# Patient Record
Sex: FEMALE | Race: WHITE | Employment: OTHER | URBAN - METROPOLITAN AREA
[De-identification: names, ages, dates, MRNs, and addresses within clinical notes are randomized per-mention and may not be internally consistent; named-entity substitution may affect disease eponyms.]

---

## 2017-01-09 ENCOUNTER — HOSPITAL ENCOUNTER (OUTPATIENT)
Dept: MAMMOGRAPHY | Age: 72
Discharge: HOME | End: 2017-01-09
Attending: OBSTETRICS & GYNECOLOGY
Payer: MEDICARE

## 2017-01-09 DIAGNOSIS — M94.9 DISORDER OF BONE AND CARTILAGE, UNSPECIFIED: ICD-10-CM

## 2017-01-09 DIAGNOSIS — M89.9 DISORDER OF BONE AND CARTILAGE, UNSPECIFIED: ICD-10-CM

## 2017-01-09 PROCEDURE — 77080 DXA BONE DENSITY AXIAL: CPT

## 2017-01-24 PROBLEM — M81.0 OSTEOPOROSIS: Status: ACTIVE | Noted: 2017-01-24

## 2017-10-04 ENCOUNTER — HOSPITAL ENCOUNTER (OUTPATIENT)
Dept: LAB | Age: 72
Discharge: HOME | End: 2017-10-04

## 2017-10-04 PROCEDURE — 88305 TISSUE EXAM BY PATHOLOGIST: CPT | Performed by: INTERNAL MEDICINE

## 2017-10-04 PROCEDURE — 88312 SPECIAL STAINS GROUP 1: CPT | Performed by: INTERNAL MEDICINE

## 2018-02-06 ENCOUNTER — HOSPITAL ENCOUNTER (OUTPATIENT)
Dept: GENERAL RADIOLOGY | Age: 73
Discharge: HOME | End: 2018-02-06
Attending: FAMILY MEDICINE
Payer: MEDICARE

## 2018-02-06 DIAGNOSIS — S69.92XA INJURY OF LEFT HAND, INITIAL ENCOUNTER: ICD-10-CM

## 2018-02-06 PROCEDURE — 73130 X-RAY EXAM OF HAND: CPT

## 2018-03-12 ENCOUNTER — HOSPITAL ENCOUNTER (OUTPATIENT)
Dept: MAMMOGRAPHY | Age: 73
Discharge: HOME | End: 2018-03-12
Attending: OBSTETRICS & GYNECOLOGY
Payer: MEDICARE

## 2018-03-12 DIAGNOSIS — Z12.39 SCREENING FOR BREAST CANCER: ICD-10-CM

## 2018-03-12 PROCEDURE — 77063 BREAST TOMOSYNTHESIS BI: CPT

## 2018-03-22 PROBLEM — R92.2 DENSE BREAST TISSUE ON MAMMOGRAM: Status: ACTIVE | Noted: 2018-03-22

## 2018-05-24 PROBLEM — R41.3 MEMORY DIFFICULTIES: Status: ACTIVE | Noted: 2018-05-24

## 2018-06-11 ENCOUNTER — HOSPITAL ENCOUNTER (OUTPATIENT)
Dept: MRI IMAGING | Age: 73
Discharge: HOME | End: 2018-06-11
Attending: PSYCHIATRY & NEUROLOGY
Payer: MEDICARE

## 2018-06-11 DIAGNOSIS — R41.3 MEMORY DIFFICULTIES: ICD-10-CM

## 2018-06-11 PROCEDURE — 70551 MRI BRAIN STEM W/O DYE: CPT

## 2018-06-11 NOTE — PROGRESS NOTES
Gopi Mendez,     I have reviewed your MRI brain report and pictures, there were a few tiny spots, nonspecific and can be seen in normal aging, migraine, headache injury, high blood pressure, etc. No stroke, no tumor.      Dr Alicja Greenwood

## 2018-06-15 ENCOUNTER — APPOINTMENT (RX ONLY)
Dept: URBAN - METROPOLITAN AREA CLINIC 349 | Facility: CLINIC | Age: 73
Setting detail: DERMATOLOGY
End: 2018-06-15

## 2018-06-15 DIAGNOSIS — L82.0 INFLAMED SEBORRHEIC KERATOSIS: ICD-10-CM

## 2018-06-15 DIAGNOSIS — I78.8 OTHER DISEASES OF CAPILLARIES: ICD-10-CM

## 2018-06-15 DIAGNOSIS — L91.8 OTHER HYPERTROPHIC DISORDERS OF THE SKIN: ICD-10-CM

## 2018-06-15 DIAGNOSIS — L85.3 XEROSIS CUTIS: ICD-10-CM

## 2018-06-15 PROBLEM — J45.909 UNSPECIFIED ASTHMA, UNCOMPLICATED: Status: ACTIVE | Noted: 2018-06-15

## 2018-06-15 PROBLEM — E03.9 HYPOTHYROIDISM, UNSPECIFIED: Status: ACTIVE | Noted: 2018-06-15

## 2018-06-15 PROCEDURE — 99202 OFFICE O/P NEW SF 15 MIN: CPT | Mod: 25

## 2018-06-15 PROCEDURE — 17110 DESTRUCTION B9 LES UP TO 14: CPT

## 2018-06-15 PROCEDURE — ? COUNSELING

## 2018-06-15 PROCEDURE — ? SKIN TAG REMOVAL

## 2018-06-15 PROCEDURE — ? BENIGN DESTRUCTION

## 2018-06-15 PROCEDURE — ? RECOMMENDATIONS

## 2018-06-15 PROCEDURE — 11200 RMVL SKIN TAGS UP TO&INC 15: CPT | Mod: 59

## 2018-06-15 ASSESSMENT — LOCATION DETAILED DESCRIPTION DERM
LOCATION DETAILED: LEFT VENTRAL PROXIMAL FOREARM
LOCATION DETAILED: RIGHT INFERIOR LATERAL NECK
LOCATION DETAILED: RIGHT SUPERIOR MEDIAL UPPER BACK
LOCATION DETAILED: SUBXIPHOID
LOCATION DETAILED: LEFT ANTERIOR PROXIMAL THIGH
LOCATION DETAILED: LEFT MEDIAL MALAR CHEEK
LOCATION DETAILED: LEFT SUPERIOR LATERAL NECK
LOCATION DETAILED: RIGHT ANTERIOR DISTAL THIGH
LOCATION DETAILED: RIGHT SUPERIOR LATERAL NECK
LOCATION DETAILED: LEFT INFERIOR UPPER BACK
LOCATION DETAILED: RIGHT VENTRAL PROXIMAL FOREARM
LOCATION DETAILED: LEFT INFERIOR ANTERIOR NECK
LOCATION DETAILED: LEFT SUPERIOR ANTERIOR NECK
LOCATION DETAILED: LEFT SUPERIOR LATERAL MIDBACK
LOCATION DETAILED: LEFT INFERIOR LATERAL NECK
LOCATION DETAILED: LEFT RIB CAGE

## 2018-06-15 ASSESSMENT — LOCATION SIMPLE DESCRIPTION DERM
LOCATION SIMPLE: LEFT THIGH
LOCATION SIMPLE: RIGHT ANTERIOR NECK
LOCATION SIMPLE: LEFT UPPER BACK
LOCATION SIMPLE: LEFT ANTERIOR NECK
LOCATION SIMPLE: ABDOMEN
LOCATION SIMPLE: RIGHT FOREARM
LOCATION SIMPLE: LEFT FOREARM
LOCATION SIMPLE: RIGHT THIGH
LOCATION SIMPLE: LEFT CHEEK
LOCATION SIMPLE: LEFT LOWER BACK
LOCATION SIMPLE: RIGHT UPPER BACK

## 2018-06-15 ASSESSMENT — LOCATION ZONE DERM
LOCATION ZONE: FACE
LOCATION ZONE: ARM
LOCATION ZONE: TRUNK
LOCATION ZONE: LEG
LOCATION ZONE: NECK

## 2018-06-15 NOTE — PROCEDURE: SKIN TAG REMOVAL
Medical Necessity Information: It is in your best interest to select a reason for this procedure from the list below. All of these items fulfill various CMS LCD requirements except the new and changing color options.
Medical Necessity Clause: This procedure was medically necessary because the lesions that were treated were:
Include Z78.9 (Other Specified Conditions Influencing Health Status) As An Associated Diagnosis?: No
Anesthesia Volume In Cc: 0
Consent: Written consent obtained and the risks of skin tag removal was reviewed with the patient including but not limited to bleeding, pigmentary change, infection, pain, and remote possibility of scarring.
Detail Level: Detailed
Add Associated Diagnoses If Applicable When Selecting Medical Necessity: Yes

## 2018-06-15 NOTE — PROCEDURE: BENIGN DESTRUCTION
Medical Necessity Information: It is in your best interest to select a reason for this procedure from the list below. All of these items fulfill various CMS LCD requirements except the new and changing color options.
Add 52 Modifier (Optional): no
Treatment Number (Will Not Render If 0): 0
Medical Necessity Clause: This procedure was medically necessary because the lesions that were treated were:
Consent: The patient's consent was obtained including but not limited to risks of crusting, scabbing, blistering, scarring, darker or lighter pigmentary change, recurrence, incomplete removal and infection.
Detail Level: Detailed
Post-Care Instructions: I reviewed with the patient in detail post-care instructions. Patient is to wear sunprotection, and avoid picking at any of the treated lesions. Pt may apply Vaseline to crusted or scabbing areas.

## 2018-06-15 NOTE — PROCEDURE: RECOMMENDATIONS
Detail Level: Zone
Recommendations (Free Text): Dove for sensitive skin bar soap \\nCerave cream apply twice daily for dryness

## 2018-07-02 ENCOUNTER — APPOINTMENT (RX ONLY)
Dept: URBAN - METROPOLITAN AREA CLINIC 349 | Facility: CLINIC | Age: 73
Setting detail: DERMATOLOGY
End: 2018-07-02

## 2018-07-02 DIAGNOSIS — L85.3 XEROSIS CUTIS: ICD-10-CM

## 2018-07-02 DIAGNOSIS — L82.0 INFLAMED SEBORRHEIC KERATOSIS: ICD-10-CM

## 2018-07-02 DIAGNOSIS — D18.0 HEMANGIOMA: ICD-10-CM

## 2018-07-02 DIAGNOSIS — Z71.89 OTHER SPECIFIED COUNSELING: ICD-10-CM

## 2018-07-02 DIAGNOSIS — L82.1 OTHER SEBORRHEIC KERATOSIS: ICD-10-CM

## 2018-07-02 PROBLEM — D18.01 HEMANGIOMA OF SKIN AND SUBCUTANEOUS TISSUE: Status: ACTIVE | Noted: 2018-07-02

## 2018-07-02 PROCEDURE — ? COUNSELING

## 2018-07-02 PROCEDURE — 17110 DESTRUCTION B9 LES UP TO 14: CPT

## 2018-07-02 PROCEDURE — ? TREATMENT REGIMEN

## 2018-07-02 PROCEDURE — ? LIQUID NITROGEN

## 2018-07-02 PROCEDURE — 99213 OFFICE O/P EST LOW 20 MIN: CPT | Mod: 25

## 2018-07-02 ASSESSMENT — LOCATION SIMPLE DESCRIPTION DERM
LOCATION SIMPLE: LEFT UPPER BACK
LOCATION SIMPLE: LEFT PRETIBIAL REGION
LOCATION SIMPLE: RIGHT PRETIBIAL REGION
LOCATION SIMPLE: LEFT LOWER BACK
LOCATION SIMPLE: LEFT FOREARM
LOCATION SIMPLE: ABDOMEN
LOCATION SIMPLE: LEFT POSTERIOR THIGH
LOCATION SIMPLE: RIGHT UPPER BACK
LOCATION SIMPLE: RIGHT FOREARM

## 2018-07-02 ASSESSMENT — LOCATION DETAILED DESCRIPTION DERM
LOCATION DETAILED: LEFT DISTAL POSTERIOR THIGH
LOCATION DETAILED: LEFT PROXIMAL PRETIBIAL REGION
LOCATION DETAILED: LEFT LATERAL ABDOMEN
LOCATION DETAILED: LEFT PROXIMAL DORSAL FOREARM
LOCATION DETAILED: RIGHT MID-UPPER BACK
LOCATION DETAILED: RIGHT PROXIMAL PRETIBIAL REGION
LOCATION DETAILED: LEFT SUPERIOR LATERAL LOWER BACK
LOCATION DETAILED: EPIGASTRIC SKIN
LOCATION DETAILED: LEFT RIB CAGE
LOCATION DETAILED: LEFT MEDIAL UPPER BACK
LOCATION DETAILED: RIGHT PROXIMAL DORSAL FOREARM

## 2018-07-02 ASSESSMENT — LOCATION ZONE DERM
LOCATION ZONE: ARM
LOCATION ZONE: TRUNK
LOCATION ZONE: LEG

## 2018-07-02 NOTE — PROCEDURE: TREATMENT REGIMEN
Detail Level: Zone
Samples Given: Aveeno skin relief moisturizers, cerave skin relief moisturizers with coupons
Initiate Treatment: Advised Aveeno moisturizers to use several times daily

## 2018-07-02 NOTE — PROCEDURE: LIQUID NITROGEN
Medical Necessity Information: It is in your best interest to select a reason for this procedure from the list below. All of these items fulfill various CMS LCD requirements except the new and changing color options.
Post-Care Instructions: I reviewed with the patient in detail post-care instructions. Patient is to wear sunprotection, and avoid picking at any of the treated lesions. Pt may apply Vaseline to crusted or scabbing areas.
Medical Necessity Clause: This procedure was medically necessary because the lesions that were treated were:
Render Post-Care Instructions In Note?: no
Consent: The patient's consent was obtained including but not limited to risks of crusting, scabbing, blistering, scarring, darker or lighter pigmentary change, recurrence, incomplete removal and infection.
Detail Level: Detailed

## 2018-09-21 ENCOUNTER — HOSPITAL ENCOUNTER (OUTPATIENT)
Dept: ULTRASOUND IMAGING | Age: 73
Discharge: HOME | End: 2018-09-21
Payer: MEDICARE

## 2018-09-21 DIAGNOSIS — R53.83 FATIGUE, UNSPECIFIED TYPE: ICD-10-CM

## 2018-09-21 DIAGNOSIS — E04.2 MULTIPLE THYROID NODULES: ICD-10-CM

## 2018-09-21 PROCEDURE — 76536 US EXAM OF HEAD AND NECK: CPT

## 2018-10-16 ENCOUNTER — HOSPITAL ENCOUNTER (OUTPATIENT)
Dept: GENERAL RADIOLOGY | Age: 73
Discharge: HOME | End: 2018-10-16
Attending: FAMILY MEDICINE
Payer: MEDICARE

## 2018-10-16 DIAGNOSIS — R05.9 COUGH: ICD-10-CM

## 2018-10-16 PROCEDURE — 71046 X-RAY EXAM CHEST 2 VIEWS: CPT

## 2019-03-06 ENCOUNTER — HOSPITAL ENCOUNTER (OUTPATIENT)
Dept: PHYSICAL THERAPY | Age: 74
Discharge: HOME | End: 2019-03-06
Attending: FAMILY MEDICINE
Payer: MEDICARE

## 2019-03-06 DIAGNOSIS — R42 DIZZINESS: ICD-10-CM

## 2019-03-06 PROCEDURE — 97112 NEUROMUSCULAR REEDUCATION: CPT

## 2019-03-06 PROCEDURE — 97162 PT EVAL MOD COMPLEX 30 MIN: CPT

## 2019-03-06 NOTE — THERAPY EVALUATION
Shasha Slider  : 1945  [place]  Secondary: J[facility] at [place] [id number] [facility], [address] [place] [address],[address], [facility] [address]  [phone]   [phone]           OUTPATIENT PHYSICAL THERAPY:Initial Assessment 3/6/2019   ICD-10: Treatment Diagnosis: Other abnormalities of gait and mobility R26.89; Dizziness and giddiness R42  Precautions/Allergies:   Codeine; Morphine; Amoxicillin-pot clavulanate; Ciprofloxacin; Demerol [meperidine]; Gatifloxacin; Gluten protein; Sulfamethoxazole-trimethoprim; Telithromycin; Bactrim [sulfamethoprim ds]; Cephalosporins; Gluten; Omnicef [cefdinir]; Phenergan [promethazine]; and Synthroid [levothyroxine]   MD Orders: eval and treat MEDICAL/REFERRING DIAGNOSIS:  Dizziness [R42]   DATE OF ONSET: 7 weeks ago. REFERRING PHYSICIAN: Soniya Sanderson MD  RETURN PHYSICIAN APPOINTMENT:      INITIAL ASSESSMENT:  Ms. Khadijah Mario presents with dizziness and imbalance, improving over the last several weeks. Patient demonstrates imbalance with testing and reports that she is not functionally back to her normal daily activities at this point. Patient would benefit from PT to address these problems to improve patient's independence and safety with mobility and daily activities. Thank you. PROBLEM LIST (Impacting functional limitations):  1. Decreased Transfer Abilities  2. Decreased Balance  3. Decreased Activity Tolerance  4. Decreased [place] with Home Exercise Program INTERVENTIONS PLANNED: (Treatment may consist of any combination of the following)  1. Balance Exercise  2. Home Exercise Program (HEP)  3. Neuromuscular Re-education/Strengthening  4. Therapeutic Activites  5. Therapeutic Exercise/Strengthening   6. Vestibular and habituation training   TREATMENT PLAN:  Effective Dates: 3/6/2019 TO 2019 (90 days).   Frequency/Duration: 1-2 times a week for 60- 90 Day(s)  GOALS: (Goals have been discussed and agreed upon with patient.)  Short-Term Functional Goals: Time Frame: 2-4 weeks  1. Patient will demonstrate independence and compliance with home exercise program to improve balance and dizziness for daily activities. 2. Patient will increase her score on the dynamic gait index to greater than or equal to 19/24 indicating improved balance and safety for community ambulation. 3.   Discharge Goals: Time Frame: 8-12 weeks  1. Patient will report decreased dizziness to less than or equal to 1/10 with daily activities to improve safety and quality of life. 2. Patient will increase her score on the dynamic gait index to greater than or equal to 23/24 indicating improved balance and safety for community ambulation. 3. Patient will increase her score on the Alejandra Balance Scale to greater than or equal to 52/56 indicating improved safety and decreased fall risk for daily activities. 4. Patient will ambulate with least assistive device over level and unlevel surfaces without evidence of imbalance to improve safety for daily activities. 5. Patient will demonstrate improved score on the Dizziness Handicap Inventory to less than or equal to 15 indicating decreased perceived dizziness and imbalance with daily activities. Rehabilitation Potential For Stated Goals: Good  Regarding Britt Silvano Wahl's therapy, I certify that the treatment plan above will be carried out by a therapist or under their direction. Thank you for this referral,  Asha Armas PT     Referring Physician Signature: Royer Thomson MD              Date                    The information in this section was collected on 3/6/19 (except where otherwise noted). HISTORY:   History of Present Injury/Illness (Reason for Referral):  7 weeks ago, went to hospital with vertigo. Checked heart, okay but very dehydrated. Just finished Home health PT for 5 weeks for dizziness and balance. Moving around stirs up dizziness.  Being still is fine. No falls. Ears feel plugged but no recent change in hearing. No changes in vision. No tinnitus. Tend to drift to the R when walking or moving too quick. Gets swimmy headed if move head too quick but goes away. Sleeping well. Not moving at pace like usual. Usually watches two grandchildren, driving, cleaning house. Wants to get back to that. Past Medical History/Comorbidities:   Ms. 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 Ms. Anabel Blackwood  has a past surgical history that includes hx cholecystectomy (1977); hx orthopaedic (Right); pr lap,tubal cautery; hx open cholecystectomy (1977); hx tubal ligation (Bilateral, 1981); hx gyn (2004); hx cataract removal (Bilateral, 2014); hx tonsillectomy (1950); hx dilation and curettage (1994; 2003); hx wisdom teeth extraction (1964); hx open reduction internal fixation (Right, 1995); hx cataract removal (06/30/2014); and hx colonoscopy (2014). Social History/Living Environment:     lives with daughter, son in law and 2 small children; retired teacher  Prior Level of Function/Work/Activity:  Independent, driving, taking care of grandchildren  Dominant Side:         RIGHT  Previous Treatment Approaches:          HHPT  Personal Factors:          Sex:  female        Age:  76 y.o.      Ambulatory/Rehab Services H2 Model Falls Risk Assessment    Risk Factors:       (1)  Dizziness/Vertigo Ability to Rise from Chair:       (0)  Ability to rise in a single movement    Falls Prevention Plan:       No modifications necessary   Total: (5 or greater = High Risk): 1    ©2010 [facility]. All Rights Reserved. [facility] States Patent #[id number]. Federal Law prohibits the replication, distribution or use without written permission from [facility]     Current Medications:       Current Outpatient Medications:     levothyroxine (SYNTHROID) 88 mcg tablet, Take 1 Tab by mouth Daily (before breakfast). , Disp: 1 Tab, Rfl: 0    BABY ASPIRIN PO, Take  by mouth daily. , Disp: , Rfl:     predniSONE (STERAPRED DS) 10 mg dose pack, TAD over 6 days, Disp: 1 Package, Rfl: 0    atorvastatin (LIPITOR) 10 mg tablet, TAKE 1 TABLET BY MOUTH DAILY, Disp: 90 Tab, Rfl: 0    nitroglycerin (NITROSTAT) 0.4 mg SL tablet, PLACE 1 TABLET UNDER THE TONGUE EVERY FIVE MINUTES AS NEEDED FOR CHEST PAIN, Disp: , Rfl:     TURMERIC ROOT EXTRACT PO, Take  by mouth., Disp: , Rfl:     cholecalciferol (VITAMIN D3) 1,000 unit cap, Take  by mouth daily. , Disp: , Rfl:     ketotifen (ZADITOR) 0.025 % (0.035 %) ophthalmic solution, Administer 1 Drop to both eyes two (2) times a day., Disp: , Rfl:     fluticasone-vilanterol (BREO ELLIPTA) 100-25 mcg/dose inhaler, Take 1 Puff by inhalation daily. , Disp: , Rfl:     levalbuterol (XOPENEX) 1.25 mg/0.5 mL nebu, 1.25 mg., Disp: , Rfl:     lisinopril (PRINIVIL, ZESTRIL) 20 mg tablet, Take 1 Tab by mouth two (2) times a day. Indications: hypertension (Patient taking differently: Take 20 mg by mouth daily. Indications: hypertension), Disp: 180 Tab, Rfl: 3    famotidine (PEPCID) 20 mg tablet, Take  by mouth two (2) times a day. 20 mg qam, 10 mg qpm. Per GI, Disp: , Rfl: 1    dicyclomine (BENTYL) 10 mg capsule, Take 1 Cap by mouth three (3) times daily as needed. Per GI, Disp: , Rfl: 5    B INFANTIS/B ANI/B IVONNE/B BIFID (PROBIOTIC 4X PO), Take  by mouth daily. , Disp: , Rfl:     Cetirizine (ZYRTEC) 10 mg cap, Take  by mouth., Disp: , Rfl:    azelastine (ASTELIN) 137 mcg nasal spray, 1 spray two (2) times a day. Use in each nostril as directed, Disp: , Rfl:     montelukast (SINGULAIR) 10 mg tablet, Take 10 mg by mouth daily. , Disp: , Rfl:     albuterol (PROAIR HFA) 90 mcg/actuation inhaler, Take  by inhalation. , Disp: , Rfl:    Date Last Reviewed:  3/6/19   Number of Personal Factors/Comorbidities that affect the Plan of Care: 1-2: MODERATE COMPLEXITY   EXAMINATION:   Observation/Orthostatic Postural Assessment:          Mild forward head posture  Strength:          4+/5 B LE  Functional Mobility:         Gait/Ambulation:  Patient ambulates with no AD with a normal gait pattern but demonstrates path deviations, particularly to the R. Transfers:  Independent, no UE assist for sit to stand        Bed Mobility:  independent        Stairs:  With rail  Sensation:         intact  Postural Control & Balance:  · Alejandra Balance Scale:  47/56.   (A score less than 45/56 indicates high risk of falls)     · Dynamic Gait Index:  17/24. (A score less than or equal to19/24 is abnormal and predictive of falls)     · The [employer] Test: NT      Oculomotor Exam:  · Eye Range of Motion:  full range of motion  · Cervical Range of Motion:   diminished range of motion  · Spontaneous nystagmus:  NO  · Gaze holding nystagmus:  NO  · Skew Deviation:  none  · Smooth Pursuit:      [x]Smooth Eye Movements    []Delayed Eye Movements     []Within Normal Limits     [x]Other(comment): no dizziness  · Voluntary Saccades:      [x]Smooth Eye Movements    []Delayed Eye Movements     []Within Normal Limits     [x]Other(comment): no dizziness  · Optokinetic cloth: NT  Vestibular Ocular Reflex Testing:  · Dynamic Visual Acuity Test:  NT  · Head Thrust Test: Negative to the right. Negative to the left. · VOR Cancellation: WNL  Position Tests:  · [place] testing: N/A       Body Structures Involved:  1. Nerves  2. Eyes and Ears  3.  Muscles Body Functions Affected:  1. Sensory/Pain  2. Neuromusculoskeletal  3. Movement Related Activities and Participation Affected:  1. General Tasks and Demands  2. Mobility  3. Domestic Life  4. Community, Social and [place]   Number of elements (examined above) that affect the Plan of Care: 3: MODERATE COMPLEXITY   CLINICAL PRESENTATION:   Presentation: Evolving clinical presentation with changing clinical characteristics: MODERATE COMPLEXITY   CLINICAL DECISION MAKING:   Outcome Measure: Tool Used: Alejandra Balance Scale  Score:  Initial: 47/56 Most Recent: X/56 (Date: -- )   Interpretation of Score: Each section is scored on a 0-4 scale, 0 representing the patients inability to perform the task and 4 representing independence. The scores of each section are added together for a total score of 56. The higher the patients score, the more independent the patient is. Any score below 45 indicates increased risk for falls. Tool Used: Dynamic Gait Index  Score:  Initial: 17/24 Most Recent: X/24 (Date: -- )   Interpretation of Score: Each section is scored on a 0-3 scale, 0 representing the patients inability to perform the task and 3 representing independence. The scores of each section are added together for a total score of 24. Any score below 19 indicates increased risk for falls. Tool Used: Dizziness Handicap Index  Score:  Initial: 46  Most Recent: X (Date: -- )   Interpretation of Score: To each item, the following scores may be assigned: No = 0, Sometimes = 2, Yes = 4. Scores greater than 10 points should be referred to balance specialists for further evaluation. Medical Necessity:   · Patient is expected to demonstrate progress in strength, balance and functional technique to improve safety during daily activities. Reason for Services/Other Comments:  · Patient continues to demonstrate capacity to improve balance, dizziness which will increase independence and increase safety.    Use of outcome tool(s) and clinical judgement create a POC that gives a: Questionable prediction of patient's progress: MODERATE COMPLEXITY            TREATMENT:   (In addition to Assessment/Re-Assessment sessions the following treatments were rendered)  Pre-treatment Symptoms/Complaints:  Dizziness with movement. Pain: Initial:   Pain Intensity 1: 0 0/10 pain;   3/10 dizziness today Post Session:  3/10 dizziness     NEUROMUSCULAR RE-EDUCATION: (20 minutes):  Exercise/activities per grid below to improve balance, kinesthetic sense, posture and proprioception. Required minimal verbal cues to promote static and dynamic balance in standing. Date:  3/6/19 Date:   Date:     Activity/Exercise Parameters Parameters Parameters   Walking with head turns 4 laps in hallway     Walking with head up/down 4 laps in hallway     marching 4 laps in hallway     sidestepping 4 laps in hallway     Standing 360 degree turns 1 rep x 3 each direction, more dizzy to R     Standing x1 viewing 30 sec x 2 each direction               [facility]  Treatment/Session Assessment:    · Response to Treatment:  Patient tolerated session well. Patient was given HEP to start working at home. Patient should progress well with balance and vestibular training. .  · Compliance with Program/Exercises: Will assess as treatment progresses. · Recommendations/Intent for next treatment session: \"Next visit will focus on advancements to more challenging activities\".   Total Treatment Duration:  PT Patient Time In/Time Out  Time In: 0930  Time Out: [address], PT    Future Appointments   Date Time Provider Enzo Pulido   3/8/2019  8:45 AM Allison Fountain, PT [facility]   3/13/2019  9:45 AM Allisonjoaquin Fountain PT [facility]   3/15/2019  8:45 AM Allison Sides, PT [facility]   3/18/2019 11:15 AM Tomas Arthur MD [facility]   3/19/2019  9:00 AM [facility]   3/21/2019  9:45 AM Allison Fountain, PT [facility]   3/26/2019  9:45 AM Dylan Staton, PT [facility]   3/28/2019  9:45 AM Dylan Staton, PT [facility]   4/2/2019 11:15 AM Yakelin Lilibeth Eller MD [facility]   4/20/2019  9:00 AM [facility] ROOM 3 [facility]   8/8/2019  1:00 PM Brian Waters MD [facility]

## 2019-03-13 ENCOUNTER — HOSPITAL ENCOUNTER (OUTPATIENT)
Dept: PHYSICAL THERAPY | Age: 74
Discharge: HOME | End: 2019-03-13
Attending: FAMILY MEDICINE
Payer: MEDICARE

## 2019-03-13 PROCEDURE — 97112 NEUROMUSCULAR REEDUCATION: CPT

## 2019-03-13 NOTE — PROGRESS NOTES
Nishant Gómez  : 1945  [place]  Secondary: Jt Huerta 75 at [facility], [address] [address],[address], [address].  [phone]   [phone]           OUTPATIENT PHYSICAL THERAPY:Daily Note 3/13/2019   ICD-10: Treatment Diagnosis: Other abnormalities of gait and mobility R26.89; Dizziness and giddiness R42  Precautions/Allergies:   Codeine; Morphine; Amoxicillin-pot clavulanate; Ciprofloxacin; Demerol [meperidine]; Gatifloxacin; Gluten protein; Sulfamethoxazole-trimethoprim; Telithromycin; Bactrim [sulfamethoprim ds]; Cephalosporins; Gluten; Omnicef [cefdinir]; Phenergan [promethazine]; and Synthroid [levothyroxine]   MD Orders: eval and treat MEDICAL/REFERRING DIAGNOSIS:  Dizziness [R42]   DATE OF ONSET: 7 weeks ago. REFERRING PHYSICIAN: Desiree Cage MD  RETURN PHYSICIAN APPOINTMENT:      INITIAL ASSESSMENT:  Ms. Cheryl Gonzalez presents with dizziness and imbalance, improving over the last several weeks. Patient demonstrates imbalance with testing and reports that she is not functionally back to her normal daily activities at this point. Patient would benefit from PT to address these problems to improve patient's independence and safety with mobility and daily activities. Thank you. PROBLEM LIST (Impacting functional limitations):  1. Decreased Transfer Abilities  2. Decreased Balance  3. Decreased Activity Tolerance  4. Decreased Liberty with Home Exercise Program INTERVENTIONS PLANNED: (Treatment may consist of any combination of the following)  1. Balance Exercise  2. Home Exercise Program (HEP)  3. Neuromuscular Re-education/Strengthening  4. Therapeutic Activites  5. Therapeutic Exercise/Strengthening   6. Vestibular and habituation training   TREATMENT PLAN:  Effective Dates: 3/6/2019 TO 2019 (90 days).   Frequency/Duration: 1-2 times a week for 60- 90 Day(s)  GOALS: (Goals have been discussed and agreed upon with patient.)  Short-Term Functional Goals: Time Frame: 2-4 weeks  1. Patient will demonstrate independence and compliance with home exercise program to improve balance and dizziness for daily activities. 2. Patient will increase her score on the dynamic gait index to greater than or equal to 19/24 indicating improved balance and safety for community ambulation. 3.   Discharge Goals: Time Frame: 8-12 weeks  1. Patient will report decreased dizziness to less than or equal to 1/10 with daily activities to improve safety and quality of life. 2. Patient will increase her score on the dynamic gait index to greater than or equal to 23/24 indicating improved balance and safety for community ambulation. 3. Patient will increase her score on the Alejandra Balance Scale to greater than or equal to 52/56 indicating improved safety and decreased fall risk for daily activities. 4. Patient will ambulate with least assistive device over level and unlevel surfaces without evidence of imbalance to improve safety for daily activities. 5. Patient will demonstrate improved score on the Dizziness Handicap Inventory to less than or equal to 15 indicating decreased perceived dizziness and imbalance with daily activities. Rehabilitation Potential For Stated Goals: Good              The information in this section was collected on 3/6/19 (except where otherwise noted). HISTORY:   History of Present Injury/Illness (Reason for Referral):  7 weeks ago, went to hospital with vertigo. Checked heart, okay but very dehydrated. Just finished Home health PT for 5 weeks for dizziness and balance. Moving around stirs up dizziness. Being still is fine. No falls. Ears feel plugged but no recent change in hearing. No changes in vision. No tinnitus. Tend to drift to the R when walking or moving too quick. Gets swimmy headed if move head too quick but goes away. Sleeping well.  Not moving at pace like usual. Usually watches two grandchildren, driving, cleaning house. Wants to get back to that. Past Medical History/Comorbidities:   Ms. 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 Ms. Anabel Blackwood  has a past surgical history that includes hx cholecystectomy (1977); hx orthopaedic (Right); pr lap,tubal cautery; hx open cholecystectomy (1977); hx tubal ligation (Bilateral, 1981); hx gyn (2004); hx cataract removal (Bilateral, 2014); hx tonsillectomy (1950); hx dilation and curettage (1994; 2003); hx wisdom teeth extraction (1964); hx open reduction internal fixation (Right, 1995); hx cataract removal (06/30/2014); and hx colonoscopy (2014). Social History/Living Environment:     lives with daughter, son in law and 2 small children; retired teacher  Prior Level of Function/Work/Activity:  Independent, driving, taking care of grandchildren  Dominant Side:         RIGHT  Previous Treatment Approaches:          HHPT  Personal Factors:          Sex:  female        Age:  76 y.o. Ambulatory/Rehab Services H2 Model Falls Risk Assessment    Risk Factors:       (1)  Dizziness/Vertigo Ability to Rise from Chair:       (0)  Ability to rise in a single movement    Falls Prevention Plan:       No modifications necessary   Total: (5 or greater = High Risk): 1    ©2010 [facility]. All Rights Reserved. [facility] Patent #[id number].  Federal Law prohibits the replication, distribution or use without written permission from [facility]     Current Medications:       Current Outpatient Medications:     levothyroxine (SYNTHROID) 88 mcg tablet, Take 1 Tab by mouth Daily (before breakfast). , Disp: 1 Tab, Rfl: 0    BABY ASPIRIN PO, Take  by mouth daily. , Disp: , Rfl:     predniSONE (STERAPRED DS) 10 mg dose pack, TAD over 6 days, Disp: 1 Package, Rfl: 0    atorvastatin (LIPITOR) 10 mg tablet, TAKE 1 TABLET BY MOUTH DAILY, Disp: 90 Tab, Rfl: 0    nitroglycerin (NITROSTAT) 0.4 mg SL tablet, PLACE 1 TABLET UNDER THE TONGUE EVERY FIVE MINUTES AS NEEDED FOR CHEST PAIN, Disp: , Rfl:     TURMERIC ROOT EXTRACT PO, Take  by mouth., Disp: , Rfl:     cholecalciferol (VITAMIN D3) 1,000 unit cap, Take  by mouth daily. , Disp: , Rfl:     ketotifen (ZADITOR) 0.025 % (0.035 %) ophthalmic solution, Administer 1 Drop to both eyes two (2) times a day., Disp: , Rfl:     fluticasone-vilanterol (BREO ELLIPTA) 100-25 mcg/dose inhaler, Take 1 Puff by inhalation daily. , Disp: , Rfl:     levalbuterol (XOPENEX) 1.25 mg/0.5 mL nebu, 1.25 mg., Disp: , Rfl:     lisinopril (PRINIVIL, ZESTRIL) 20 mg tablet, Take 1 Tab by mouth two (2) times a day. Indications: hypertension (Patient taking differently: Take 20 mg by mouth daily. Indications: hypertension), Disp: 180 Tab, Rfl: 3    famotidine (PEPCID) 20 mg tablet, Take  by mouth two (2) times a day. 20 mg qam, 10 mg qpm. Per GI, Disp: , Rfl: 1    dicyclomine (BENTYL) 10 mg capsule, Take 1 Cap by mouth three (3) times daily as needed. Per GI, Disp: , Rfl: 5    B INFANTIS/B ANI/B IVONNE/B BIFID (PROBIOTIC 4X PO), Take  by mouth daily. , Disp: , Rfl:     Cetirizine (ZYRTEC) 10 mg cap, Take  by mouth., Disp: , Rfl:     azelastine (ASTELIN) 137 mcg nasal spray, 1 spray two (2) times a day. Use in each nostril as directed, Disp: , Rfl:     montelukast (SINGULAIR) 10 mg tablet, Take 10 mg by mouth daily. , Disp: , Rfl:     albuterol (PROAIR HFA) 90 mcg/actuation inhaler, Take  by inhalation. , Disp: , Rfl:    Date Last Reviewed:  3/13/19   Number of Personal Factors/Comorbidities that affect the Plan of Care: 1-2: MODERATE COMPLEXITY   EXAMINATION:   Observation/Orthostatic Postural Assessment:          Mild forward head posture  Strength:          4+/5 B LE  Functional Mobility:         Gait/Ambulation:  Patient ambulates with no AD with a normal gait pattern but demonstrates path deviations, particularly to the R. Transfers:  Independent, no UE assist for sit to stand        Bed Mobility:  independent        Stairs:  With rail  Sensation:         intact  Postural Control & Balance:  · Alejandra Balance Scale:  47/56.   (A score less than 45/56 indicates high risk of falls)     · Dynamic Gait Index:  17/24. (A score less than or equal to19/24 is abnormal and predictive of falls)     · [employer] Sensory Organization Test: NT      Oculomotor Exam:  · Eye Range of Motion:  full range of motion  · Cervical Range of Motion:   diminished range of motion  · Spontaneous nystagmus:  NO  · Gaze holding nystagmus:  NO  · Skew Deviation:  none  · Smooth Pursuit:      [x]Smooth Eye Movements    []Delayed Eye Movements     []Within Normal Limits     [x]Other(comment): no dizziness  · Voluntary Saccades:      [x]Smooth Eye Movements    []Delayed Eye Movements     []Within Normal Limits     [x]Other(comment): no dizziness  · Optokinetic cloth: NT  Vestibular Ocular Reflex Testing:  · Dynamic Visual Acuity Test:  NT  · Head Thrust Test: Negative to the right. Negative to the left. · VOR Cancellation: WNL  Position Tests:  · Hallpike-Sonia testing: N/A       Body Structures Involved:  1. Nerves  2. Eyes and Ears  3. Muscles Body Functions Affected:  1. Sensory/Pain  2. Neuromusculoskeletal  3. Movement Related Activities and Participation Affected:  1. General Tasks and Demands  2. Mobility  3. Domestic Life  4.  Community, Social and [place]   Number of elements (examined above) that affect the Plan of Care: 3: MODERATE COMPLEXITY   CLINICAL PRESENTATION:   Presentation: Evolving clinical presentation with changing clinical characteristics: MODERATE COMPLEXITY   CLINICAL DECISION MAKING:   Outcome Measure: Tool Used: Alejandra Balance Scale  Score:  Initial: 47/56 Most Recent: X/56 (Date: -- )   Interpretation of Score: Each section is scored on a 0-4 scale, 0 representing the patients inability to perform the task and 4 representing independence. The scores of each section are added together for a total score of 56. The higher the patients score, the more independent the patient is. Any score below 45 indicates increased risk for falls. Tool Used: Dynamic Gait Index  Score:  Initial: 17/24 Most Recent: X/24 (Date: -- )   Interpretation of Score: Each section is scored on a 0-3 scale, 0 representing the patients inability to perform the task and 3 representing independence. The scores of each section are added together for a total score of 24. Any score below 19 indicates increased risk for falls. Tool Used: Dizziness Handicap Index  Score:  Initial: 46  Most Recent: X (Date: -- )   Interpretation of Score: To each item, the following scores may be assigned: No = 0, Sometimes = 2, Yes = 4. Scores greater than 10 points should be referred to balance specialists for further evaluation. Medical Necessity:   · Patient is expected to demonstrate progress in strength, balance and functional technique to improve safety during daily activities. Reason for Services/Other Comments:  · Patient continues to demonstrate capacity to improve balance, dizziness which will increase independence and increase safety.    Use of outcome tool(s) and clinical judgement create a POC that gives a: Questionable prediction of patient's progress: MODERATE COMPLEXITY            TREATMENT:   (In addition to Assessment/Re-Assessment sessions the following treatments were rendered)  Pre-treatment Symptoms/Complaints:  Dizziness with movement. Exercises stir up the dizziness. Getting over an intestinal bug.   Pain: Initial:   Pain Intensity 1: 0 0/10 pain;   3/10 dizziness today Post Session:  1-2/10 dizziness     NEUROMUSCULAR RE-EDUCATION: (45 minutes):  Exercise/activities per grid below to improve balance, kinesthetic sense, posture and proprioception. Required minimal verbal cues to promote static and dynamic balance in standing. Balance/Vestibular Treatment:   Activity   Date  3/13/19 Date Date Date Date Date   Activity/Exercise   Sets/reps/equipment Sets/reps/  equipment Sets/reps/  equipment Sets/reps/  equipment Sets/reps/  equipment Sets/reps/  equipment   Walking with head turns     4 laps in hallway        Walking with head up & down     4 laps in hallway        Step overs             Step taps     6 inch step x 30 reps fwd and cross        Marching   4 laps in hallway        Sidestepping   4 laps in hallway        Crossovers           Earle           Walking  backwards             Tandem walking           Weaving in/out of cones             Picking up cones             Standing 360 degree turns           [employer] ball           Figure 8s    2 x 3 reps        Circles right/left   2 x 3 reps each direction; more dizziness to R, but decreased with repetition        Walking with 360 degree turns           Spirals           Weight shifting:    Left & Right             Weight shifting:   Forward & Backward              Static Standing Balance             Standing with feet apart     Blue foams eyes open with head turns and up/down and eyes  closed        Standing with feet together             Standing with feet semitandem   Blue foams eyes open with head turns and up/down and eyes  Closed in each position        Standing with feet tandem           Single leg stance           X1/X2 Viewing exercises     standing x1 viewing 30 sec x 2 each directionwith checkerboard        Hallpike-[place] testing for BPPV (Benign Paroxysmal Positional Vertigo)             Rainey-Pasquale exercises           Canalith Repositioning treatment/Epley Maneuver  for BPPV (Benign Paroxysmal Positional Vertigo)           [employer] Training: See scanned report. [employer] Portal  Treatment/Session Assessment:    · Response to Treatment:  Patient tolerated session well. She reported less dizziness after exercises and demonstrated good balance. Patient tends to have more dizziness with turns to the R versus L. Continue to progress as tolerated. · Compliance with Program/Exercises: Compliant most of the time. · Recommendations/Intent for next treatment session: \"Next visit will focus on advancements to more challenging activities\".   Total Treatment Duration:  PT Patient Time In/Time Out  Time In: 9484  Time Out: [address] [facility], PT    Future Appointments   Date Time Provider Enzo Pulido   3/14/2019  9:45 AM Zeinab Singletary PT [facility]   3/18/2019 11:15 AM MD GINA Landon [facility]   3/19/2019 11:15 AM Zeinab Singletary PT [facility]   3/21/2019  9:45 AM Zeinab Singletary PT [facility]   3/26/2019  9:45 AM Zeinab Singletary PT [facility]   3/28/2019  9:45 AM Zeinab Singletary, PT [facility]   4/2/2019 11:15 AM Ivanna Gibson MD [facility]   4/20/2019  9:00 AM SFE [facility] ROOM 3 [facility]   8/8/2019  1:00 PM Jyacob Enamorado MD [facility]

## 2019-03-14 ENCOUNTER — HOSPITAL ENCOUNTER (OUTPATIENT)
Dept: PHYSICAL THERAPY | Age: 74
Discharge: HOME | End: 2019-03-14
Attending: FAMILY MEDICINE
Payer: MEDICARE

## 2019-03-14 PROCEDURE — 97112 NEUROMUSCULAR REEDUCATION: CPT

## 2019-03-14 NOTE — PROGRESS NOTES
Celestino Baumgarten  : 1945  [place]  Secondary: Jt Huerta 75 at [address] [facility]  [address] [facility], [address] [place] [address],[address], [address].  [phone]   [phone]           OUTPATIENT PHYSICAL THERAPY:Daily Note 3/14/2019   ICD-10: Treatment Diagnosis: Other abnormalities of gait and mobility R26.89; Dizziness and giddiness R42  Precautions/Allergies:   Codeine; Morphine; Amoxicillin-pot clavulanate; Ciprofloxacin; Demerol [meperidine]; Gatifloxacin; Gluten protein; Sulfamethoxazole-trimethoprim; Telithromycin; Bactrim [sulfamethoprim ds]; Cephalosporins; Gluten; Omnicef [cefdinir]; Phenergan [promethazine]; and Synthroid [levothyroxine]   MD Orders: eval and treat MEDICAL/REFERRING DIAGNOSIS:  Dizziness [R42]   DATE OF ONSET: 7 weeks ago. REFERRING PHYSICIAN: Laurence Cummings MD  RETURN PHYSICIAN APPOINTMENT:      INITIAL ASSESSMENT:  Ms. Wesly Watts presents with dizziness and imbalance, improving over the last several weeks. Patient demonstrates imbalance with testing and reports that she is not functionally back to her normal daily activities at this point. Patient would benefit from PT to address these problems to improve patient's independence and safety with mobility and daily activities. Thank you. PROBLEM LIST (Impacting functional limitations):  1. Decreased Transfer Abilities  2. Decreased Balance  3. Decreased Activity Tolerance  4. Decreased [place] with Home Exercise Program INTERVENTIONS PLANNED: (Treatment may consist of any combination of the following)  1. Balance Exercise  2. Home Exercise Program (HEP)  3. Neuromuscular Re-education/Strengthening  4. Therapeutic Activites  5. Therapeutic Exercise/Strengthening   6. Vestibular and habituation training   TREATMENT PLAN:  Effective Dates: 3/6/2019 TO 2019 (90 days).   Frequency/Duration: 1-2 times a week for 60- 90 Day(s)  GOALS: (Goals have been discussed and agreed upon with patient.)  Short-Term Functional Goals: Time Frame: 2-4 weeks  1. Patient will demonstrate independence and compliance with home exercise program to improve balance and dizziness for daily activities. MET  2. Patient will increase her score on the dynamic gait index to greater than or equal to 19/24 indicating improved balance and safety for community ambulation. 3.   Discharge Goals: Time Frame: 8-12 weeks  1. Patient will report decreased dizziness to less than or equal to 1/10 with daily activities to improve safety and quality of life. 2. Patient will increase her score on the dynamic gait index to greater than or equal to 23/24 indicating improved balance and safety for community ambulation. 3. Patient will increase her score on the Alejandra Balance Scale to greater than or equal to 52/56 indicating improved safety and decreased fall risk for daily activities. 4. Patient will ambulate with least assistive device over level and unlevel surfaces without evidence of imbalance to improve safety for daily activities. 5. Patient will demonstrate improved score on the Dizziness Handicap Inventory to less than or equal to 15 indicating decreased perceived dizziness and imbalance with daily activities. Rehabilitation Potential For Stated Goals: Good              The information in this section was collected on 3/6/19 (except where otherwise noted). HISTORY:   History of Present Injury/Illness (Reason for Referral):  7 weeks ago, went to hospital with vertigo. Checked heart, okay but very dehydrated. Just finished Home health PT for 5 weeks for dizziness and balance. Moving around stirs up dizziness. Being still is fine. No falls. Ears feel plugged but no recent change in hearing. No changes in vision. No tinnitus. Tend to drift to the R when walking or moving too quick. Gets swimmy headed if move head too quick but goes away. Sleeping well.  Not moving at pace like usual. Usually watches two grandchildren, driving, cleaning house. Wants to get back to that. Past Medical History/Comorbidities:   Ms. Jen Trevino  has a past medical history of Acid reflux, Arrhythmia (2013), Arthritis, Asthma, Bronchitis, CAD (coronary artery disease), Essential hypertension (6/13/2016), GERD (gastroesophageal reflux disease), Gluten intolerance, H/O echocardiogram (2013), H/O exercise stress test (2013), Headache, Hearing loss, Hiatal hernia, Hypercholesterolemia, Hypothyroidism, IBS (irritable bowel syndrome), Memory difficulties (5/24/2018), Menopause, Osteopenia (10/2/2015), Pap smear for cervical cancer screening (2012), Plantar fasciitis, and Supraventricular premature beats (6/23/2015). Ms. Jen Trevino  has a past surgical history that includes hx cholecystectomy (1977); hx orthopaedic (Right); pr lap,tubal cautery; hx open cholecystectomy (1977); hx tubal ligation (Bilateral, 1981); hx gyn (2004); hx cataract removal (Bilateral, 2014); hx tonsillectomy (1950); hx dilation and curettage (1994; 2003); hx wisdom teeth extraction (1964); hx open reduction internal fixation (Right, 1995); hx cataract removal (06/30/2014); and hx colonoscopy (2014). Social History/Living Environment:     lives with daughter, son in law and 2 small children; retired teacher  Prior Level of Function/Work/Activity:  Independent, driving, taking care of grandchildren  Dominant Side:         RIGHT  Previous Treatment Approaches:          HHPT  Personal Factors:          Sex:  female        Age:  76 y.o. Ambulatory/Rehab Services H2 Model Falls Risk Assessment    Risk Factors:       (1)  Dizziness/Vertigo Ability to Rise from Chair:       (0)  Ability to rise in a single movement    Falls Prevention Plan:       No modifications necessary   Total: (5 or greater = High Risk): 1    ©2010 [facility] [employer]. All Rights Reserved. [facility] Patent #[id number].  Federal Law prohibits the replication, distribution or use without written permission from [facility]     Current Medications:       Current Outpatient Medications:     levothyroxine (SYNTHROID) 88 mcg tablet, Take 1 Tab by mouth Daily (before breakfast). , Disp: 1 Tab, Rfl: 0    BABY ASPIRIN PO, Take  by mouth daily. , Disp: , Rfl:     predniSONE (STERAPRED DS) 10 mg dose pack, TAD over 6 days, Disp: 1 Package, Rfl: 0    atorvastatin (LIPITOR) 10 mg tablet, TAKE 1 TABLET BY MOUTH DAILY, Disp: 90 Tab, Rfl: 0    nitroglycerin (NITROSTAT) 0.4 mg SL tablet, PLACE 1 TABLET UNDER THE TONGUE EVERY FIVE MINUTES AS NEEDED FOR CHEST PAIN, Disp: , Rfl:     TURMERIC ROOT EXTRACT PO, Take  by mouth., Disp: , Rfl:     cholecalciferol (VITAMIN D3) 1,000 unit cap, Take  by mouth daily. , Disp: , Rfl:     ketotifen (ZADITOR) 0.025 % (0.035 %) ophthalmic solution, Administer 1 Drop to both eyes two (2) times a day., Disp: , Rfl:     fluticasone-vilanterol (BREO ELLIPTA) 100-25 mcg/dose inhaler, Take 1 Puff by inhalation daily. , Disp: , Rfl:     levalbuterol (XOPENEX) 1.25 mg/0.5 mL nebu, 1.25 mg., Disp: , Rfl:     lisinopril (PRINIVIL, ZESTRIL) 20 mg tablet, Take 1 Tab by mouth two (2) times a day. Indications: hypertension (Patient taking differently: Take 20 mg by mouth daily. Indications: hypertension), Disp: 180 Tab, Rfl: 3    famotidine (PEPCID) 20 mg tablet, Take  by mouth two (2) times a day. 20 mg qam, 10 mg qpm. Per GI, Disp: , Rfl: 1    dicyclomine (BENTYL) 10 mg capsule, Take 1 Cap by mouth three (3) times daily as needed. Per GI, Disp: , Rfl: 5    B INFANTIS/B ANI/B IVONNE/B BIFID (PROBIOTIC 4X PO), Take  by mouth daily. , Disp: , Rfl:     Cetirizine (ZYRTEC) 10 mg cap, Take  by mouth., Disp: , Rfl:     azelastine (ASTELIN) 137 mcg nasal spray, 1 spray two (2) times a day. Use in each nostril as directed, Disp: , Rfl:     montelukast (SINGULAIR) 10 mg tablet, Take 10 mg by mouth daily. , Disp: , Rfl:     albuterol (PROAIR HFA) 90 mcg/actuation inhaler, Take  by inhalation. , Disp: , Rfl:    Date Last Reviewed:  3/14/19   Number of Personal Factors/Comorbidities that affect the Plan of Care: 1-2: MODERATE COMPLEXITY   EXAMINATION:   Observation/Orthostatic Postural Assessment:          Mild forward head posture  Strength:          4+/5 B LE  Functional Mobility:         Gait/Ambulation:  Patient ambulates with no AD with a normal gait pattern but demonstrates path deviations, particularly to the R. Transfers:  Independent, no UE assist for sit to stand        Bed Mobility:  independent        Stairs:  With rail  Sensation:         intact  Postural Control & Balance:  · Alejandra Balance Scale:  47/56.   (A score less than 45/56 indicates high risk of falls)     · Dynamic Gait Index:  17/24. (A score less than or equal to19/24 is abnormal and predictive of falls)     · Pulsityt Sensory Organization Test: NT      Oculomotor Exam:  · Eye Range of Motion:  full range of motion  · Cervical Range of Motion:   diminished range of motion  · Spontaneous nystagmus:  NO  · Gaze holding nystagmus:  NO  · Skew Deviation:  none  · Smooth Pursuit:      [x]Smooth Eye Movements    []Delayed Eye Movements     []Within Normal Limits     [x]Other(comment): no dizziness  · Voluntary Saccades:      [x]Smooth Eye Movements    []Delayed Eye Movements     []Within Normal Limits     [x]Other(comment): no dizziness  · Optokinetic cloth: NT  Vestibular Ocular Reflex Testing:  · Dynamic Visual Acuity Test:  NT  · Head Thrust Test: Negative to the right. Negative to the left. · VOR Cancellation: WNL  Position Tests:  · Hallpike-Alston testing: N/A       Body Structures Involved:  1. Nerves  2. Eyes and Ears  3. Muscles Body Functions Affected:  1. Sensory/Pain  2. Neuromusculoskeletal  3. Movement Related Activities and Participation Affected:  1. General Tasks and Demands  2. Mobility  3. Domestic Life  4.  Community, Social and [place]   Number of elements (examined above) that affect the Plan of Care: 3: MODERATE COMPLEXITY   CLINICAL PRESENTATION:   Presentation: Evolving clinical presentation with changing clinical characteristics: MODERATE COMPLEXITY   CLINICAL DECISION MAKING:   Outcome Measure: Tool Used: Alejandra Balance Scale  Score:  Initial: 47/56 Most Recent: X/56 (Date: -- )   Interpretation of Score: Each section is scored on a 0-4 scale, 0 representing the patients inability to perform the task and 4 representing independence. The scores of each section are added together for a total score of 56. The higher the patients score, the more independent the patient is. Any score below 45 indicates increased risk for falls. Tool Used: Dynamic Gait Index  Score:  Initial: 17/24 Most Recent: X/24 (Date: -- )   Interpretation of Score: Each section is scored on a 0-3 scale, 0 representing the patients inability to perform the task and 3 representing independence. The scores of each section are added together for a total score of 24. Any score below 19 indicates increased risk for falls. Tool Used: Dizziness Handicap Index  Score:  Initial: 46  Most Recent: X (Date: -- )   Interpretation of Score: To each item, the following scores may be assigned: No = 0, Sometimes = 2, Yes = 4. Scores greater than 10 points should be referred to balance specialists for further evaluation. Medical Necessity:   · Patient is expected to demonstrate progress in strength, balance and functional technique to improve safety during daily activities. Reason for Services/Other Comments:  · Patient continues to demonstrate capacity to improve balance, dizziness which will increase independence and increase safety. Use of outcome tool(s) and clinical judgement create a POC that gives a: Questionable prediction of patient's progress: MODERATE COMPLEXITY            TREATMENT:   (In addition to Assessment/Re-Assessment sessions the following treatments were rendered)  Pre-treatment Symptoms/Complaints:  Doing okay. Still dizzy. Just tired after last session. Pain: Initial:   Pain Intensity 1: 0 0/10 pain;   3/10 dizziness today when I move, lenora when I move too fast. Post Session:  1-2/10 dizziness     NEUROMUSCULAR RE-EDUCATION: (40 minutes):  Exercise/activities per grid below to improve balance, kinesthetic sense, posture and proprioception. Required minimal verbal cues to promote static and dynamic balance in standing. Balance/Vestibular Treatment:   Activity   Date  3/13/19 Date  3/14/19 Date Date Date Date   Activity/Exercise   Sets/reps/equipment Sets/reps/  equipment Sets/reps/  equipment Sets/reps/  equipment Sets/reps/  equipment Sets/reps/  equipment   Walking with head turns     4 laps in hallway 6 laps in hallway       Walking with head up & down     4 laps in hallway 6 laps in hallway       Step overs             Step taps     6 inch step x 30 reps fwd and cross 6 inch step x 30 reps fwd and cross       Marching   4 laps in hallway 4 laps in hallway       Sidestepping   4 laps in hallway 4 laps in hallway       Crossovers           [place]           Walking  backwards             Tandem walking           Weaving in/out of cones             Picking up cones             Standing 360 degree turns           Ball toss           Standing ball circles    2 x 10 reps each direction       Figure 8s    2 x 3 reps 3 x 3 reps       Circles right/left   2 x 3 reps each direction; more dizziness to R, but decreased with repetition 3 x 3 reps each direction; more dizziness to R, but decreased with repetition       Walking with 360 degree turns           Spirals           Weight shifting:    Left & Right             Weight shifting:   Forward & Backward              Static Standing Balance             Standing with feet apart     Blue foams eyes open with head turns and up/down and eyes  closed Blue foams eyes open with head turns and up/down and eyes  closed       Standing with feet together             Standing with feet semitandem   Blue foams eyes open with head turns and up/down and eyes  Closed in each position Blue foams eyes open with head turns and up/down and eyes  Closed in each position       Standing with feet tandem           Single leg stance           X1/X2 Viewing exercises     standing x1 viewing 30 sec x 2 each directionwith checkerboard standing x1 viewing 30 sec x 2 each directionwith checkerboard       Hallpike-Lolita testing for BPPV (Benign Paroxysmal Positional Vertigo)             Rainey-Daroff exercises           Canalith Repositioning treatment/Epley Maneuver  for BPPV (Benign Paroxysmal Positional Vertigo)           Smart Equitest Training: See scanned report. [employer] Portal  Treatment/Session Assessment:    · Response to Treatment:  Patient tolerated session well. She reported a little dizziness with exercises, but it seemed to resolve more quickly today. Continue to progress as tolerated. · Compliance with Program/Exercises: Compliant most of the time. · Recommendations/Intent for next treatment session: \"Next visit will focus on advancements to more challenging activities\".   Total Treatment Duration:  PT Patient Time In/Time Out  Time In: 0945  Time Out: [place] [address], PT    Future Appointments   Date Time Provider Enzo Pulido   3/18/2019 11:15 AM Laurence Cummings MD [facility]   3/19/2019 11:15 AM Phoebe Montgomery, PT [facility]   3/21/2019  9:45 AM Phoebe Montgomery, PT [facility]   3/26/2019  9:45 AM Phoebe Montgomery, PT [facility]   3/28/2019  9:45 AM Phoebe Montgomery, PT [facility]   4/2/2019 11:15 AM Mariah Bustillo MD [facility]   4/20/2019  9:00 AM S[facility] ROOM 3 [facility] E   8/8/2019  1:00 PM Laurence Cummings MD [facility]

## 2019-03-19 ENCOUNTER — HOSPITAL ENCOUNTER (OUTPATIENT)
Dept: PHYSICAL THERAPY | Age: 74
Discharge: HOME | End: 2019-03-19
Attending: FAMILY MEDICINE
Payer: MEDICARE

## 2019-03-19 PROCEDURE — 97112 NEUROMUSCULAR REEDUCATION: CPT

## 2019-03-19 NOTE — PROGRESS NOTES
Kelle Claros  : 1945  [place]  Secondary: Jt Epperson at [place] [address] [facility], [address], [place] [address].  [phone]   [phone]         OUTPATIENT PHYSICAL THERAPY: Daily Treatment Note 3/19/2019  Pre-treatment Symptoms/Complaints:  Doing okay. A little dizzy. MD increased my thyroid dosage, it's been a day. Pain: Initial: Pain Intensity 1: 0 0, dizziness 2/10 Post Session:  0/10   Medications Last Reviewed:  3/19/19  Updated Objective Findings:  None Today   TREATMENT:       NEUROMUSCULAR RE-EDUCATION: (45 minutes):  Exercise/activities per grid below to improve balance, coordination, kinesthetic sense, posture and proprioception. Required minimal verbal cues to promote static and dynamic balance in standing.   Balance/Vestibular Treatment:   Activity    Date  3/13/19 Date  3/14/19 Date  3/19/19 Date Date Date   Activity/Exercise    Sets/reps/equipment Sets/reps/  equipment Sets/reps/  equipment Sets/reps/  equipment Sets/reps/  equipment Sets/reps/  equipment   Walking with head turns      4 laps in hallway 6 laps in hallway  6 laps in hallway         Walking with head up & down      4 laps in hallway 6 laps in hallway  6 laps in hallway         Walking with head diagonals             4 laps in hallway each direction         Step taps       6 inch step x 30 reps fwd and cross 6 inch step x 30 reps fwd and cross          Marching    4 laps in hallway 4 laps in hallway  4 laps in hallway         Sidestepping    4 laps in hallway 4 laps in hallway  4 laps in hallway         Crossovers                  [place]                  Walking  backwards                    Tandem walking                  Weaving in/out of cones                    Picking up cones                   Standing 360 degree turns                  Ball toss                  Standing ball circles      2 x 10 reps each direction  2 x 10 reps each direction         Figure 8s     2 x 3 reps 3 x 3 reps  3 x 3 reps         Circles right/left    2 x 3 reps each direction; more dizziness to R, but decreased with repetition 3 x 3 reps each direction; more dizziness to R, but decreased with repetition  3 x 3 reps each direction; more dizziness to R, but decreased with repetition         Walking with 360 degree turns                  Spirals                  Weight shifting:    Left & Right                     Weight shifting: Forward & Backward                      Static Standing Balance                     Standing with feet apart       Blue foams eyes open with head turns and up/down and eyes  closed Blue foams eyes open with head turns and up/down and eyes  closed  Blue foams and sanddune eyes open with head turns and up/down and eyes  closed         Standing with feet together                     Standing with feet semitandem    Blue foams eyes open with head turns and up/down and eyes  Closed in each position Blue foams eyes open with head turns and up/down and eyes  Closed in each position  Blue foams eyes open with head turns and up/down and eyes  Closed in each position         Standing with feet tandem                  Single leg stance                  X1/X2 Viewing exercises       standing x1 viewing 30 sec x 2 each directionwith checkerboard standing x1 viewing 30 sec x 2 each directionwith checkerboard  standing x1 viewing 30 sec x 2 each direction with checkerboard         Hallpike-Armour testing for BPPV (Benign Paroxysmal Positional Vertigo)                     Rainey-Daroff exercises                  Canalith Repositioning treatment/Epley Maneuver  for BPPV (Benign Paroxysmal Positional Vertigo)                  Smart Equitest Training: See scanned report.                             [employer] Portal  Treatment/Session Summary:    · Response to Treatment:  Patient tolerated session well.  She maintained balance well with smaill increases in dizziness which resolved with short rest breaks. .  · Communication/Consultation:  None today  · Equipment provided today:  None today  · Recommendations/Intent for next treatment session: Next visit will focus on continuing balance and habituation training. .  Treatment Plan of Care Effective Dates: 3/6/2019 TO 6/4/2019   Total Treatment Billable Duration:  45 minutes  PT Patient Time In/Time Out  Time In: 1162  Time Out: [place] 3, PT    Future Appointments   Date Time Provider Enzo Pulido   3/21/2019  9:45 AM Sanford Bridges, PT [facility]   3/26/2019  9:45 AM Sanford Bridges, PT [facility]   3/28/2019  9:45 AM Sanford Bridges, PT [facility]   4/2/2019 11:15 AM Fina Michaels MD [facility]   4/20/2019  9:00 AM [facility]   6/18/2019 10:00 AM Shekhar Marques MD [facility]   8/8/2019  1:00 PM Shekhar Marques MD [facility]

## 2019-03-21 ENCOUNTER — HOSPITAL ENCOUNTER (OUTPATIENT)
Dept: PHYSICAL THERAPY | Age: 74
Discharge: HOME | End: 2019-03-21
Attending: FAMILY MEDICINE
Payer: MEDICARE

## 2019-03-21 PROCEDURE — 97112 NEUROMUSCULAR REEDUCATION: CPT

## 2019-03-21 NOTE — PROGRESS NOTES
Claudetta Gubler  : 1945  [place]  Secondary: Jt Epperson at [place] [address], [address], [address] [facility]  [phone]   [phone]         OUTPATIENT PHYSICAL THERAPY: Daily Treatment Note 3/21/2019  Pre-treatment Symptoms/Complaints:  Doing okay. A little dizzy when first get up but goes away quickly. Getting better. Pain: Initial: Pain Intensity 1: 0 0, dizziness 1/10 Post Session:  0/10  Dizziness 1/10   Medications Last Reviewed:  3/21/19  Updated Objective Findings:  None Today   TREATMENT:       NEUROMUSCULAR RE-EDUCATION: (41minutes):  Exercise/activities per grid below to improve balance, coordination, kinesthetic sense, posture and proprioception. Required minimal verbal cues to promote static and dynamic balance in standing.   Balance/Vestibular Treatment:   Activity    Date  3/13/19 Date  3/14/19 Date  3/19/19 Date  3/21/19 Date Date   Activity/Exercise    Sets/reps/equipment Sets/reps/  equipment Sets/reps/  equipment Sets/reps/  equipment Sets/reps/  equipment Sets/reps/  equipment   Walking with head turns      4 laps in hallway 6 laps in hallway  6 laps in hallway   6 laps in hallway       Walking with head up & down      4 laps in hallway 6 laps in hallway  6 laps in hallway   6 laps in hallway       Walking with head diagonals             4 laps in hallway each direction  4 laps in hallway each direction       Step taps       6 inch step x 30 reps fwd and cross 6 inch step x 30 reps fwd and cross          Marching    4 laps in hallway 4 laps in hallway  4 laps in hallway   4 laps in hallway       Sidestepping    4 laps in hallway 4 laps in hallway  4 laps in hallway   4 laps in hallway       Crossovers                  [place]                  Walking  backwards                    Tandem walking                  Weaving in/out of cones                    Picking up cones                 Standing 360 degree turns                  Ball toss                  Standing ball circles      2 x 10 reps each direction  2 x 10 reps each direction  2 x 10 reps each direction       Figure 8s     2 x 3 reps 3 x 3 reps  3 x 3 reps   3 x 3 reps       Circles right/left    2 x 3 reps each direction; more dizziness to R, but decreased with repetition 3 x 3 reps each direction; more dizziness to R, but decreased with repetition  3 x 3 reps each direction; more dizziness to R, but decreased with repetition  3 x 3 reps each direction; more dizziness to R, but decreased with repetition       Walking with 360 degree turns                  Spirals                  Weight shifting:    Left & Right                     Weight shifting:   Forward & Backward                      Static Standing Balance                     Standing with feet apart       Blue foams eyes open with head turns and up/down and eyes  closed Blue foams eyes open with head turns and up/down and eyes  closed  Blue foams and sanddune eyes open with head turns and up/down and eyes  closed  Blue foams and rockerboard eyes open with head turns and up/down and eyes  closed       Standing with feet together                     Standing with feet semitandem    Blue foams eyes open with head turns and up/down and eyes  Closed in each position Blue foams eyes open with head turns and up/down and eyes  Closed in each position  Blue foams eyes open with head turns and up/down and eyes  Closed in each position  Blue foams eyes open with head turns and up/down and eyes  Closed in each position       Standing with feet tandem                  Single leg stance                  X1/X2 Viewing exercises       standing x1 viewing 30 sec x 2 each directionwith checkerboard standing x1 viewing 30 sec x 2 each directionwith checkerboard  standing x1 viewing 30 sec x 2 each direction with checkerboard  standing x1 viewing 30 sec x 4 each direction with checkerboard on blue foams       Hallpike-[place] testing for BPPV (Benign Paroxysmal Positional Vertigo)                     Rainey-Daroff exercises                  Canalith Repositioning treatment/Epley Maneuver  for BPPV (Benign Paroxysmal Positional Vertigo)                  Smart Equit[employer] Training: See scanned report.                             [employer]  Treatment/Session Summary:    · Response to Treatment:  Patient did well with exercises. She reports small increases in dizziness with movement and exercises but resolves quickly. Need to work on balance on [address] .  · Communication/Consultation:  None today  · Equipment provided today:  None today  · Recommendations/Intent for next treatment session: Next visit will focus on continuing balance and habituation training. Rockerboard balance exercises.    Treatment Plan of Care Effective Dates: 3/6/2019 TO 6/4/2019   Total Treatment Billable Duration:  41 minutes  PT Patient Time In/Time Out  Time In: 0945  Time Out: IN[facility] Berg    Future Appointments   Date Time Provider Enzo Pulido   3/26/2019  9:45 AM [facility]   3/28/2019  9:45 AM [facility]   4/2/2019 11:15 AM Serenity Santiago MD [facility]   4/20/2019  9:00 AM [facility] ROOM 3 [facility]   6/18/2019 10:00 AM Tawanna Spurling, MD [facility]   8/8/2019  1:00 PM Tawanna Spurling, MD [facility]

## 2019-03-26 ENCOUNTER — HOSPITAL ENCOUNTER (OUTPATIENT)
Dept: PHYSICAL THERAPY | Age: 74
Discharge: HOME | End: 2019-03-26
Attending: FAMILY MEDICINE
Payer: MEDICARE

## 2019-03-26 PROCEDURE — 97112 NEUROMUSCULAR REEDUCATION: CPT

## 2019-03-26 NOTE — PROGRESS NOTES
Escobar Ashanti  : 1945  [place]  Secondary: Jt Epperson at [address], [address], [place] [address].  [phone]   [phone]         OUTPATIENT PHYSICAL THERAPY: Daily Treatment Note 3/26/2019  Pre-treatment Symptoms/Complaints:  Less dizziness today than I have in a week or so. No falls. Pain: Initial: Pain Intensity 1: 0 0, dizziness 1/10 Post Session:  0/10  Dizziness 1/10   Medications Last Reviewed:  3/26/19  Updated Objective Findings:  None Today   TREATMENT:       NEUROMUSCULAR RE-EDUCATION: (40 minutes):  Exercise/activities per grid below to improve balance, coordination, kinesthetic sense, posture and proprioception. Required minimal verbal cues to promote static and dynamic balance in standing. Balance/Vestibular Treatment:   Activity    Date  3/26/19 Date Date   Activity/Exercise    Sets/reps/  equipment Sets/reps/  equipment Sets/reps/  equipment   Walking with head turns        6 laps in hallway       Walking with head up & down        6 laps in hallway       Walking with head diagonals        4 laps in hallway each direction       Step taps               Marching      4 laps in hallway       Sidestepping      4 laps in hallway       Crossovers            [place]            Walking  backwards              Tandem walking            Weaving in/out of cones              Picking up cones             Standing 360 degree turns            Ball toss            Standing ball circles     2 x 10 reps each direction       Figure 8s       3 x 3 reps       Circles right/left     3 x 3 reps each direction       Walking with 360 degree turns            Spirals            Weight shifting:    Left & Right               Weight shifting:   Forward & Backward                Static Standing Balance               Standing with feet apart        Blue foams and rockerboard eyes open with head turns and up/down and eyes  closed       Standing with feet together               Standing with feet semitandem     Blue foams eyes open with head turns and up/down and eyes  Closed in each position       Standing with feet tandem            Single leg stance            X1/X2 Viewing exercises        standing x1 viewing 30 sec x 4 each direction with checkerboard on blue foams       Hallpike-[place] testing for BPPV (Benign Paroxysmal Positional Vertigo)               Rainey-Daroff exercises            Canalith Repositioning treatment/Epley Maneuver  for BPPV (Benign Paroxysmal Positional Vertigo)            Smart Equ[employer] Training: See scanned report.                       [employer]  Treatment/Session Summary:    · Response to Treatment:  Patient tolerated session well. She reported little to no dizziness with exercises today. Will continue for about 2 more weeks, as long as nothing gets worse. . Reassess this week! · Communication/Consultation:  None today  · Equipment provided today:  None today  · Recommendations/Intent for next treatment session: Next visit will focus on continuing balance and habituation training. Rockerboard balance exercises.    Treatment Plan of Care Effective Dates: 3/6/2019 TO 6/4/2019   Total Treatment Billable Duration:  40 minutes  PT Patient Time In/Time Out  Time In: 7642  Time Out: Jerry [id number], PT    Future Appointments   Date Time Provider Enzo Pulido   3/28/2019  9:45 AM Aster Edmondson, [facility]   4/2/2019 11:15 AM Rolf Rudd MD [facility]   4/20/2019  9:00 AM [facility] ROOM 3 [facility]   6/18/2019 10:00 AM Soniya Sanderson MD [facility]   8/8/2019  1:00 PM Snoiya Sanderson MD [facility]

## 2019-03-28 ENCOUNTER — HOSPITAL ENCOUNTER (OUTPATIENT)
Dept: PHYSICAL THERAPY | Age: 74
Discharge: HOME | End: 2019-03-28
Attending: FAMILY MEDICINE
Payer: MEDICARE

## 2019-03-28 PROCEDURE — 97112 NEUROMUSCULAR REEDUCATION: CPT

## 2019-03-28 NOTE — PROGRESS NOTES
Flaca Deb  : 1945  [place]  Secondary: Jt Epperson at [address] [place] [address], [address], [place] [address].  [phone]   [phone]         OUTPATIENT PHYSICAL THERAPY: Daily Treatment Note 3/28/2019  Pre-treatment Symptoms/Complaints:  DOING FINE BUT DIZZY THIS MORNING. Pain: Initial: Pain Intensity 1: 0 0, dizziness 2/10 Post Session:  0/10  Dizziness 1/10   Medications Last Reviewed:  3/28/19  Updated Objective Findings:  None Today   TREATMENT:       NEUROMUSCULAR RE-EDUCATION: (40 minutes):  Exercise/activities per grid below to improve balance, coordination, kinesthetic sense, posture and proprioception. Required minimal verbal cues to promote static and dynamic balance in standing. Balance/Vestibular Treatment:   Activity    Date  3/28/19 Date Date   Activity/Exercise    Sets/reps/  equipment Sets/reps/  equipment Sets/reps/  equipment   Walking with head turns        6 laps in hallway       Walking with head up & down        6 laps in hallway       Walking with head diagonals        4 laps in hallway each direction       Step taps               Marching      4 laps in hallway       Sidestepping      4 laps in hallway       Crossovers            [place]            Walking  backwards              Tandem walking            Weaving in/out of cones              Picking up cones             Standing 360 degree turns            Ball toss            Standing ball circles     2 x 10 reps each direction       Figure 8s       3 x 3 reps       Circles right/left     3 x 3 reps each direction       Walking with 360 degree turns            Spirals            Weight shifting:    Left & Right               Weight shifting:   Forward & Backward                Static Standing Balance               Standing with feet apart        rockerboard and Blue foams and rockerboard eyes open with head turns and up/down and eyes  closed       Standing with feet together               Standing with feet semitandem     Blue foams eyes open with head turns and up/down and eyes  Closed in each position       Standing with feet tandem            Single leg stance            X1/X2 Viewing exercises        standing x1 viewing 30 sec x 4 each direction with checkerboard on blue foams       Hallpike-[place] testing for BPPV (Benign Paroxysmal Positional Vertigo)               Rainey-Daroff exercises            Canalith Repositioning treatment/Epley Maneuver  for BPPV (Benign Paroxysmal Positional Vertigo)            Smart Equ[employer] Training: See scanned report.                       [employer]  Treatment/Session Summary:    · Response to Treatment:  Patient tolerated session well with no increase in dizziness. continue to progress as tolerated. . Reassess next week! · Communication/Consultation:  None today  · Equipment provided today:  None today  · Recommendations/Intent for next treatment session: Next visit will focus on continuing balance and habituation training. Rockerboard balance exercises.    Treatment Plan of Care Effective Dates: 3/6/2019 TO 6/4/2019   Total Treatment Billable Duration:  40 minutes  PT Patient Time In/Time Out  Time In: 9663  Time Out: Jerry [id number], PT    Future Appointments   Date Time Provider Enzo Pulido   4/2/2019 11:15 AM Efrain Nj MD [facility]   4/3/2019 11:15 AM Alan Back, PT [facility]   4/4/2019  9:45 AM Laan Back, PT [facility]   4/9/2019  9:00 AM Alan Back, PT [facility]   4/11/2019  9:45 AM Alan Back, PT [facility]   4/20/2019  9:00 AM [facility] ROOM 3 [facility]   6/18/2019 10:00 AM MD GINA Weir [facility]   8/8/2019  1:00 PM [facility]

## 2019-04-03 ENCOUNTER — HOSPITAL ENCOUNTER (OUTPATIENT)
Dept: PHYSICAL THERAPY | Age: 74
Discharge: HOME | End: 2019-04-03
Attending: FAMILY MEDICINE
Payer: MEDICARE

## 2019-04-03 PROCEDURE — 97112 NEUROMUSCULAR REEDUCATION: CPT

## 2019-04-03 NOTE — PROGRESS NOTES
Rafael Franklin  : 1945  [place]  Secondary: Jt Huerta 75 at [place] [id number] [facility], [address] [place] [address],[address], [facility] [facility].  [phone]   [phone]           OUTPATIENT PHYSICAL THERAPY:Progress Report 4/3/2019   ICD-10: Treatment Diagnosis: Other abnormalities of gait and mobility R26.89; Dizziness and giddiness R42  Precautions/Allergies:   Codeine; Morphine; Amoxicillin-pot clavulanate; Ciprofloxacin; Demerol [meperidine]; Gatifloxacin; Gluten protein; Sulfamethoxazole-trimethoprim; Telithromycin; Bactrim [sulfamethoprim ds]; Cephalosporins; Gluten; Omnicef [cefdinir]; Phenergan [promethazine]; and Synthroid [levothyroxine]   MD Orders: eval and treat MEDICAL/REFERRING DIAGNOSIS:  Dizziness [R42]   DATE OF ONSET: 7 weeks ago. REFERRING PHYSICIAN: Neli Enriquez MD  RETURN PHYSICIAN APPOINTMENT:      INITIAL ASSESSMENT:  Ms. Cristina Menjivar presents with dizziness and imbalance, improving over the last several weeks. Patient demonstrates imbalance with testing and reports that she is not functionally back to her normal daily activities at this point. Patient would benefit from PT to address these problems to improve patient's independence and safety with mobility and daily activities. Thank you. PROGRESS NOTE 4/3/19: Ms. Cristina Menjivar has attended 8 PT visits from 3/6/19 to 4/3/19 for dizziness and imbalance, improving over the last several weeks. Patient is demonstrating improving balance and reduced dizziness with exercises. Patient would benefit from continuing  PT to address these problems to improve patient's independence and safety with mobility and daily activities. Thank you      PROBLEM LIST (Impacting functional limitations):  1. Decreased Transfer Abilities  2. Decreased Balance  3. Decreased Activity Tolerance  4.  Decreased [place] with Home Exercise Program INTERVENTIONS PLANNED: (Treatment may consist of any combination of the following)  1. Balance Exercise  2. Home Exercise Program (HEP)  3. Neuromuscular Re-education/Strengthening  4. Therapeutic Activites  5. Therapeutic Exercise/Strengthening   6. Vestibular and habituation training   TREATMENT PLAN:  Effective Dates: 3/6/2019 TO 6/4/2019 (90 days). Frequency/Duration: 1-2 times a week for 60- 90 Day(s)  GOALS: (Goals have been discussed and agreed upon with patient.)  Short-Term Functional Goals: Time Frame: 2-4 weeks  1. Patient will demonstrate independence and compliance with home exercise program to improve balance and dizziness for daily activities. MET  2. Patient will increase her score on the dynamic gait index to greater than or equal to 19/24 indicating improved balance and safety for community ambulation. MET  3. Discharge Goals: Time Frame: 8-12 weeks  1. Patient will report decreased dizziness to less than or equal to 1/10 with daily activities to improve safety and quality of life. Progressing and ongoing  2. Patient will increase her score on the dynamic gait index to greater than or equal to 23/24 indicating improved balance and safety for community ambulation. Progressing and ongoing  3. Patient will increase her score on the Alejandra Balance Scale to greater than or equal to 52/56 indicating improved safety and decreased fall risk for daily activities. MET  4. Patient will ambulate with least assistive device over level and unlevel surfaces without evidence of imbalance to improve safety for daily activities. Progressing and ongoing  5. Patient will demonstrate improved score on the Dizziness Handicap Inventory to less than or equal to 15 indicating decreased perceived dizziness and imbalance with daily activities. Progressing and ongoing  Rehabilitation Potential For Stated Goals: Good              The information in this section was collected on 3/6/19 (except where otherwise noted).   HISTORY:   History of Present Injury/Illness (Reason for Referral):  7 weeks ago, went to hospital with vertigo. Checked heart, okay but very dehydrated. Just finished Home health PT for 5 weeks for dizziness and balance. Moving around stirs up dizziness. Being still is fine. No falls. Ears feel plugged but no recent change in hearing. No changes in vision. No tinnitus. Tend to drift to the R when walking or moving too quick. Gets swimmy headed if move head too quick but goes away. Sleeping well. Not moving at pace like usual. Usually watches two grandchildren, driving, cleaning house. Wants to get back to that. Past Medical History/Comorbidities:   Ms. Benny Michelle  has a past medical history of Acid reflux, Arrhythmia (2013), Arthritis, Asthma, Bronchitis, CAD (coronary artery disease), Essential hypertension (6/13/2016), GERD (gastroesophageal reflux disease), Gluten intolerance, H/O echocardiogram (2013), H/O exercise stress test (2013), Headache, Hearing loss, Hiatal hernia, Hypercholesterolemia, Hypothyroidism, IBS (irritable bowel syndrome), Memory difficulties (5/24/2018), Menopause, Osteopenia (10/2/2015), Pap smear for cervical cancer screening (2012), Plantar fasciitis, and Supraventricular premature beats (6/23/2015). Ms. Benny Michelle  has a past surgical history that includes hx cholecystectomy (1977); hx orthopaedic (Right); pr lap,tubal cautery; hx open cholecystectomy (1977); hx tubal ligation (Bilateral, 1981); hx gyn (2004); hx cataract removal (Bilateral, 2014); hx tonsillectomy (1950); hx dilation and curettage (1994; 2003); hx wisdom teeth extraction (1964); hx open reduction internal fixation (Right, 1995); hx cataract removal (06/30/2014); and hx colonoscopy (2014).   Social History/Living Environment:     lives with daughter, son in law and 2 small children; retired teacher  Prior Level of Function/Work/Activity:  Independent, driving, taking care of grandchildren  Dominant Side:         RIGHT  Previous Treatment Approaches:          HHPT  Personal Factors: Sex:  female        Age:  76 y.o. Ambulatory/Rehab Services H2 Model Falls Risk Assessment    Risk Factors:       (1)  Dizziness/Vertigo Ability to Rise from Chair:       (0)  Ability to rise in a single movement    Falls Prevention Plan:       No modifications necessary   Total: (5 or greater = High Risk): 1    ©2010 [facility]. All Rights Reserved. [facility] [place] Patent #[id number]. Federal Law prohibits the replication, distribution or use without written permission from [facility]     Current Medications:       Current Outpatient Medications:     alendronate (FOSAMAX) 70 mg tablet, Take 1 Tab by mouth every seven (7) days. , Disp: 4 Tab, Rfl: 12    levothyroxine (SYNTHROID) 100 mcg tablet, Take 1 Tab by mouth Daily (before breakfast). , Disp: 90 Tab, Rfl: 3    atorvastatin (LIPITOR) 10 mg tablet, TAKE 1 TABLET BY MOUTH DAILY, Disp: 90 Tab, Rfl: 0    nitroglycerin (NITROSTAT) 0.4 mg SL tablet, PLACE 1 TABLET UNDER THE TONGUE EVERY FIVE MINUTES AS NEEDED FOR CHEST PAIN, Disp: , Rfl:     TURMERIC ROOT EXTRACT PO, Take  by mouth., Disp: , Rfl:     cholecalciferol (VITAMIN D3) 1,000 unit cap, Take  by mouth daily. , Disp: , Rfl:     ketotifen (ZADITOR) 0.025 % (0.035 %) ophthalmic solution, Administer 1 Drop to both eyes two (2) times a day., Disp: , Rfl:     fluticasone-vilanterol (BREO ELLIPTA) 100-25 mcg/dose inhaler, Take 1 Puff by inhalation daily. , Disp: , Rfl:     dicyclomine (BENTYL) 10 mg capsule, Take 1 Cap by mouth three (3) times daily as needed. Per GI, Disp: , Rfl: 5    B INFANTIS/B ANI/B IVONNE/B BIFID (PROBIOTIC 4X PO), Take  by mouth daily. , Disp: , Rfl:     Cetirizine (ZYRTEC) 10 mg cap, Take  by mouth., Disp: , Rfl:     montelukast (SINGULAIR) 10 mg tablet, Take 10 mg by mouth daily. , Disp: , Rfl:    Date Last Reviewed:  4/3/19   Number of Personal Factors/Comorbidities that affect the Plan of Care: 1-2: MODERATE COMPLEXITY   EXAMINATION: Observation/Orthostatic Postural Assessment:          Mild forward head posture  Strength:          4+/5 B LE  Functional Mobility:         Gait/Ambulation:  Patient ambulates with no AD with a normal gait pattern but demonstrates path deviations, particularly to the R. Transfers:  Independent, no UE assist for sit to stand        Bed Mobility:  independent        Stairs:  With rail  Sensation:         intact  Postural Control & Balance:  · Alejandra Balance Scale:  47/56.   (A score less than 45/56 indicates high risk of falls)     · Dynamic Gait Index:  17/24. (A score less than or equal to19/24 is abnormal and predictive of falls)     · IntraOp Medicalt Sensory Organization Test: NT      Oculomotor Exam:  · Eye Range of Motion:  full range of motion  · Cervical Range of Motion:   diminished range of motion  · Spontaneous nystagmus:  NO  · Gaze holding nystagmus:  NO  · Skew Deviation:  none  · Smooth Pursuit:      [x]Smooth Eye Movements    []Delayed Eye Movements     []Within Normal Limits     [x]Other(comment): no dizziness  · Voluntary Saccades:      [x]Smooth Eye Movements    []Delayed Eye Movements     []Within Normal Limits     [x]Other(comment): no dizziness  · Optokinetic cloth: NT  Vestibular Ocular Reflex Testing:  · Dynamic Visual Acuity Test:  NT  · Head Thrust Test: Negative to the right. Negative to the left. · VOR Cancellation: WNL  Position Tests:  · Hallpike-[place] testing: N/A       Body Structures Involved:  1. Nerves  2. Eyes and Ears  3. Muscles Body Functions Affected:  1. Sensory/Pain  2. Neuromusculoskeletal  3. Movement Related Activities and Participation Affected:  1. General Tasks and Demands  2. Mobility  3. Domestic Life  4.  Community, Social and [place]   Number of elements (examined above) that affect the Plan of Care: 3: MODERATE COMPLEXITY   CLINICAL PRESENTATION:   Presentation: Evolving clinical presentation with changing clinical characteristics: MODERATE COMPLEXITY CLINICAL DECISION MAKING:   Outcome Measure: Tool Used: Alejandra Balance Scale  Score:  Initial: 47/56 Most Recent:54/56 (Date: 4/3/19 )   Interpretation of Score: Each section is scored on a 0-4 scale, 0 representing the patients inability to perform the task and 4 representing independence. The scores of each section are added together for a total score of 56. The higher the patients score, the more independent the patient is. Any score below 45 indicates increased risk for falls. Tool Used: Dynamic Gait Index  Score:  Initial: 17/24 Most Recent: 20/24 (Date:4/3/19)   Interpretation of Score: Each section is scored on a 0-3 scale, 0 representing the patients inability to perform the task and 3 representing independence. The scores of each section are added together for a total score of 24. Any score below 19 indicates increased risk for falls. Tool Used: Dizziness Handicap Index  Score:  Initial: 46  Most Recent:14 (Date: 4/3/19)   Interpretation of Score: To each item, the following scores may be assigned: No = 0, Sometimes = 2, Yes = 4. Scores greater than 10 points should be referred to balance specialists for further evaluation. Medical Necessity:   · Patient is expected to demonstrate progress in strength, balance and functional technique to improve safety during daily activities. Reason for Services/Other Comments:  · Patient continues to demonstrate capacity to improve balance, dizziness which will increase independence and increase safety.    Use of outcome tool(s) and clinical judgement create a POC that gives a: Questionable prediction of patient's progress: MODERATE COMPLEXITY

## 2019-04-03 NOTE — PROGRESS NOTES
Adelina Moraes  : 1945  [place]  Secondary: Jt Epperson at [facility], [address] [place] [address],[address], [address] [facility]  [phone]   [phone]         OUTPATIENT PHYSICAL THERAPY: Daily Treatment Note 4/3/2019  Pre-treatment Symptoms/Complaints:  Dizziness seems to get worse with barometric pressure changes. Pain: Initial: Pain Intensity 1: 0 0, dizziness 2/10 Post Session:  0/10  Dizziness 1/10   Medications Last Reviewed: 4/3/19  Updated Objective Findings:  See progress note from today   TREATMENT:       NEUROMUSCULAR RE-EDUCATION: (40 minutes):  Exercise/activities per grid below to improve balance, coordination, kinesthetic sense, posture and proprioception. Required minimal verbal cues to promote static and dynamic balance in standing.   Balance/Vestibular Treatment:   Activity    Date  3/28/19 Date  4/3/19 Date   Activity/Exercise    Sets/reps/  equipment Sets/reps/  equipment Sets/reps/  equipment   Walking with head turns        6 laps in hallway   6 laps in hallway     Walking with head up & down        6 laps in hallway   6 laps in hallway     Walking with head diagonals        4 laps in hallway each direction  4 laps in hallway each direction     Step taps               Marching      4 laps in hallway   4 laps in hallway     Sidestepping      4 laps in hallway   4 laps in hallway     Crossovers            [place]            Walking  backwards              Tandem walking            Weaving in/out of cones              Picking up cones             Standing 360 degree turns            Ball toss            Standing ball circles     2 x 10 reps each direction  2 x 10 reps each direction     Figure 8s       3 x 3 reps   3 x 3 reps     Circles right/left     3 x 3 reps each direction  3 x 3 reps each direction     Walking with 360 degree turns            Spirals            Weight shifting:    Left & Right               Weight shifting: Forward & Backward                Static Standing Balance               Standing with feet apart        rockerboard and Blue foams and rockerboard eyes open with head turns and up/down and eyes  closed   rockerboard and Blue foams, and sanddune and rockerboard eyes open with head turns and up/down and eyes  closed     Standing with feet together               Standing with feet semitandem     Blue foams eyes open with head turns and up/down and eyes  Closed in each position  Blue foams eyes open and closed with head turns and up/down     Standing with feet tandem            Single leg stance            X1/X2 Viewing exercises        standing x1 viewing 30 sec x 4 each direction with checkerboard on blue foams       Hallpike-Mc[place] testing for BPPV (Benign Paroxysmal Positional Vertigo)               Rainey-Daroff exercises            Canalith Repositioning treatment/Epley Maneuver  for BPPV (Benign Paroxysmal Positional Vertigo)            Smart Equitest Training: See scanned report.                       [employer] Portal  Treatment/Session Summary:    · Response to Treatment:  Patient tolerated session well with no increase in dizziness. Dizziness from morement reduces quickly when stopping. Overall, patient is demonstrating improving balance and dizziness with daily activities. May reduce to 1x/week in the near future. .   · Communication/Consultation:  None today  · Equipment provided today:  None today  · Recommendations/Intent for next treatment session: Next visit will focus on continuing balance and habituation training. Rockerboard balance exercises.    Treatment Plan of Care Effective Dates: 3/6/2019 TO 6/4/2019   Total Treatment Billable Duration:  40 minutes  PT Patient Time In/Time Out  Time In: 1115  Time Out: [place] 3, PT    Future Appointments   Date Time Provider Enzo Pulido   4/4/2019  9:45 AM Maggie Rocha PT [facility] ARIELLE   4/9/2019 9:00 AM Winferd Burn, PT [facility] SFE   4/11/2019  9:45 AM Winferd Burn, PT [facility] SFE   4/20/2019  9:00 AM SFE JUANA BI ROOM 3 SFERMAM SFE   5/2/2019 10:50 AM SFE DEXA BI GE LUNAR DEXA SFERMAM SFE   6/18/2019 10:00 AM Natalie Emmanuel MD [facility] HTF   8/8/2019  1:00 PM Natalie Emmanuel MD [facility] HTF   4/6/2020 10:15 AM Alonso Epstein MD [place] [facility] [address]

## 2019-04-04 ENCOUNTER — HOSPITAL ENCOUNTER (OUTPATIENT)
Dept: PHYSICAL THERAPY | Age: 74
Discharge: HOME | End: 2019-04-04
Attending: FAMILY MEDICINE
Payer: MEDICARE

## 2019-04-04 PROCEDURE — 97112 NEUROMUSCULAR REEDUCATION: CPT

## 2019-04-04 NOTE — PROGRESS NOTES
Jourdan Reyes  : 1945  [place]  Secondary: Jt Epperson at [place] [id number] [facility], [address], [place] [address].  [phone]   [phone]         OUTPATIENT PHYSICAL THERAPY: Daily Treatment Note 2019  Pre-treatment Symptoms/Complaints:  Doing well. .   Pain: Initial: Pain Intensity 1: 0 0, dizziness 1/10 Post Session:  0/10  Dizziness 1/10   Medications Last Reviewed: 19  Updated Objective Findings:  None Today   TREATMENT:       NEUROMUSCULAR RE-EDUCATION: (40 minutes):  Exercise/activities per grid below to improve balance, coordination, kinesthetic sense, posture and proprioception. Required minimal verbal cues to promote static and dynamic balance in standing.   Balance/Vestibular Treatment:   Activity    Date  3/28/19 Date  4/3/19 Date  19   Activity/Exercise    Sets/reps/  equipment Sets/reps/  equipment Sets/reps/  equipment   Walking with head turns        6 laps in hallway   6 laps in hallway   6 laps in hallway   Walking with head up & down        6 laps in hallway   6 laps in hallway   6 laps in hallway   Walking with head diagonals        4 laps in hallway each direction  4 laps in hallway each direction   6 laps in hallway each direction   Step taps               Marching      4 laps in hallway   4 laps in hallway  4 laps in hallway   Sidestepping      4 laps in hallway   4 laps in hallway  4 laps in hallway   Crossovers            [place]            Walking  backwards              Tandem walking            Weaving in/out of cones              Picking up cones             Standing 360 degree turns            Ball toss            Standing ball circles     2 x 10 reps each direction  2 x 10 reps each direction  2 x 10 reps each direction   Figure 8s       3 x 3 reps   3 x 3 reps   3 x 3 reps   Circles right/left     3 x 3 reps each direction  3 x 3 reps each direction   3 x 3 reps each direction   Walking with 360 degree turns            Spirals         2 times each direction. Weight shifting:    Left & Right               Weight shifting: Forward & Backward                Static Standing Balance               Standing with feet apart        rockerboard and Blue foams and rockerboard eyes open with head turns and up/down and eyes  closed   rockerboard and Blue foams, and sanddune and rockerboard eyes open with head turns and up/down and eyes  closed   rockerboard and Blue foams, and sanddune and rockerboard eyes open with head turns and up/down and eyes  closed   Standing with feet together               Standing with feet semitandem     Blue foams eyes open with head turns and up/down and eyes  Closed in each position  Blue foams eyes open and closed with head turns and up/down  Blue foams eyes open and closed with head turns and up/down   Standing with feet tandem            Single leg stance            X1/X2 Viewing exercises        standing x1 viewing 30 sec x 4 each direction with checkerboard on blue foams       Hallpike-[place] testing for BPPV (Benign Paroxysmal Positional Vertigo)               Rainey-Daroff exercises            Canalith Repositioning treatment/Epley Maneuver  for BPPV (Benign Paroxysmal Positional Vertigo)            Smart Equitest Training: See scanned report.                       [employer] Portal  Treatment/Session Summary:    · Response to Treatment:  Patient did well with exercises. She reported little dizziness with movements, and dizziness resolved quickly. Balance on unstable surfaces is improving. .   · Communication/Consultation:  None today  · Equipment provided today:  None today  · Recommendations/Intent for next treatment session: Next visit will focus on continuing balance and habituation training. Rockerboard balance exercises.    Treatment Plan of Care Effective Dates: 3/6/2019 TO 6/4/2019   Total Treatment Billable Duration:  40 minutes  PT Patient Time In/Time Out  Time In: 0945  Time Out: INGRIS Berg    Future Appointments   Date Time Provider Enzo Pulido   4/9/2019  9:00 AM Mc Eid, PT [facility] SFE   4/11/2019  9:45 AM Mc Eid, PT [facility]PT SFE   4/20/2019  9:00 AM SFE JUANA BI ROOM 3 [facility]ERMAM SFE   5/2/2019 10:50 AM [facility]   6/18/2019 10:00 AM Shayna Woodard MD [facility] HTF HTF   8/8/2019  1:00 PM Shayna Woodard MD [facility] HTF   4/6/2020 10:15 AM Fernanda Gonsalez MD [place] [facility] [address] [place] [address]

## 2019-04-09 ENCOUNTER — HOSPITAL ENCOUNTER (OUTPATIENT)
Dept: PHYSICAL THERAPY | Age: 74
Discharge: HOME | End: 2019-04-09
Attending: FAMILY MEDICINE
Payer: MEDICARE

## 2019-04-09 PROCEDURE — 97112 NEUROMUSCULAR REEDUCATION: CPT

## 2019-04-09 NOTE — PROGRESS NOTES
Enzo Diaz  : 1945  [place]  Secondary: Jt Epperson at [place] [id number] [facility], [address], [place] [address].  [phone]   [phone]         OUTPATIENT PHYSICAL THERAPY: Daily Treatment Note 2019  Pre-treatment Symptoms/Complaints:  Doing well. .Dizziness better today than yesterday. Pain: Initial: Pain Intensity 1: 0 0, dizziness 1/10 Post Session:  0/10  Dizziness 1/10   Medications Last Reviewed: 19  Updated Objective Findings:  None Today   TREATMENT:       NEUROMUSCULAR RE-EDUCATION: (40 minutes):  Exercise/activities per grid below to improve balance, coordination, kinesthetic sense, posture and proprioception. Required minimal verbal cues to promote static and dynamic balance in standing. Balance/Vestibular Treatment:   Activity    Date  19   Activity/Exercise    Sets/reps/  equipment   Walking with head turns        6 laps in hallway   Walking with head up & down        6 laps in hallway   Walking with head diagonals         6 laps in hallway each direction   Step taps           Marching     4 laps in hallway   Sidestepping     4 laps in hallway   Crossovers        [place]        Walking  backwards          Tandem walking        Weaving in/out of cones          Picking up cones         Standing 360 degree turns        Ball toss        Standing ball circles     2 x 10 reps each direction   Figure 8s       3 x 3 reps   Circles right/left      3 x 3 reps each direction   Walking with 360 degree turns        Spirals     2 times each direction. Weight shifting:    Left & Right           Weight shifting:   Forward & Backward            Static Standing Balance           Standing with feet apart         rockerboard and Blue foams, and sanddune and rockerboard eyes open with head turns and up/down and eyes  closed   Standing with feet together           Standing with feet semitandem     Blue foams eyes open and closed with head turns and up/down   Standing with feet tandem        Single leg stance        X1/X2 Viewing exercises           Hallpike-Sonia testing for BPPV (Benign Paroxysmal Positional Vertigo)           Rainey-Daroff exercises        Canalith Repositioning treatment/Epley Maneuver  for BPPV (Benign Paroxysmal Positional Vertigo)        Smart [employer] Training: See scanned report.                   [place]  Treatment/Session Summary:    · Response to Treatment:  Patient tolerated session well. She experiences small increases in dizziness, particularly with head turns, but resolves quickly. Patient's balance with eyes closed on unstable surfaces is improving. .   · Communication/Consultation:  None today  · Equipment provided today:  None today  · Recommendations/Intent for next treatment session: Next visit will focus on continuing balance and habituation training. Rockerboard balance exercises.    Treatment Plan of Care Effective Dates: 3/6/2019 TO 6/4/2019   Total Treatment Billable Duration:  40 minutes  PT Patient Time In/Time Out  Time In: 0900  Time Out: 82 Beti Faustin, [facility]    Future Appointments   Date Time Provider Enzo Pulido   4/9/2019 10:30 AM Marquez Rodriguez NP [facility] HTF HTF   4/11/2019  9:45 AM Winferd Burn, PT SFEORPT SFE   4/16/2019  9:00 AM Winferd Burn, PT SFEORPT SFE   4/20/2019  9:00 AM SFE JUANA BI ROOM 3 SFERMAM SFE   4/23/2019  9:00 AM Winferd Burn, PT SFEORPT SFE   4/30/2019  9:00 AM Winferd Burn, PT SFEORPT SFE   5/2/2019 10:50 AM SFE DEXA BI GE LUNAR DEXA SFERMAM SFE   6/18/2019 10:00 AM Natalie Emmanuel MD [facility] HTF HTF   8/8/2019  1:00 PM Natalie Emmanuel MD [facility] HTF   4/6/2020 10:15 AM Alonso Epstein MD [place] [facility] [address]

## 2019-04-10 ENCOUNTER — HOSPITAL ENCOUNTER (OUTPATIENT)
Dept: GENERAL RADIOLOGY | Age: 74
Discharge: HOME | End: 2019-04-10
Attending: FAMILY MEDICINE
Payer: MEDICARE

## 2019-04-10 DIAGNOSIS — G89.29 CHRONIC LEFT SHOULDER PAIN: ICD-10-CM

## 2019-04-10 DIAGNOSIS — M25.512 CHRONIC LEFT SHOULDER PAIN: ICD-10-CM

## 2019-04-10 PROCEDURE — 73030 X-RAY EXAM OF SHOULDER: CPT

## 2019-04-10 PROCEDURE — 72040 X-RAY EXAM NECK SPINE 2-3 VW: CPT

## 2019-04-11 ENCOUNTER — HOSPITAL ENCOUNTER (OUTPATIENT)
Dept: PHYSICAL THERAPY | Age: 74
Discharge: HOME | End: 2019-04-11
Attending: FAMILY MEDICINE
Payer: MEDICARE

## 2019-04-11 PROCEDURE — 97112 NEUROMUSCULAR REEDUCATION: CPT

## 2019-04-11 NOTE — PROGRESS NOTES
Rafael Franklin  : 1945  [place]  Secondary: Jt Epperson at [address], [address], [place] [address].  [phone]   [phone]         OUTPATIENT PHYSICAL THERAPY: Daily Treatment Note 2019  Pre-treatment Symptoms/Complaints:  A little dizzy  Pain: Initial: Pain Intensity 1: 0 0, dizziness 1/10 Post Session:  0/10  Dizziness 1/10   Medications Last Reviewed: 19  Updated Objective Findings:  None Today   TREATMENT:       NEUROMUSCULAR RE-EDUCATION: (40 minutes):  Exercise/activities per grid below to improve balance, coordination, kinesthetic sense, posture and proprioception. Required minimal verbal cues to promote static and dynamic balance in standing. Balance/Vestibular Treatment:   Activity    Date  19   Activity/Exercise    Sets/reps/  equipment   Walking with head turns        6 laps in hallway   Walking with head up & down        6 laps in hallway   Walking with head diagonals         6 laps in hallway each direction   Step taps           Marching     4 laps in hallway   Sidestepping     4 laps in hallway   Crossovers        [place]        Walking  backwards          Tandem walking        Weaving in/out of cones          Picking up cones         Standing 360 degree turns     3 reps x 2 in each direction   Ball toss        Standing ball circles     2 x 10 reps each direction   Figure 8s       3 x 3 reps   Circles right/left      3 x 3 reps each direction   Walking with 360 degree turns        Spirals     2 times each direction. Weight shifting:    Left & Right           Weight shifting:   Forward & Backward            Static Standing Balance           Standing with feet apart         rockerboard and Blue foams, and sanddune and rockerboard eyes open with head turns and up/down and eyes  closed   Standing with feet together           Standing with feet semitandem     Blue foams eyes open and closed with head turns and up/down   Standing with feet tandem        Single leg stance        X1/X2 Viewing exercises           Hallpike-Sonia testing for BPPV (Benign Paroxysmal Positional Vertigo)           Rainey-Daroff exercises        Canalith Repositioning treatment/Epley Maneuver  for BPPV (Benign Paroxysmal Positional Vertigo)        Smart [employer] Training: See scanned report.                   [employer]  Treatment/Session Summary:    · Response to Treatment:  Patient tolerated exercises well. Dizziness resolves quickly when stopping movement. Balance on unstable surfaces is improving. .   · Communication/Consultation:  None today  · Equipment provided today:  None today  · Recommendations/Intent for next treatment session: Next visit will focus on continuing balance and habituation training. Rockerboard balance exercises.    Treatment Plan of Care Effective Dates: 3/6/2019 TO 6/4/2019   Total Treatment Billable Duration:  40 minutes  PT Patient Time In/Time Out  Time In: 0945  Time Out: Jerry [id number], PT    Future Appointments   Date Time Provider Enzo Pulido   4/16/2019  9:00 AM Rambo Navarrete PT SFEORPT SFE   4/20/2019  9:00 AM SFE JUANA BI ROOM 3 SFERMAM SFE   4/23/2019  9:00 AM Rambo Navarrete PT SFEORPT SFE   4/30/2019  9:00 AM Rambo Navarrete PT SFEORPT SFE   5/2/2019 10:50 AM SFE DEXA BI GE LUNAR DEXA SFERMAM SFE   6/18/2019 10:00 AM Lucille Dimas MD [facility] HTF   8/8/2019  1:00 PM [facility]   4/6/2020 10:15 AM Aditya Beatty MD [facility] [address] [place] [address]

## 2019-04-16 ENCOUNTER — HOSPITAL ENCOUNTER (OUTPATIENT)
Dept: PHYSICAL THERAPY | Age: 74
Discharge: HOME | End: 2019-04-16
Attending: FAMILY MEDICINE
Payer: MEDICARE

## 2019-04-16 PROCEDURE — 97112 NEUROMUSCULAR REEDUCATION: CPT

## 2019-04-16 NOTE — PROGRESS NOTES
Mckenzie Suarez  : 1945  [place]  Secondary: Jt Epperson at [place] [id number] [facility], [address], [address]  [phone]   [phone]         OUTPATIENT PHYSICAL THERAPY: Daily Treatment Note 2019  Pre-treatment Symptoms/Complaints: Doing well. Rain made me dizzy. Pain: Initial: Pain Intensity 1: 0 0, dizziness 1/10 Post Session:  0/10  Dizziness 1/10   Medications Last Reviewed: 19  Updated Objective Findings:  None Today   TREATMENT:       NEUROMUSCULAR RE-EDUCATION: (40 minutes):  Exercise/activities per grid below to improve balance, coordination, kinesthetic sense, posture and proprioception. Required minimal verbal cues to promote static and dynamic balance in standing. Balance/Vestibular Treatment:   Activity    Date  19   Activity/Exercise    Sets/reps/  equipment   Walking with head turns        6 laps in hallway   Walking with head up & down        6 laps in hallway   Walking with head diagonals         6 laps in hallway each direction   Step taps           Marching     4 laps in hallway   Sidestepping     4 laps in hallway   Crossovers        [place]        Walking  backwards          Tandem walking        Weaving in/out of cones          Picking up cones         Standing 360 degree turns     3 reps x 2 in each direction   Ball toss        Standing ball circles     2 x 10 reps each direction   Figure 8s       3 x 3 reps   Circles right/left      3 x 3 reps each direction   Walking with 360 degree turns     4 laps of 3 circles in each direction (to L was less dizzy)   Spirals     2 times each direction. Weight shifting:    Left & Right           Weight shifting:   Forward & Backward            Static Standing Balance           Standing with feet apart         rockerboard and Blue foams, and sanddune and rockerboard eyes open with head turns and up/down and eyes  closed   Standing with feet together           Standing with feet semitandem     Blue foams eyes open and closed with head turns and up/down   Standing with feet tandem        Single leg stance        X1/X2 Viewing exercises           Hallpike-Sonia testing for BPPV (Benign Paroxysmal Positional Vertigo)           Rainey-Daroff exercises        Canalith Repositioning treatment/Epley Maneuver  for BPPV (Benign Paroxysmal Positional Vertigo)        Smart [employer] Training: See scanned report.                   [employer] [facility]  Treatment/Session Summary:    · Response to Treatment:  Patient tolerated exercises well. she was able to add walking with 360 degree turns with mild dizziness and she may do them at home. 2 more appointments then may take a break with nargis working on HEP. Tara Solorzano · Communication/Consultation:  None today  · Equipment provided today:  None today  · Recommendations/Intent for next treatment session: Next visit will focus on continuing balance and habituation training. Rockerboard balance exercises.    Treatment Plan of Care Effective Dates: 3/6/2019 TO 6/4/2019   Total Treatment Billable Duration:  40  minutes  PT Patient Time In/Time Out  Time In: 8608  Time Out: 0945  Oliverio Burns PT    Future Appointments   Date Time Provider Enzo Pulido   4/20/2019  9:00 AM SFE JUANA BI ROOM 3 SFERMAM SFE   4/23/2019  9:00 AM Yohana Shape, PT SFEORPT SFE   4/30/2019  9:00 AM Yohana Shape, PT SFEORPT SFE   5/2/2019 10:50 AM SFE DEXA BI GE LUNAR DEXA SFERMAM SFE   6/18/2019 10:00 AM Dale Mackenzie MD [facility] HTF   8/8/2019  1:00 PM Dale Mackenzie MD [facility] HTF   4/6/2020 10:15 AM Vincenzo Willis MD [facility] [address] [place] [address]

## 2019-04-20 ENCOUNTER — HOSPITAL ENCOUNTER (OUTPATIENT)
Dept: MAMMOGRAPHY | Age: 74
Discharge: HOME | End: 2019-04-20
Attending: OBSTETRICS & GYNECOLOGY
Payer: MEDICARE

## 2019-04-20 DIAGNOSIS — Z12.39 SCREENING BREAST EXAMINATION: ICD-10-CM

## 2019-04-20 PROCEDURE — 77063 BREAST TOMOSYNTHESIS BI: CPT

## 2019-04-23 ENCOUNTER — HOSPITAL ENCOUNTER (OUTPATIENT)
Dept: PHYSICAL THERAPY | Age: 74
Discharge: HOME | End: 2019-04-23
Attending: FAMILY MEDICINE
Payer: MEDICARE

## 2019-04-23 PROCEDURE — 97112 NEUROMUSCULAR REEDUCATION: CPT

## 2019-04-23 NOTE — PROGRESS NOTES
Raul Sky  : 1945  [place]  Secondary: Jt Epperson at [place] [address] [facility], [address], [address] [facility]  [phone]   [phone]         OUTPATIENT PHYSICAL THERAPY: Daily Treatment Note 2019  Pre-treatment Symptoms/Complaints: Doing fine. No complaints. Pain: Initial: Pain Intensity 1: 0 0, dizziness 0/10 Post Session:  0/10  Dizziness 0/10   Medications Last Reviewed: 19  Updated Objective Findings:  None Today   TREATMENT:       NEUROMUSCULAR RE-EDUCATION: (45 minutes):  Exercise/activities per grid below to improve balance, coordination, kinesthetic sense, posture and proprioception. Required minimal verbal cues to promote static and dynamic balance in standing. Balance/Vestibular Treatment:   Activity    Date  19   Activity/Exercise    Sets/reps/  equipment   Walking with head turns        6 laps in hallway   Walking with head up & down        6 laps in hallway   Walking with head diagonals         6 laps in hallway each direction   Step taps           Marching     4 laps in hallway   Sidestepping     4 laps in hallway   Crossovers        [place]        Walking  backwards          Tandem walking        Weaving in/out of cones          Picking up cones         Standing 360 degree turns     3 reps x 2 in each direction   Ball toss        Standing ball circles     2 x 10 reps each direction   Figure 8s       3 x 3 reps   Circles right/left      3 x 3 reps each direction   Walking with 360 degree turns     4 laps of 3 circles in each direction (to L was less dizzy)   Spirals     3 times each direction. Weight shifting:    Left & Right           Weight shifting:   Forward & Backward            Static Standing Balance           Standing with feet apart         rockerboard and Blue foams, and sanddune and rockerboard eyes open with head turns and up/down and eyes  closed   Standing with feet together           Standing with feet semitandem     Blue foams eyes open and closed with head turns and up/down   Standing with feet tandem        Single leg stance        X1/X2 Viewing exercises           Hallpike-[place] testing for BPPV (Benign Paroxysmal Positional Vertigo)           Rainey-Daroff exercises        Canalith Repositioning treatment/Epley Maneuver  for BPPV (Benign Paroxysmal Positional Vertigo)        Smart [employer] Training: See scanned report.                   [facility] Portal  Treatment/Session Summary:    · Response to Treatment:  Patient did well with exercises. She still experiences some dizziness with turning exercises, but resolves quickly. Balance has improved as well. REASSESS NEXT SESSION. .   · Communication/Consultation:  None today  · Equipment provided today:  None today  · Recommendations/Intent for next treatment session: Next visit will focus on continuing balance and habituation training. Rockerboard balance exercises.    Treatment Plan of Care Effective Dates: 3/6/2019 TO 6/4/2019   Total Treatment Billable Duration:  45  minutes  PT Patient Time In/Time Out  Time In: 0900  Time Out: [address] Micheline Pierre, PT    Future Appointments   Date Time Provider Enzo Pulido   4/30/2019  9:00 AM Peña Stevens, [facility] SFE   5/2/2019 10:50 AM SFE DEXA BI  LUNAR DEXA [facility] SFE   6/18/2019 10:00 AM Ella Reyna MD [facility] HTF   8/8/2019  1:00 PM Ella Reyna MD [facility] HTF   4/6/2020 10:15 AM Divya Packer MD [place] [facility] [address]

## 2019-04-30 ENCOUNTER — HOSPITAL ENCOUNTER (OUTPATIENT)
Dept: PHYSICAL THERAPY | Age: 74
Discharge: HOME | End: 2019-04-30
Attending: FAMILY MEDICINE
Payer: MEDICARE

## 2019-04-30 PROCEDURE — 97112 NEUROMUSCULAR REEDUCATION: CPT

## 2019-04-30 NOTE — PROGRESS NOTES
Ashwini Freitas  : 1945  [place]  Secondary: Jt Epperson at [place] [address] [facility], [address], [place] [address].  [phone]   [phone]         OUTPATIENT PHYSICAL THERAPY: Daily Treatment Note 2019  Pre-treatment Symptoms/Complaints: Doing fine. No complaints. Pain: Initial: Pain Intensity 1: 0 0, dizziness 0/10 Post Session:  0/10  Dizziness 0/10   Medications Last Reviewed: 19  Updated Objective Findings:  None Today   TREATMENT:       NEUROMUSCULAR RE-EDUCATION: (44 minutes):  Exercise/activities per grid below to improve balance, coordination, kinesthetic sense, posture and proprioception. Required minimal verbal cues to promote static and dynamic balance in standing. Balance/Vestibular Treatment:   Activity    Date  19   Activity/Exercise    Sets/reps/  equipment   Walking with head turns        6 laps in hallway   Walking with head up & down        6 laps in hallway   Walking with head diagonals         6 laps in hallway each direction   Step taps           Marching     4 laps in hallway   Sidestepping     4 laps in hallway   Crossovers        [place]        Walking  backwards          Tandem walking        Weaving in/out of cones          Picking up cones         Standing 360 degree turns     3 reps x 2 in each direction   Ball toss        Standing ball circles     2 x 10 reps each direction   Figure 8s       3 x 3 reps   Circles right/left      3 x 3 reps each direction   Walking with 360 degree turns     4 laps of 3 circles in each direction (to L was less dizzy)   Spirals     3 times each direction. Weight shifting:    Left & Right           Weight shifting:   Forward & Backward            Static Standing Balance           Standing with feet apart         rockerboard and Blue foams, and sanddune and rockerboard eyes open with head turns and up/down and eyes  closed   Standing with feet together           Standing with feet semitandem     Blue foams eyes open and closed with head turns and up/down   Standing with feet tandem        Single leg stance        X1/X2 Viewing exercises           Hallpike-[place] testing for BPPV (Benign Paroxysmal Positional Vertigo)           Rainey-Daroff exercises        Canalith Repositioning treatment/Epley Maneuver  for BPPV (Benign Paroxysmal Positional Vertigo)        [employer] Training: See scanned report.                   [employer] Portal  Treatment/Session Summary:    · Response to Treatment:  Patient has met all goals. Discharge today.  .   · Communication/Consultation:  None today  · Equipment provided today:  None today  · Recommendations/Intent for next treatment session: Discharge today  Treatment Plan of Care Effective Dates: 3/6/2019 TO 4/30/2019  Total Treatment Billable Duration:  44  minutes  PT Patient Time In/Time Out  Time In: 0900  Time Out: [facility] [address]    Future Appointments   Date Time Provider Enzo Pulido   5/2/2019 10:50 AM SFE DEXA BI GE LUNAR DEXA SFERMAM SFE   5/23/2019  9:30 AM Steven Wilson MD [facility] HTF HTF   6/18/2019 10:00 AM Steven Wilson MD [facility] HTF HTF   8/8/2019  1:00 PM Steven Wilson MD [facility] HTF   4/6/2020 10:15 AM Maddie Diaz MD [place] [facility] [address]

## 2019-04-30 NOTE — THERAPY DISCHARGE
Hina Medico  : 1945  [place]  Secondary: Jt Huerta 75 at [address], [address] [place] [address],[address], [place] [address].  [phone]   [phone]           OUTPATIENT PHYSICAL THERAPY:Discharge Summary 2019   ICD-10: Treatment Diagnosis: Other abnormalities of gait and mobility R26.89; Dizziness and giddiness R42  Precautions/Allergies:   Codeine; Morphine; Amoxicillin-pot clavulanate; Ciprofloxacin; Demerol [meperidine]; Gatifloxacin; Gluten protein; Sulfamethoxazole-trimethoprim; Telithromycin; Bactrim [sulfamethoprim ds]; Cephalosporins; Gluten; Omnicef [cefdinir]; Phenergan [promethazine]; and Synthroid [levothyroxine]   MD Orders: eval and treat MEDICAL/REFERRING DIAGNOSIS:  Dizziness [R42]   DATE OF ONSET: 7 weeks ago. REFERRING PHYSICIAN: Ramon Carver MD  RETURN PHYSICIAN APPOINTMENT:      INITIAL ASSESSMENT:  Ms. Josiephine Meigs presents with dizziness and imbalance, improving over the last several weeks. Patient demonstrates imbalance with testing and reports that she is not functionally back to her normal daily activities at this point. Patient would benefit from PT to address these problems to improve patient's independence and safety with mobility and daily activities. Thank you. PROGRESS NOTE 4/3/19: Ms. Josiephine Meigs has attended 8 PT visits from 3/6/19 to 4/3/19 for dizziness and imbalance, improving over the last several weeks. Patient is demonstrating improving balance and reduced dizziness with exercises. Patient would benefit from continuing  PT to address these problems to improve patient's independence and safety with mobility and daily activities. Thank you    DISCHARGENOTE 19: Ms. Josiephine Meigs has attended 14 PT visits from 3/6/19 to 19 for dizziness and imbalance, improving over the last several weeks.  Patient demonstrates improved balance and little to no dizziness with movement and position changes. Patient has met goals. Patient was instructed to continue with HEP. We will discharge patient at this time. . Thank you      PROBLEM LIST (Impacting functional limitations):  1. Decreased Transfer Abilities  2. Decreased Balance  3. Decreased Activity Tolerance  4. Decreased [place] with Home Exercise Program INTERVENTIONS PLANNED: (Treatment may consist of any combination of the following)  1. Balance Exercise  2. Home Exercise Program (HEP)  3. Neuromuscular Re-education/Strengthening  4. Therapeutic Activites  5. Therapeutic Exercise/Strengthening   6. Vestibular and habituation training   TREATMENT PLAN:  Effective Dates: 3/6/2019 TO 4/30/2019 . Frequency/Duration: Discharge  GOALS: (Goals have been discussed and agreed upon with patient.)  Short-Term Functional Goals: Time Frame: 2-4 weeks  1. Patient will demonstrate independence and compliance with home exercise program to improve balance and dizziness for daily activities. MET  2. Patient will increase her score on the dynamic gait index to greater than or equal to 19/24 indicating improved balance and safety for community ambulation. MET  3. Discharge Goals: Time Frame: 8-12 weeks  1. Patient will report decreased dizziness to less than or equal to 1/10 with daily activities to improve safety and quality of life. MET  2. Patient will increase her score on the dynamic gait index to greater than or equal to 23/24 indicating improved balance and safety for community ambulation. MET  3. Patient will increase her score on the Alejandra Balance Scale to greater than or equal to 52/56 indicating improved safety and decreased fall risk for daily activities. MET  4. Patient will ambulate with least assistive device over level and unlevel surfaces without evidence of imbalance to improve safety for daily activities. MET  5.  Patient will demonstrate improved score on the Dizziness Handicap Inventory to less than or equal to 15 indicating decreased perceived dizziness and imbalance with daily activities. MET  Rehabilitation Potential For Stated Goals: Good              The information in this section was collected on 3/6/19 (except where otherwise noted). HISTORY:   History of Present Injury/Illness (Reason for Referral):  7 weeks ago, went to hospital with vertigo. Checked heart, okay but very dehydrated. Just finished Home health PT for 5 weeks for dizziness and balance. Moving around stirs up dizziness. Being still is fine. No falls. Ears feel plugged but no recent change in hearing. No changes in vision. No tinnitus. Tend to drift to the R when walking or moving too quick. Gets swimmy headed if move head too quick but goes away. Sleeping well. Not moving at pace like usual. Usually watches two grandchildren, driving, cleaning house. Wants to get back to that. Past Medical History/Comorbidities:   Ms. Benny Michelle  has a past medical history of Acid reflux, Arrhythmia (2013), Arthritis, Asthma, Bronchitis, CAD (coronary artery disease), Essential hypertension (6/13/2016), GERD (gastroesophageal reflux disease), Gluten intolerance, H/O echocardiogram (2013), H/O exercise stress test (2013), Headache, Hearing loss, Hiatal hernia, Hypercholesterolemia, Hypothyroidism, IBS (irritable bowel syndrome), Memory difficulties (5/24/2018), Menopause, Osteopenia (10/2/2015), Pap smear for cervical cancer screening (2012), Plantar fasciitis, and Supraventricular premature beats (6/23/2015). Ms. Benny Michelle  has a past surgical history that includes hx cholecystectomy (1977); hx orthopaedic (Right); pr lap,tubal cautery; hx open cholecystectomy (1977); hx tubal ligation (Bilateral, 1981); hx gyn (2004); hx cataract removal (Bilateral, 2014); hx tonsillectomy (1950); hx dilation and curettage (1994; 2003); hx wisdom teeth extraction (1964); hx open reduction internal fixation (Right, 1995); hx cataract removal (06/30/2014); hx colonoscopy (2014); and hx breast biopsy.   Social History/Living Environment:     lives with daughter, son in law and 2 small children; retired teacher  Prior Level of Function/Work/Activity:  Independent, driving, taking care of grandchildren  Dominant Side:         RIGHT  Previous Treatment Approaches:          HHPT  Personal Factors:          Sex:  female        Age:  76 y.o. Ambulatory/Rehab Services H2 Model Falls Risk Assessment    Risk Factors:       (1)  Dizziness/Vertigo Ability to Rise from Chair:       (0)  Ability to rise in a single movement    Falls Prevention Plan:       No modifications necessary   Total: (5 or greater = High Risk): 1    ©2010 [facility] of [employer]. All Rights Reserved. [employer] Patent #0,[id number]. Federal Law prohibits the replication, distribution or use without written permission from [facility] [employer]     Current Medications:       Current Outpatient Medications:     atorvastatin (LIPITOR) 10 mg tablet, TAKE 1 TABLET BY MOUTH DAILY, Disp: 90 Tab, Rfl: 0    lisinopril (PRINIVIL, ZESTRIL) 10 mg tablet, Take 1 Tab by mouth daily. , Disp: 30 Tab, Rfl: 1    turmeric-herbal complex no. 278 150 mg cap, Take 1 Cap by mouth., Disp: , Rfl:     azelastine (ASTELIN) 137 mcg (0.1 %) nasal spray, 1 Spray by Nasal route., Disp: , Rfl:     famotidine (PEPCID) 20 mg tablet, , Disp: , Rfl: 3    GLUCOSAMINE SULFATE PO, Take 1 Tab by mouth., Disp: , Rfl:     meloxicam (MOBIC) 7.5 mg tablet, Take 1 Tab by mouth daily. , Disp: 30 Tab, Rfl: 2    alendronate (FOSAMAX) 70 mg tablet, Take 1 Tab by mouth every seven (7) days. , Disp: 4 Tab, Rfl: 12    levothyroxine (SYNTHROID) 100 mcg tablet, Take 1 Tab by mouth Daily (before breakfast). , Disp: 90 Tab, Rfl: 3    nitroglycerin (NITROSTAT) 0.4 mg SL tablet, PLACE 1 TABLET UNDER THE TONGUE EVERY FIVE MINUTES AS NEEDED FOR CHEST PAIN, Disp: , Rfl:     TURMERIC ROOT EXTRACT PO, Take  by mouth., Disp: , Rfl:     cholecalciferol (VITAMIN D3) 1,000 unit cap, Take  by mouth daily. , Disp: , Rfl:     ketotifen (ZADITOR) 0.025 % (0.035 %) ophthalmic solution, Administer 1 Drop to both eyes two (2) times a day., Disp: , Rfl:     fluticasone-vilanterol (BREO ELLIPTA) 100-25 mcg/dose inhaler, Take 1 Puff by inhalation daily. , Disp: , Rfl:     dicyclomine (BENTYL) 10 mg capsule, Take 1 Cap by mouth three (3) times daily as needed. Per GI, Disp: , Rfl: 5    B INFANTIS/B ANI/B IVONNE/B BIFID (PROBIOTIC 4X PO), Take  by mouth daily. , Disp: , Rfl:     Cetirizine (ZYRTEC) 10 mg cap, Take  by mouth., Disp: , Rfl:     montelukast (SINGULAIR) 10 mg tablet, Take 10 mg by mouth daily. , Disp: , Rfl:    Date Last Reviewed:  4/30/19   Number of Personal Factors/Comorbidities that affect the Plan of Care: 1-2: MODERATE COMPLEXITY   EXAMINATION:   Observation/Orthostatic Postural Assessment:          Mild forward head posture  Strength:          4+/5 B LE  Functional Mobility:         Gait/Ambulation:  Patient ambulates with no AD with a normal gait pattern but demonstrates path deviations, particularly to the R. Transfers:  Independent, no UE assist for sit to stand        Bed Mobility:  independent        Stairs:  With rail  Sensation:         intact  Postural Control & Balance:  · Alejandra Balance Scale:  47/56.   (A score less than 45/56 indicates high risk of falls)   47/56 at evaluation  · Dynamic Gait Index:  17/24.    (A score less than or equal to19/24 is abnormal and predictive of falls)   17/24 at evaluation  · [employer] Sensory Organization Test: NT      Oculomotor Exam:  · Eye Range of Motion:  full range of motion  · Cervical Range of Motion:   diminished range of motion  · Spontaneous nystagmus:  NO  · Gaze holding nystagmus:  NO  · Skew Deviation:  none  · Smooth Pursuit:      [x]Smooth Eye Movements    []Delayed Eye Movements     []Within Normal Limits     [x]Other(comment): no dizziness  · Voluntary Saccades:      [x]Smooth Eye Movements    []Delayed Eye Movements     []Within Normal Limits [x]Other(comment): no dizziness  · Optokinetic cloth: NT  Vestibular Ocular Reflex Testing:  · Dynamic Visual Acuity Test:  NT  · Head Thrust Test: Negative to the right. Negative to the left. · VOR Cancellation: WNL  Position Tests:  · Hallpike-Sonia testing: N/A       Body Structures Involved:  1. Nerves  2. Eyes and Ears  3. Muscles Body Functions Affected:  1. Sensory/Pain  2. Neuromusculoskeletal  3. Movement Related Activities and Participation Affected:  1. General Tasks and Demands  2. Mobility  3. Domestic Life  4. Community, Social and [place]   Number of elements (examined above) that affect the Plan of Care: 3: MODERATE COMPLEXITY   CLINICAL PRESENTATION:   Presentation: Evolving clinical presentation with changing clinical characteristics: MODERATE COMPLEXITY   CLINICAL DECISION MAKING:   Outcome Measure: Tool Used: Alejandra Balance Scale  Score:  Initial: 47/56 Most Recent:54/56 (Date: 4/30/19 )   Interpretation of Score: Each section is scored on a 0-4 scale, 0 representing the patients inability to perform the task and 4 representing independence. The scores of each section are added together for a total score of 56. The higher the patients score, the more independent the patient is. Any score below 45 indicates increased risk for falls. Tool Used: Dynamic Gait Index  Score:  Initial: 17/24 Most Recent: 23/24 (Date:4/30/19)   Interpretation of Score: Each section is scored on a 0-3 scale, 0 representing the patients inability to perform the task and 3 representing independence. The scores of each section are added together for a total score of 24. Any score below 19 indicates increased risk for falls. Tool Used: Dizziness Handicap Index  Score:  Initial: 46  Most Recent: 0 (Date: 4/30/19)   Interpretation of Score: To each item, the following scores may be assigned: No = 0, Sometimes = 2, Yes = 4.   Scores greater than 10 points should be referred to balance specialists for further evaluation. Medical Necessity:   · Patient is expected to demonstrate progress in strength, balance and functional technique to improve safety during daily activities. Reason for Services/Other Comments:  · Patient continues to demonstrate capacity to improve balance, dizziness which will increase independence and increase safety.    Use of outcome tool(s) and clinical judgement create a POC that gives a: Questionable prediction of patient's progress: MODERATE COMPLEXITY

## 2019-05-02 ENCOUNTER — HOSPITAL ENCOUNTER (OUTPATIENT)
Dept: MAMMOGRAPHY | Age: 74
Discharge: HOME | End: 2019-05-02
Attending: OBSTETRICS & GYNECOLOGY
Payer: MEDICARE

## 2019-05-02 DIAGNOSIS — M81.0 AGE-RELATED OSTEOPOROSIS WITHOUT CURRENT PATHOLOGICAL FRACTURE: ICD-10-CM

## 2019-05-02 PROCEDURE — 77080 DXA BONE DENSITY AXIAL: CPT

## 2019-06-03 ENCOUNTER — APPOINTMENT (OUTPATIENT)
Dept: INFUSION THERAPY | Age: 74
End: 2019-06-03

## 2019-06-20 ENCOUNTER — HOSPITAL ENCOUNTER (OUTPATIENT)
Dept: PHYSICAL THERAPY | Age: 74
Discharge: HOME | End: 2019-06-20
Attending: FAMILY MEDICINE
Payer: MEDICARE

## 2019-06-20 DIAGNOSIS — R26.9 NEUROLOGIC GAIT DYSFUNCTION: ICD-10-CM

## 2019-06-20 PROCEDURE — 97110 THERAPEUTIC EXERCISES: CPT

## 2019-06-20 PROCEDURE — 97161 PT EVAL LOW COMPLEX 20 MIN: CPT

## 2019-06-20 NOTE — THERAPY EVALUATION
Schuyler Bence  : 1945  [place]  Secondary: Jt Epperson at [place] [id number] [facility], [address] [place] [address],[address], [facility] [address].  [phone]   [phone]           OUTPATIENT PHYSICAL THERAPY:Initial Assessment 2019   ICD-10: Treatment Diagnosis: Other abnormalities of gait and mobility R26.89; Dizziness and giddiness R42; muscle weakness (generalized) M62.81  Precautions/Allergies:   Codeine; Morphine; Amoxicillin-pot clavulanate; Ciprofloxacin; Demerol [meperidine]; Gatifloxacin; Gluten protein; Sulfamethoxazole-trimethoprim; Telithromycin; Bactrim [sulfamethoprim ds]; Cephalosporins; Gluten; Omnicef [cefdinir]; Phenergan [promethazine]; and Synthroid [levothyroxine]   TREATMENT PLAN:  Effective Dates: 2019 TO 2019 (90 days). 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 Ortiz First presents with weakness, imbalance, and mild dizziness. Patient was educated in home exercise program today. . Patient would benefit from PT to address these problems to improve patient's independence and safety with mobility and daily activities. Thank you. PROBLEM LIST (Impacting functional limitations):  1. Decreased Strength  2. Decreased ADL/Functional Activities  3. Decreased Transfer Abilities  4. Decreased Balance  5. Decreased Activity Tolerance  6. Decreased [place] with Home Exercise Program INTERVENTIONS PLANNED: (Treatment may consist of any combination of the following)  1. Balance Exercise  2. Gait Training  3. Home Exercise Program (HEP)  4. Neuromuscular Re-education/Strengthening  5. Therapeutic Activites  6.  Therapeutic Exercise/Strengthening     GOALS: (Goals have been discussed and agreed upon with patient.)  Short-Term Functional Goals: Time Frame: 2-4 weeks  1. Patient will demonstrate independence and compliance with home exercise program to improve balance and strength for daily activities. 2. Patient will increase her score on the Alejandra Balance Scale to greater than or equal to 53/56 indicating improved safety and decreased fall risk for daily activities. 3.   Discharge Goals: Time Frame: 8-12 weeks  1. Patient will increase her score on the Alejandra Balance Scale to greater than or equal to 55/56 indicating improved safety and decreased fall risk for daily activities. 2. Patient will increase her score on the dynamic gait index to greater than or equal to 23/24 indicating improved balance and safety for community ambulation. 3. Patient will ambulate with least assistive device over level and unlevel surfaces without evidence of imbalance to improve safety for daily activities. 4. Patient will demonstrate improved time on Five Time Sit to Stand Test to less than or equal to 13 seconds indicating improved LE strength for daily mobility. 5. Patient will demonstrate independence and compliance with discharge home exercise program to improve balance and strength for daily activities. OUTCOME MEASURE:   Tool Used: Alejandra Balance Scale  Score:  Initial: 52/56 Most Recent: X/56 (Date: -- )   Interpretation of Score: Each section is scored on a 0-4 scale, 0 representing the patients inability to perform the task and 4 representing independence. The scores of each section are added together for a total score of 56. The higher the patients score, the more independent the patient is. Any score below 45 indicates increased risk for falls.       Tool Used: Dynamic Gait Index  Score:  Initial: 21/24 Most Recent: X/24 (Date: -- )   Interpretation of Score: Each section is scored on a 0-3 scale, 0 representing the patients inability to perform the task and 3 representing independence. The scores of each section are added together for a total score of 24. Any score below 19 indicates increased risk for falls. Tool Used: Five Times Sit to Stand (FTSST or 5xSST)   Score:  Initial: 16.19 seconds from chair with no UE assist Most Recent: X seconds (Date: -- )   Interpretation of Score: This is a measure of functional lower limb muscle strength and quantifying functional change of transitional movements. A score of 12 seconds or less indicates a normal level of function for community dwelling older individuals, a score of 15 seconds or more is at risk for fall. MEDICAL NECESSITY:   · Patient is expected to demonstrate progress in strength, balance and functional technique to improve safety during daily activities. REASON FOR SERVICES/OTHER COMMENTS:  · Patient continues to demonstrate capacity to improve strength, balance, gait which will increase independence and increase safety. Total Duration:  PT Patient Time In/Time Out  Time In: 1030  Time Out: 1115    Rehabilitation Potential For Stated Goals: Good  Regarding Kamryn Rosario Maryuri's therapy, I certify that the treatment plan above will be carried out by a therapist or under their direction. Thank you for this referral,  Hosea Kaplan PT     Referring Physician Signature: Fran Cespedes MD _______________________________ Date _____________     PAIN/SUBJECTIVE:   Initial: Pain Intensity 1: 0 0 Post Session:  0/10   HISTORY:   History of Injury/Illness (Reason for Referral):  I've gotten weaker. Took fosamax and had a lot of side effects from it. Have lost muscle and feel weak. I want to get my strength back. Dizziness more when bad weather comes. Not really dizzy but head takes longer to catch up if turn too quick, can get off balance. Pressure on head.    Past Medical History/Comorbidities:   Ms. 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 Ms. Hugo Pratt  has a past surgical history that includes hx cholecystectomy (1977); hx orthopaedic (Right); pr lap,tubal cautery; hx open cholecystectomy (1977); hx tubal ligation (Bilateral, 1981); hx gyn (2004); hx cataract removal (Bilateral, 2014); hx tonsillectomy (1950); hx dilation and curettage (1994; 2003); hx wisdom teeth extraction (1964); hx open reduction internal fixation (Right, 1995); hx cataract removal (06/30/2014); hx colonoscopy (2014); and hx breast biopsy. Social History/Living Environment:     lives with daughter and family. Prior Level of Function/Work/Activity:  independent  Dominant Side:         RIGHT  Previous Treatment Approaches:          PT for balance  Personal Factors:          Sex:  female        Age:  76 y.o. Ambulatory/Rehab Services H2 Model Falls Risk Assessment   Risk Factors:       (1)  Dizziness/Vertigo Ability to Rise from Chair:       (1)  Pushes up, successful in one attempt   Falls Prevention Plan:       No modifications necessary   Total: (5 or greater = High Risk): 1   ©2010 [facility] of [facility]. All Rights Reserved. [facility] Patent #[id number]. Federal Law prohibits the replication, distribution or use without written permission from [facility] [employer]   Current Medications:       Current Outpatient Medications:     lisinopril (PRINIVIL, ZESTRIL) 20 mg tablet, Take 1 Tab by mouth daily. , Disp: 90 Tab, Rfl: 3    dicyclomine (BENTYL) 10 mg capsule, Take 1 Cap by mouth three (3) times daily as needed for Diarrhea (abdominal cramping). , Disp: 60 Cap, Rfl: 3    denosumab (PROLIA) 60 mg/mL injection, 1 mL by SubCUTAneous route every 6 months., Disp: 1 Each, Rfl: 0    atorvastatin (LIPITOR) 10 mg tablet, TAKE 1 TABLET BY MOUTH DAILY, Disp: 90 Tab, Rfl: 0    turmeric-herbal complex no. 278 150 mg cap, Take 1 Cap by mouth., Disp: , Rfl:     azelastine (ASTELIN) 137 mcg (0.1 %) nasal spray, 1 Spray by Nasal route., Disp: , Rfl:     famotidine (PEPCID) 20 mg tablet, , Disp: , Rfl: 3    GLUCOSAMINE SULFATE PO, Take 1 Tab by mouth., Disp: , Rfl:     meloxicam (MOBIC) 7.5 mg tablet, Take 1 Tab by mouth daily. , Disp: 30 Tab, Rfl: 2    levothyroxine (SYNTHROID) 100 mcg tablet, Take 1 Tab by mouth Daily (before breakfast). , Disp: 90 Tab, Rfl: 3    nitroglycerin (NITROSTAT) 0.4 mg SL tablet, PLACE 1 TABLET UNDER THE TONGUE EVERY FIVE MINUTES AS NEEDED FOR CHEST PAIN, Disp: , Rfl:     TURMERIC ROOT EXTRACT PO, Take  by mouth., Disp: , Rfl:     cholecalciferol (VITAMIN D3) 1,000 unit cap, Take  by mouth daily. , Disp: , Rfl:     ketotifen (ZADITOR) 0.025 % (0.035 %) ophthalmic solution, Administer 1 Drop to both eyes two (2) times a day., Disp: , Rfl:     fluticasone-vilanterol (BREO ELLIPTA) 100-25 mcg/dose inhaler, Take 1 Puff by inhalation daily. , Disp: , Rfl:     B INFANTIS/B ANI/B IVONNE/B BIFID (PROBIOTIC 4X PO), Take  by mouth daily. , Disp: , Rfl:     Cetirizine (ZYRTEC) 10 mg cap, Take  by mouth., Disp: , Rfl:     montelukast (SINGULAIR) 10 mg tablet, Take 10 mg by mouth daily. , Disp: , Rfl:    Date Last Reviewed:  6/20/19   Number of Personal Factors/Comorbidities that affect the Plan of Care: 1-2: MODERATE COMPLEXITY   EXAMINATION:   Observation/Orthostatic Postural Assessment:          WNL  Strength:          LE STRENGTH:  4-/5 hip flexion, 4/5 hip abduction, 4/5 hip adduction, 4/5 hip extension, 4/5 knee extension, 4/5 knee flexion, 4/5 ankle dorsiflexion, 4/5 ankle plantarflexion. Functional Mobility:         Gait/Ambulation:  WNL, no AD. R foot externally rotates at times to increase balance when challenged        Transfers:  independent        Bed Mobility:  independent        Stairs:  With rail  Sensation:         intact  Postural Control & Balance:  · Alejandra Balance Scale:  52/56.   (A score less than 45/56 indicates high risk of falls)     · Dynamic Gait Index:  21/24.   (A score less than or equal to19/24 is abnormal and predictive of falls)          Body Structures Involved:  1. Nerves  2. Eyes and Ears  3. Muscles Body Functions Affected:  1. Sensory/Pain  2. Neuromusculoskeletal  3. Movement Related Activities and Participation Affected:  1. General Tasks and Demands  2. Mobility  3. Domestic Life  4.  Community, Social and [place]   Number of elements (examined above) that affect the Plan of Care: 3: MODERATE COMPLEXITY   CLINICAL PRESENTATION:   Presentation: Evolving clinical presentation with changing clinical characteristics: MODERATE COMPLEXITY   CLINICAL DECISION MAKING:   Use of outcome tool(s) and clinical judgement create a POC that gives a: Clear prediction of patient's progress: LOW COMPLEXITY

## 2019-06-20 NOTE — PROGRESS NOTES
Flaca Deb  : 1945  [place]  Secondary: Jt Epperson at [address], [address], [place] [address].  [phone]   [phone]         OUTPATIENT PHYSICAL THERAPY: Daily Treatment Note 2019  Visit Count:  1    ICD-10: Treatment Diagnosis: Other abnormalities of gait and mobility R26.89; Dizziness and giddiness R42. Precautions/Allergies:   Codeine; Morphine; Amoxicillin-pot clavulanate; Ciprofloxacin; Demerol [meperidine]; Gatifloxacin; Gluten protein; Sulfamethoxazole-trimethoprim; Telithromycin; Bactrim [sulfamethoprim ds]; Cephalosporins; Gluten; Omnicef [cefdinir]; Phenergan [promethazine]; and Synthroid [levothyroxine]   TREATMENT PLAN:  Effective Dates: 2019 TO 2019 (90 days). Frequency/Duration: 2 times a week for 60- 90 Day(s)    Pre-treatment Symptoms/Complaints:  Weak, off balance  Pain: Initial: Pain Intensity 1: 0 0/10 Post Session:  0/10   Medications Last Reviewed:  2019  Updated Objective Findings:  See evaluation note from today  TREATMENT:     THERAPEUTIC EXERCISE: (15 minutes):  Exercises per grid below to improve mobility and strength. Required minimal verbal cues to promote proper body alignment, promote proper body posture and promote proper body mechanics. Progressed resistance, repetitions and complexity of movement as indicated. Date:  19 Date:   Date:     Activity/Exercise Parameters Parameters Parameters   Standing rows Blue foams 2 x 10 reps     Standing B shoulder extension Yellow band 2 x 10 reps     Standing B elbow extension Yellow band 2 x 10 reps     Wall pushups 2 x 10 reps     Sit to stand X 10 reps from chair                     NEUROMUSCULAR RE-EDUCATION: (0 minutes):  Exercise/activities per grid below to improve balance, coordination, kinesthetic sense, posture and proprioception.   Required minimal verbal cues to promote static and dynamic balance in standing. Activity   Date   Date Date Date Date Date   Activity/Exercise   Sets/reps/equipment Sets/reps/  equipment Sets/reps/  equipment Sets/reps/  equipment Sets/reps/  equipment Sets/reps/  equipment   Walking with head turns             Walking with head up & down             Step ups             Step taps             Marching           Sidestepping           Crossovers           [place]           Walking  backwards             Tandem walking           Weaving in/out of cones             Picking up cones             Sports cord             [employer] ball           Figure 8s            Circles right/left           Walking with 360 degree turns           Spirals           Weight shifting:    Left & Right             Weight shifting: Forward & Backward              Static Standing Balance             Standing with feet apart             Standing with feet together             Standing with feet semitandem           Standing with feet tandem           Single leg stance           X1/X2 Viewing exercises             Hallpike-Sonia testing for BPPV (Benign Paroxysmal Positional Vertigo)             Rainey-Daroff exercises           Canalith Repositioning treatment/Epley Maneuver  for BPPV (Benign Paroxysmal Positional Vertigo)           Smart [employer] Training: See scanned report. [employer] Portal  Treatment/Session Summary:    · Response to Treatment:  Patient tolerated session well. Will reassess progress on HEP next session. .  · Communication/Consultation:  None today  · Equipment provided today:  None today  · Recommendations/Intent for next treatment session: Next visit will focus on therex, balance, habituation exercises. .    Total Treatment Billable Duration:  15 minutes + eval low  PT Patient Time In/Time Out  Time In: 1030  Time Out: 2482 Roxann Wilson, PT    Future Appointments   Date Time Provider Enzo Pulido   7/11/2019 11:00 AM Black Wilson Raquel Madden, PT [facility] SFE   7/18/2019 11:00 AM Susanna Ray, PT [facility] SFE   7/23/2019 11:15 AM Karen Khan MD [facility]F HTF   7/25/2019 11:00 AM Susanna Ray, PT [facility] SFE   7/31/2019 11:00 AM Susanna Ray, PT [facility] SFE   8/8/2019  1:00 PM Karen Khan MD [facility] HTF   4/6/2020 10:15 AM Cosmo Galvan MD [place] [facility] [address]

## 2019-07-01 ENCOUNTER — APPOINTMENT (RX ONLY)
Dept: URBAN - METROPOLITAN AREA CLINIC 349 | Facility: CLINIC | Age: 74
Setting detail: DERMATOLOGY
End: 2019-07-01

## 2019-07-01 DIAGNOSIS — D22 MELANOCYTIC NEVI: ICD-10-CM

## 2019-07-01 DIAGNOSIS — D18.0 HEMANGIOMA: ICD-10-CM

## 2019-07-01 DIAGNOSIS — L85.3 XEROSIS CUTIS: ICD-10-CM

## 2019-07-01 DIAGNOSIS — L29.89 OTHER PRURITUS: ICD-10-CM

## 2019-07-01 DIAGNOSIS — Z71.89 OTHER SPECIFIED COUNSELING: ICD-10-CM

## 2019-07-01 DIAGNOSIS — L82.1 OTHER SEBORRHEIC KERATOSIS: ICD-10-CM

## 2019-07-01 DIAGNOSIS — L72.0 EPIDERMAL CYST: ICD-10-CM

## 2019-07-01 DIAGNOSIS — L29.8 OTHER PRURITUS: ICD-10-CM

## 2019-07-01 PROBLEM — D22.5 MELANOCYTIC NEVI OF TRUNK: Status: ACTIVE | Noted: 2019-07-01

## 2019-07-01 PROBLEM — H91.90 UNSPECIFIED HEARING LOSS, UNSPECIFIED EAR: Status: ACTIVE | Noted: 2019-07-01

## 2019-07-01 PROBLEM — E78.5 HYPERLIPIDEMIA, UNSPECIFIED: Status: ACTIVE | Noted: 2019-07-01

## 2019-07-01 PROBLEM — D18.01 HEMANGIOMA OF SKIN AND SUBCUTANEOUS TISSUE: Status: ACTIVE | Noted: 2019-07-01

## 2019-07-01 PROCEDURE — ? TREATMENT REGIMEN

## 2019-07-01 PROCEDURE — 99213 OFFICE O/P EST LOW 20 MIN: CPT

## 2019-07-01 PROCEDURE — ? COUNSELING

## 2019-07-01 PROCEDURE — ? OTHER

## 2019-07-01 PROCEDURE — ? EDUCATIONAL RESOURCES PROVIDED

## 2019-07-01 ASSESSMENT — LOCATION SIMPLE DESCRIPTION DERM
LOCATION SIMPLE: LOWER BACK
LOCATION SIMPLE: CHEST
LOCATION SIMPLE: LEFT CHEEK
LOCATION SIMPLE: ABDOMEN
LOCATION SIMPLE: UPPER BACK
LOCATION SIMPLE: RIGHT FOREARM
LOCATION SIMPLE: LEFT UPPER BACK
LOCATION SIMPLE: LEFT FOREARM
LOCATION SIMPLE: LEFT HAND
LOCATION SIMPLE: RIGHT PRETIBIAL REGION

## 2019-07-01 ASSESSMENT — LOCATION DETAILED DESCRIPTION DERM
LOCATION DETAILED: EPIGASTRIC SKIN
LOCATION DETAILED: LEFT PROXIMAL DORSAL FOREARM
LOCATION DETAILED: RIGHT PROXIMAL RADIAL DORSAL FOREARM
LOCATION DETAILED: LEFT SUPERIOR UPPER BACK
LOCATION DETAILED: LEFT DISTAL ULNAR DORSAL FOREARM
LOCATION DETAILED: LEFT MEDIAL MALAR CHEEK
LOCATION DETAILED: LEFT MID-UPPER BACK
LOCATION DETAILED: LEFT ULNAR DORSAL HAND
LOCATION DETAILED: PERIUMBILICAL SKIN
LOCATION DETAILED: RIGHT PROXIMAL PRETIBIAL REGION
LOCATION DETAILED: RIGHT MEDIAL SUPERIOR CHEST
LOCATION DETAILED: SUPERIOR LUMBAR SPINE
LOCATION DETAILED: INFERIOR THORACIC SPINE

## 2019-07-01 ASSESSMENT — LOCATION ZONE DERM
LOCATION ZONE: ARM
LOCATION ZONE: FACE
LOCATION ZONE: HAND
LOCATION ZONE: TRUNK
LOCATION ZONE: LEG

## 2019-07-01 NOTE — HPI: SKIN LESIONS
How Severe Is Your Skin Lesion?: mild
Have Your Skin Lesions Been Treated?: not been treated
Is This A New Presentation, Or A Follow-Up?: Skin Lesions
Additional History: Pt is here today for a full body exam. Pt stated that her skin has been feeling dry and itchy and she has had some small lesions around her neck line that have been itchy. Pt denies any family history or personal history of melanoma or any other skin cancers. Pt did state that she has hypothyroidism.

## 2019-07-01 NOTE — PROCEDURE: TREATMENT REGIMEN
Discontinue Regimen: Dial soap
Samples Given: Lipikar
Detail Level: Zone
Initiate Treatment: Wash with dove soap\\n\\nMoisturize as needed

## 2019-07-01 NOTE — PROCEDURE: OTHER
Note Text (......Xxx Chief Complaint.): This diagnosis correlates with the
Detail Level: Zone
Other (Free Text): Pt had stated that she has always had some dry skin but it has gotten worse after her doctor had switched her blood pressure medication. Pt also stated that she had been dehydrated and was hospitalized for it. Pt stated she also has a history of hypothyroidism. \\n\\nPt has been bathing with dial soap. Suggested pt switch to dove soap.\\n\\nClaire stated that pt needs to make sure that she has her thyroid condition under control because that can play a role with the dry skin and itching.  Offered pt a topical steroid to help with the dry spots but pt declined. Gave pt a sample of lipikar moisturizer.

## 2019-07-11 ENCOUNTER — HOSPITAL ENCOUNTER (OUTPATIENT)
Dept: PHYSICAL THERAPY | Age: 74
Discharge: HOME | End: 2019-07-11
Attending: FAMILY MEDICINE
Payer: MEDICARE

## 2019-07-11 PROCEDURE — 97112 NEUROMUSCULAR REEDUCATION: CPT

## 2019-07-11 PROCEDURE — 97110 THERAPEUTIC EXERCISES: CPT

## 2019-07-11 NOTE — PROGRESS NOTES
Yue Yan  : 1945  [place]  Secondary: Jt Epperson at [place] [id number] [facility], [address], [place] [address].  [phone]   [phone]         OUTPATIENT PHYSICAL THERAPY: Daily Treatment Note 2019  Visit Count:  2    ICD-10: Treatment Diagnosis: Other abnormalities of gait and mobility R26.89; Dizziness and giddiness R42. Precautions/Allergies:   Codeine; Morphine; Amoxicillin-pot clavulanate; Ciprofloxacin; Demerol [meperidine]; Gatifloxacin; Gluten protein; Sulfamethoxazole-trimethoprim; Telithromycin; Bactrim [sulfamethoprim ds]; Cephalosporins; Gluten; Omnicef [cefdinir]; Phenergan [promethazine]; and Synthroid [levothyroxine]   TREATMENT PLAN:  Effective Dates: 2019 TO 2019 (90 days). Frequency/Duration: 2 times a week for 60- 90 Day(s)    Pre-treatment Symptoms/Complaints:  Doing well. No falls. Feel foggy in my head, doesn't change with movement/position/activity; working on exercises at home. No pain. Pain: Initial: Pain Intensity 1: 0 0/10 Post Session:  0/10   Medications Last Reviewed:  2019  Updated Objective Findings:  None Today  TREATMENT:     THERAPEUTIC EXERCISE: (20 minutes):  Exercises per grid below to improve mobility and strength. Required minimal verbal cues to promote proper body alignment, promote proper body posture and promote proper body mechanics. Progressed resistance, repetitions and complexity of movement as indicated.    Date:  19 Date:  19   Date:     Activity/Exercise Parameters Parameters Parameters   Nustep      Standing rows Yellow band 2 x 10 reps blue band 2 x 10 reps    Standing B shoulder extension Yellow band 2 x 10 reps green band 2 x 10 reps    Standing B elbow extension Yellow band 2 x 10 reps     Wall pushups 2 x 10 reps 2 x 10 reps    Sit to stand X 10 reps from chair X 10 reps from chair    Shoulder depression  Blue tubing 2 x 10 reps each (gave for HEP also)              NEUROMUSCULAR RE-EDUCATION: (25 minutes):  Exercise/activities per grid below to improve balance, coordination, kinesthetic sense, posture and proprioception. Required minimal verbal cues to promote static and dynamic balance in standing. Activity   Date  7/11/19   Date Date Date Date Date   Activity/Exercise   Sets/reps/equipment Sets/reps/  equipment Sets/reps/  equipment Sets/reps/  equipment Sets/reps/  equipment Sets/reps/  equipment   Walking with head turns     4 laps in hallway normal gait and tandem        Walking with head up & down     4 laps in hallway normal gait and tandem        Step overs             Step taps             Marching   2 laps in hallway        Sidestepping           Crossovers   4 laps in hallway        [facility]  backwards             Tandem walking           Weaving in/out of cones             Picking up cones             Sports cord             [employer] ball           Figure 8s            Circles right/left           Walking with 360 degree turns           Spirals           Weight shifting:    Left & Right     rockerboard eyes open and closed        Weight shifting: Forward & Backward      rockerboard eyes open and closed        Static Standing Balance     rockerboard eyes open with head turns, rockerboard eyes closed. Standing with feet apart             Standing with feet together             Standing with feet semitandem           Standing with feet tandem           Single leg stance           X1/X2 Viewing exercises             Hallpike-[place] testing for BPPV (Benign Paroxysmal Positional Vertigo)             Rainey-Daroff exercises           Canalith Repositioning treatment/Epley Maneuver  for BPPV (Benign Paroxysmal Positional Vertigo)           Smart [employer] Training: See scanned report.              [employer] Portal  Treatment/Session Summary:    · Response to Treatment:  Patient tolerated session well. She reported no change in dizziness with any activities today. Patient maintained balance well with exercises but was challenged with tandem gait activities and balance on rockerboard. Continue to progress as tolerated. Plan of treatment was written for 2x/week, however patient decided to come once a week and work on HEP, therefore schedule reflects this change. · Communication/Consultation:  None today  · Equipment provided today:  None today  · Recommendations/Intent for next treatment session: Next visit will focus on therex, balance, habituation exercises. .    Total Treatment Billable Duration:  45 minutes  PT Patient Time In/Time Out  Time In: 1100  Time Out: [address], PT    Future Appointments   Date Time Provider Enzo Pulido   7/18/2019 11:00 AM Armando Encarnacion PT [facility] S[facility]   7/22/2019  1:45 PM Armando Encarnacion PT [facility] SFE   7/23/2019 11:15 AM MD GINA Sher HTF HTF   7/25/2019  7:00 PM Armando Encarnacion PT [facility] SFE   7/31/2019 11:00 AM Armando Encarnacion PT [facility]PT SFE   8/8/2019  1:00 PM Lore Dobson MD [facility] HTF HTF   10/3/2019 10:30 AM Nimisha Peña MD [facility] BSNE   4/6/2020 10:15 AM Bernarda Crowley MD [place] [facility] [address] [place] [address]

## 2019-07-18 ENCOUNTER — HOSPITAL ENCOUNTER (OUTPATIENT)
Dept: PHYSICAL THERAPY | Age: 74
Discharge: HOME | End: 2019-07-18
Attending: FAMILY MEDICINE
Payer: MEDICARE

## 2019-07-18 PROCEDURE — 97110 THERAPEUTIC EXERCISES: CPT

## 2019-07-18 PROCEDURE — 97112 NEUROMUSCULAR REEDUCATION: CPT

## 2019-07-18 NOTE — PROGRESS NOTES
Lorna Schaumann  : 1945  [place]  Secondary: Jt Huerta 75 at [place]  [address], [address], [address] [facility]  [phone]   [phone]         OUTPATIENT PHYSICAL THERAPY: Daily Treatment Note 2019  Visit Count:  3    ICD-10: Treatment Diagnosis: Other abnormalities of gait and mobility R26.89; Dizziness and giddiness R42. Precautions/Allergies:   Codeine; Morphine; Amoxicillin-pot clavulanate; Ciprofloxacin; Demerol [meperidine]; Gatifloxacin; Gluten protein; Sulfamethoxazole-trimethoprim; Telithromycin; Bactrim [sulfamethoprim ds]; Cephalosporins; Gluten; Omnicef [cefdinir]; Phenergan [promethazine]; and Synthroid [levothyroxine]   TREATMENT PLAN:  Effective Dates: 2019 TO 2019 (90 days). Frequency/Duration: 2 times a week for 60- 90 Day(s)    Pre-treatment Symptoms/Complaints:  Just a little off balance. Pain: Initial: Pain Intensity 1: 0 0/10 Post Session:  0/10   Medications Last Reviewed:  2019  Updated Objective Findings:  None Today  TREATMENT:     THERAPEUTIC EXERCISE: (18 minutes):  Exercises per grid below to improve mobility and strength. Required minimal verbal cues to promote proper body alignment, promote proper body posture and promote proper body mechanics. Progressed resistance, repetitions and complexity of movement as indicated.    Date:  19 Date:  19   Date:  19     Activity/Exercise Parameters Parameters Parameters   Nustep      Standing rows Yellow band 2 x 10 reps blue band 2 x 10 reps blue band 2 x 10 reps   Standing B shoulder extension Yellow band 2 x 10 reps green band 2 x 10 reps green band 2 x 10 reps   Standing B elbow extension Yellow band 2 x 10 reps     Wall pushups 2 x 10 reps 2 x 10 reps 2 x 10 reps   Sit to stand X 10 reps from chair X 10 reps from chair 2X 10 reps from chair   Shoulder depression  Blue tubing 2 x 10 reps each (gave for HEP also) Blue tubing 2 x 10 reps each              NEUROMUSCULAR RE-EDUCATION: (25 minutes):  Exercise/activities per grid below to improve balance, coordination, kinesthetic sense, posture and proprioception. Required minimal verbal cues to promote static and dynamic balance in standing. Activity   Date  7/11/19   Date  7/18/19 Date Date Date Date   Activity/Exercise   Sets/reps/equipment Sets/reps/  equipment Sets/reps/  equipment Sets/reps/  equipment Sets/reps/  equipment Sets/reps/  equipment   Walking with head turns     4 laps in hallway normal gait and tandem 4 laps in hallway normal gait and tandem       Walking with head up & down     4 laps in hallway normal gait and tandem 4 laps in hallway normal gait and tandem       Step overs             Step taps             Marching   2 laps in hallway 4 laps in hallway       Sidestepping           Crossovers   4 laps in hallway        [place]    4 laps in hallway       Walking  backwards             Tandem walking    4 laps in hallway       Weaving in/out of cones             Picking up cones             Sports cord             [employer] ball           Figure 8s            Circles right/left           Walking with 360 degree turns           Spirals           Weight shifting:    Left & Right     rockerboard eyes open and closed rockerboard eyes open and closed       Weight shifting: Forward & Backward      rockerboard eyes open and closed rockerboard eyes open and closed       Static Standing Balance     rockerboard eyes open with head turns, rockerboard eyes closed. rockerboard eyes open with head turns, rockerboard eyes closed.        Standing with feet apart             Standing with feet together             Standing with feet semitandem           Standing with feet tandem           Single leg stance           X1/X2 Viewing exercises             Hallpike-Sonia testing for BPPV (Benign Paroxysmal Positional Vertigo) Rainey-Pasquale exercises           Canalith Repositioning treatment/Epley Maneuver  for BPPV (Benign Paroxysmal Positional Vertigo)           Smart [employer] Training: See scanned report. [employer] Portal  Treatment/Session Summary:    · Response to Treatment:  Patient tolerated session well. Balance and strength are progressing well. Continue to progress as tolerated. Plan of treatment was written for 2x/week, however patient decided to come once a week and work on HEP, therefore schedule reflects this change. · Communication/Consultation:  None today  · Equipment provided today:  None today  · Recommendations/Intent for next treatment session: Next visit will focus on therex, balance, habituation exercises. .    Total Treatment Billable Duration:  43 minutes  PT Patient Time In/Time Out  Time In: 1100  Time Out: 4 Kaye Duarte, PT    Future Appointments   Date Time Provider Enzo Pulido   7/23/2019  8:45 AM Nicki Whipple PT [facility] SFE   7/23/2019 11:15 AM MD GINA Means HTF HTF   7/25/2019  7:00 PM Nicki Whipple PT [facility] SFE   7/31/2019 11:00 AM Nicki Whipple PT [facility] SFE   8/8/2019  1:00 PM MD GINA Means HTF HTF   10/3/2019 10:30 AM Adriana Hart MD [facility] BSNE   4/6/2020 10:15 AM Marii Gonzales MD [place] [facility] [address] [place] [address]

## 2019-07-23 ENCOUNTER — HOSPITAL ENCOUNTER (OUTPATIENT)
Dept: PHYSICAL THERAPY | Age: 74
Discharge: HOME | End: 2019-07-23
Attending: FAMILY MEDICINE
Payer: MEDICARE

## 2019-07-23 PROCEDURE — 97112 NEUROMUSCULAR REEDUCATION: CPT

## 2019-07-23 PROCEDURE — 97110 THERAPEUTIC EXERCISES: CPT

## 2019-07-23 NOTE — PROGRESS NOTES
Kellee Ling  : 1945  [place]  Secondary: Jt Huerta 75 at [facility]  [address], [address], [place] [address]  [phone]   [phone]         OUTPATIENT PHYSICAL THERAPY: Daily Treatment Note 2019  Visit Count:  4    ICD-10: Treatment Diagnosis: Other abnormalities of gait and mobility R26.89; Dizziness and giddiness R42. Precautions/Allergies:   Codeine; Morphine; Amoxicillin-pot clavulanate; Ciprofloxacin; Demerol [meperidine]; Gatifloxacin; Gluten protein; Sulfamethoxazole-trimethoprim; Telithromycin; Bactrim [sulfamethoprim ds]; Cephalosporins; Gluten; Omnicef [cefdinir]; Phenergan [promethazine]; and Synthroid [levothyroxine]   TREATMENT PLAN:  Effective Dates: 2019 TO 2019 (90 days). Frequency/Duration: 2 times a week for 60- 90 Day(s)    Pre-treatment Symptoms/Complaints:  Doing ok. Pain: Initial: Pain Intensity 1: 0 0/10 Post Session:  0/10   Medications Last Reviewed:  2019  Updated Objective Findings:  None Today  TREATMENT:     THERAPEUTIC EXERCISE: (15 minutes):  Exercises per grid below to improve mobility and strength. Required minimal verbal cues to promote proper body alignment, promote proper body posture and promote proper body mechanics. Progressed resistance, repetitions and complexity of movement as indicated. Date:  19     Activity/Exercise Parameters   Nustep    Standing rows blue band 2 x 10 reps   Standing B shoulder extension green band 2 x 10 reps   Standing B shoulder ER (lower traps) Yellow band 2 x 10 reps B   Wall pushups 2 x 10 reps   Sit to stand 2X 10 reps from chair   Pull downs Blue tubing 2 x 10 reps           NEUROMUSCULAR RE-EDUCATION: (25 minutes):  Exercise/activities per grid below to improve balance, coordination, kinesthetic sense, posture and proprioception.   Required minimal verbal cues to promote static and dynamic balance in standing. Activity   Date  7/23/19 Date   Activity/Exercise   Sets/reps/  equipment Sets/reps/  equipment   Walking with head turns     4 laps in hallway normal gait and tandem    Walking with head up & down     4 laps in hallway normal gait and tandem    Step overs         Step taps         Marching   4 laps in hallway    Sidestepping       Crossovers       [place]   4 laps in hallway    Walking  backwards         Tandem walking   4 laps in hallway    Weaving in/out of cones         Picking up cones         Sports cord         [employer]       Kick ball       Figure 8s        Circles right/left       Walking with 360 degree turns       Spirals       Weight shifting:    Left & Right     rockerboard eyes open and closed    Weight shifting: Forward & Backward      rockerboard eyes open and closed    Static Standing Balance     rockerboard eyes open with head turns, rockerboard eyes closed. Standing with feet apart         Standing with feet together         Standing with feet semitandem   Blue foams eyes closed with head turns. Standing with feet tandem       Single leg stance       X1/X2 Viewing exercises         Hallpike-[place] testing for BPPV (Benign Paroxysmal Positional Vertigo)         Rainey-Daroff exercises       Canalith Repositioning treatment/Epley Maneuver  for BPPV (Benign Paroxysmal Positional Vertigo)       [employer] Training: See scanned report. [employer] Portal  Treatment/Session Summary:    · Response to Treatment:  Patient tolerated session well. No dizziness with exercises, mild imbalance. Continue to progress as tolerated. Plan of treatment was written for 2x/week, however patient decided to come once a week and work on HEP, therefore schedule reflects this change.    · Communication/Consultation:  None today  · Equipment provided today:  None today  · Recommendations/Intent for next treatment session: Next visit will focus on therex, balance, habituation exercises. .    Total Treatment Billable Duration:  40 minutes  PT Patient Time In/Time Out  Time In: 0845  Time Out: [address], PT    Future Appointments   Date Time Provider Enzo Tess   7/23/2019 11:15 AM Lore Dobson MD [facility] HTF   7/25/2019  7:00 PM Pamjoaquin Encarnacion, PT [facility]PT SFE   7/31/2019 11:00 AM Armando Encarnacion, PT S[facility]PT SFE   8/8/2019  1:00 PM Lore Dobson MD [facility] HTF   10/3/2019 10:30 AM Nimisha Peña MD [facility] BS[facility]   4/6/2020 10:15 AM Bernarda Crowley MD [place] [facility] [address] [place] [address]

## 2019-07-25 ENCOUNTER — APPOINTMENT (OUTPATIENT)
Dept: PHYSICAL THERAPY | Age: 74
End: 2019-07-25
Attending: FAMILY MEDICINE
Payer: MEDICARE

## 2019-07-31 ENCOUNTER — HOSPITAL ENCOUNTER (OUTPATIENT)
Dept: PHYSICAL THERAPY | Age: 74
Discharge: HOME | End: 2019-07-31
Attending: FAMILY MEDICINE
Payer: MEDICARE

## 2019-07-31 PROCEDURE — 97110 THERAPEUTIC EXERCISES: CPT

## 2019-07-31 PROCEDURE — 97112 NEUROMUSCULAR REEDUCATION: CPT

## 2019-07-31 NOTE — PROGRESS NOTES
Jenn Marquezrra  : 1945  [place]  Secondary: Jt Epperson at [place] [id number] [facility], [address], [address] [facility]  [phone]   [phone]         OUTPATIENT PHYSICAL THERAPY: Daily Treatment Note 2019  Visit Count:  5    ICD-10: Treatment Diagnosis: Other abnormalities of gait and mobility R26.89; Dizziness and giddiness R42. Precautions/Allergies:   Codeine; Morphine; Amoxicillin-pot clavulanate; Ciprofloxacin; Demerol [meperidine]; Gatifloxacin; Gluten protein; Sulfamethoxazole-trimethoprim; Telithromycin; Bactrim [sulfamethoprim ds]; Cephalosporins; Gluten; Omnicef [cefdinir]; Phenergan [promethazine]; and Synthroid [levothyroxine]   TREATMENT PLAN:  Effective Dates: 2019 TO 2019. Frequency/Duration: discharge today    Pre-treatment Symptoms/Complaints:  Doing well. Just got back from the beach. I'm ready for this to be my last day. I think I can continue my exercises at home. Pain: Initial: Pain Intensity 1: 0 0/10 Post Session:  0/10   Medications Last Reviewed:  2019  Updated Objective Findings:  None Today  TREATMENT:     THERAPEUTIC EXERCISE: (15 minutes):  Exercises per grid below to improve mobility and strength. Required minimal verbal cues to promote proper body alignment, promote proper body posture and promote proper body mechanics. Progressed resistance, repetitions and complexity of movement as indicated.    Date:  19     Activity/Exercise Parameters   Nustep    Standing rows blue band 2 x 10 reps   Standing B shoulder extension green band 2 x 10 reps   Standing B shoulder ER (lower traps) Yellow band 2 x 10 reps B   Wall pushups 2 x 10 reps   Sit to stand 2X 10 reps from chair   Pull downs Blue tubing 2 x 10 reps           NEUROMUSCULAR RE-EDUCATION: (30 minutes):  Exercise/activities per grid below to improve balance, coordination, kinesthetic sense, posture and proprioception. Required minimal verbal cues to promote static and dynamic balance in standing. Activity   Date  7/31/19 Date   Activity/Exercise   Sets/reps/  equipment Sets/reps/  equipment   Walking with head turns     4 laps in hallway normal gait and tandem    Walking with head up & down     4 laps in hallway normal gait and tandem    Step overs         Step taps         Marching   4 laps in hallway    Sidestepping       Crossovers       [place]   4 laps in hallway    Walking  backwards         Tandem walking   4 laps in hallway with head turns    Weaving in/out of cones     4 laps in hallway    Sidestepping over cones     2 laps    Sports cord         [employer]       Kick ball       Figure 8s        Circles right/left       Walking with 360 degree turns       Spirals       Weight shifting:    Left & Right     rockerboard eyes open and closed    Weight shifting: Forward & Backward      rockerboard eyes open and closed    Static Standing Balance     rockerboard and sanddune eyes open with head turns, rockerboard eyes closed. Standing with feet apart         Standing with feet together         Standing with feet semitandem   Blue foams eyes closed with head turns. Standing with feet tandem       Single leg stance       X1/X2 Viewing exercises         Hallpike-Sonia testing for BPPV (Benign Paroxysmal Positional Vertigo)         Rainey-Daroff exercises       Canalith Repositioning treatment/Epley Maneuver  for BPPV (Benign Paroxysmal Positional Vertigo)       Smart Equitest Training: See scanned report. [employer] Portal  Treatment/Session Summary:    · Response to Treatment:  Patient tolerated session well. Patient has made excellent progress with PT. We will discharge today with HEP.       · Communication/Consultation:  None today  · Equipment provided today:  None today  · Recommendations/Intent for next treatment session: Next visit will focus on therex, balance, habituation exercises. .    Total Treatment Billable Duration:  45 minutes  PT Patient Time In/Time Out  Time In: 1100  Time Out: [address], PT    Future Appointments   Date Time Provider Enzo Pulido   8/8/2019  1:00 PM Dedrick Owens MD [facility]   10/3/2019 10:30 AM Domingo Martinez MD [facility]   4/6/2020 10:15 AM Bisi Foster MD [facility] [address] [place] [address]

## 2019-07-31 NOTE — THERAPY DISCHARGE
Raoul Del Cid  : 1945  [place]  Secondary: Jt Epperson at [place] [id number] [facility], [address] [place] [address],[address], [facility].  [phone]   [phone]           OUTPATIENT PHYSICAL THERAPY:Discharge Summary 2019   ICD-10: Treatment Diagnosis: Other abnormalities of gait and mobility R26.89; Dizziness and giddiness R42; muscle weakness (generalized) M62.81  Precautions/Allergies:   Codeine; Morphine; Amoxicillin-pot clavulanate; Ciprofloxacin; Demerol [meperidine]; Gatifloxacin; Gluten protein; Sulfamethoxazole-trimethoprim; Telithromycin; Bactrim [sulfamethoprim ds]; Cephalosporins; Gluten; Omnicef [cefdinir]; Phenergan [promethazine]; and Synthroid [levothyroxine]   TREATMENT PLAN:  Effective Dates: 2019 TO 2019 (90 days). Frequency/Duration: 1-2 times a week for 60- 90 Day(s) MEDICAL/REFERRING DIAGNOSIS:  Unspecified abnormalities of gait and mobility [R26.9]   DATE OF ONSET: about 2 months  REFERRING PHYSICIAN: Stanley Zhou MD MD Orders: referral to PT for balance training and gait strengthening  Return MD Appointment:      INITIAL ASSESSMENT:  Ms. Jennifer Munoz presents with weakness, imbalance, and mild dizziness. Patient was educated in home exercise program today. . Patient would benefit from PT to address these problems to improve patient's independence and safety with mobility and daily activities. Thank you. DISCHARGE SUMMARY 19: Ms. Jennifer Munoz has attended 5 PT sessions from 19 to 19 for weakness, imbalance, and mild dizziness. Patient demonstrates improved balance with testing and mobility. She reports no problems with dizziness. Patient has met goals and is independent in HEP. Discharge today. Thank you. PROBLEM LIST (Impacting functional limitations):  1. Decreased Strength  2. Decreased ADL/Functional Activities  3. Decreased Transfer Abilities  4.  Decreased Balance  5. Decreased Activity Tolerance  6. Decreased [place] with Home Exercise Program INTERVENTIONS PLANNED: (Treatment may consist of any combination of the following)  1. Balance Exercise  2. Gait Training  3. Home Exercise Program (HEP)  4. Neuromuscular Re-education/Strengthening  5. Therapeutic Activites  6. Therapeutic Exercise/Strengthening     GOALS: (Goals have been discussed and agreed upon with patient.)  Short-Term Functional Goals: Time Frame: 2-4 weeks  1. Patient will demonstrate independence and compliance with home exercise program to improve balance and strength for daily activities. MET  2. Patient will increase her score on the Alejandra Balance Scale to greater than or equal to 53/56 indicating improved safety and decreased fall risk for daily activities. MET  3. Discharge Goals: Time Frame: 8-12 weeks  1. Patient will increase her score on the Alejandra Balance Scale to greater than or equal to 55/56 indicating improved safety and decreased fall risk for daily activities. MET  2. Patient will increase her score on the dynamic gait index to greater than or equal to 23/24 indicating improved balance and safety for community ambulation. MET  3. Patient will ambulate with least assistive device over level and unlevel surfaces without evidence of imbalance to improve safety for daily activities. MET  4. Patient will demonstrate improved time on Five Time Sit to Stand Test to less than or equal to 13 seconds indicating improved LE strength for daily mobility. MET  5. Patient will demonstrate independence and compliance with discharge home exercise program to improve balance and strength for daily activities. MET      OUTCOME MEASURE:   Tool Used: Alejandra Balance Scale  Score:  Initial: 52/56 Most Recent: 55/56 (Date: 7/31/19 )   Interpretation of Score: Each section is scored on a 0-4 scale, 0 representing the patients inability to perform the task and 4 representing independence.   The scores of each section are added together for a total score of 56. The higher the patients score, the more independent the patient is. Any score below 45 indicates increased risk for falls. Tool Used: Dynamic Gait Index  Score:  Initial: 21/24 Most Recent: 23/24 (Date: 7/31/19 )   Interpretation of Score: Each section is scored on a 0-3 scale, 0 representing the patients inability to perform the task and 3 representing independence. The scores of each section are added together for a total score of 24. Any score below 19 indicates increased risk for falls. Tool Used: Five Times Sit to Stand (FTSST or 5xSST)   Score:  Initial: 16.19 seconds from chair with no UE assist Most Recent: 13 seconds (Date: 7/31/19 )   Interpretation of Score: This is a measure of functional lower limb muscle strength and quantifying functional change of transitional movements. A score of 12 seconds or less indicates a normal level of function for community dwelling older individuals, a score of 15 seconds or more is at risk for fall. MEDICAL NECESSITY:   · Patient is expected to demonstrate progress in strength, balance and functional technique to improve safety during daily activities. REASON FOR SERVICES/OTHER COMMENTS:  · Patient continues to demonstrate capacity to improve strength, balance, gait which will increase independence and increase safety. Total Duration:  PT Patient Time In/Time Out  Time In: 1100  Time Out: 1145    Rehabilitation Potential For Stated Goals: Good  Regarding Nidhi Wahl's therapy, I certify that the treatment plan above will be carried out by a therapist or under their direction. Thank you for this referral,  Janelle Cowan, PT     Referring Physician Signature: Rama Vazquez MD No Signature is Required for this note. PAIN/SUBJECTIVE:   Initial: Pain Intensity 1: 0 0 Post Session:  0/10   HISTORY:   History of Injury/Illness (Reason for Referral):  I've gotten weaker.  Took fosamax and had a lot of side effects from it. Have lost muscle and feel weak. I want to get my strength back. Dizziness more when bad weather comes. Not really dizzy but head takes longer to catch up if turn too quick, can get off balance. Pressure on head. Past Medical History/Comorbidities:   Ms. Rodney Nielsen  has a past medical history of Acid reflux, Arrhythmia (2013), Arthritis, Asthma, Bronchitis, CAD (coronary artery disease), Essential hypertension (6/13/2016), GERD (gastroesophageal reflux disease), Gluten intolerance, H/O echocardiogram (2013), H/O exercise stress test (2013), Headache, Hearing loss, Hiatal hernia, Hypercholesterolemia, Hypothyroidism, IBS (irritable bowel syndrome), Memory difficulties (5/24/2018), Menopause, Osteopenia (10/2/2015), Pap smear for cervical cancer screening (2012), Plantar fasciitis, and Supraventricular premature beats (6/23/2015). Ms. Rodney Nielsen  has a past surgical history that includes hx cholecystectomy (1977); hx orthopaedic (Right); pr lap,tubal cautery; hx open cholecystectomy (1977); hx tubal ligation (Bilateral, 1981); hx gyn (2004); hx dilation and curettage (1994; 2003); hx wisdom teeth extraction (1964); hx open reduction internal fixation (Right, 1995); hx colonoscopy (2014); hx breast biopsy; hx cataract removal (Bilateral, 2014); hx tonsillectomy (1950); hx cataract removal (06/30/2014); and hx heent (Left, 07/08/2019). Social History/Living Environment:     lives with daughter and family. Prior Level of Function/Work/Activity:  independent  Dominant Side:         RIGHT  Previous Treatment Approaches:          PT for balance  Personal Factors:          Sex:  female        Age:  76 y.o.    Ambulatory/Rehab Services H2 Model Falls Risk Assessment   Risk Factors:       (1)  Dizziness/Vertigo Ability to Rise from Chair:       (1)  Pushes up, successful in one attempt   Falls Prevention Plan:       No modifications necessary   Total: (5 or greater = High Risk): 1   ©2010 [employer] [place] [facility] States Patent #[id number]. Federal Law prohibits the replication, distribution or use without written permission from [facility]   Current Medications:       Current Outpatient Medications:     pantoprazole (PROTONIX) 20 mg tablet, Take 1 Tab by mouth daily. D/C PREVACID, Disp: 30 Tab, Rfl: 5    atorvastatin (LIPITOR) 10 mg tablet, TAKE 1 TABLET BY MOUTH DAILY, Disp: 90 Tab, Rfl: 3    lisinopril (PRINIVIL, ZESTRIL) 20 mg tablet, Take 1 Tab by mouth daily. , Disp: 90 Tab, Rfl: 3    dicyclomine (BENTYL) 10 mg capsule, Take 1 Cap by mouth three (3) times daily as needed for Diarrhea (abdominal cramping). , Disp: 60 Cap, Rfl: 3    denosumab (PROLIA) 60 mg/mL injection, 1 mL by SubCUTAneous route every 6 months., Disp: 1 Each, Rfl: 0    turmeric-herbal complex no. 278 150 mg cap, Take 1 Cap by mouth., Disp: , Rfl:     azelastine (ASTELIN) 137 mcg (0.1 %) nasal spray, 1 Spray by Nasal route., Disp: , Rfl:     GLUCOSAMINE SULFATE PO, Take 1 Tab by mouth., Disp: , Rfl:     meloxicam (MOBIC) 7.5 mg tablet, Take 1 Tab by mouth daily. , Disp: 30 Tab, Rfl: 2    levothyroxine (SYNTHROID) 100 mcg tablet, Take 1 Tab by mouth Daily (before breakfast). , Disp: 90 Tab, Rfl: 3    nitroglycerin (NITROSTAT) 0.4 mg SL tablet, PLACE 1 TABLET UNDER THE TONGUE EVERY FIVE MINUTES AS NEEDED FOR CHEST PAIN, Disp: , Rfl:     TURMERIC ROOT EXTRACT PO, Take  by mouth., Disp: , Rfl:     cholecalciferol (VITAMIN D3) 1,000 unit cap, Take  by mouth daily. , Disp: , Rfl:     ketotifen (ZADITOR) 0.025 % (0.035 %) ophthalmic solution, Administer 1 Drop to both eyes two (2) times a day., Disp: , Rfl:     fluticasone-vilanterol (BREO ELLIPTA) 100-25 mcg/dose inhaler, Take 1 Puff by inhalation daily. , Disp: , Rfl:     B INFANTIS/B ANI/B IVONNE/B BIFID (PROBIOTIC 4X PO), Take  by mouth daily. , Disp: , Rfl:     Cetirizine (ZYRTEC) 10 mg cap, Take  by mouth., Disp: , Rfl:     montelukast (SINGULAIR) 10 mg tablet, Take 10 mg by mouth daily. , Disp: , Rfl:    Date Last Reviewed:  7/31/19   Number of Personal Factors/Comorbidities that affect the Plan of Care: 1-2: MODERATE COMPLEXITY   EXAMINATION:   Observation/Orthostatic Postural Assessment:          WNL  Strength:          LE STRENGTH:  4-/5 hip flexion, 4/5 hip abduction, 4/5 hip adduction, 4/5 hip extension, 4/5 knee extension, 4/5 knee flexion, 4/5 ankle dorsiflexion, 4/5 ankle plantarflexion. Functional Mobility:         Gait/Ambulation:  WNL, no AD. R foot externally rotates at times to increase balance when challenged        Transfers:  independent        Bed Mobility:  independent        Stairs:  With rail  Sensation:         intact  Postural Control & Balance:  · Alejandra Balance Scale:  52/56.   (A score less than 45/56 indicates high risk of falls)     · Dynamic Gait Index:  21/24.   (A score less than or equal to19/24 is abnormal and predictive of falls)          Body Structures Involved:  1. Nerves  2. Eyes and Ears  3. Muscles Body Functions Affected:  1. Sensory/Pain  2. Neuromusculoskeletal  3. Movement Related Activities and Participation Affected:  1. General Tasks and Demands  2. Mobility  3. Domestic Life  4.  Community, Social and [address]   Number of elements (examined above) that affect the Plan of Care: 3: MODERATE COMPLEXITY   CLINICAL PRESENTATION:   Presentation: Evolving clinical presentation with changing clinical characteristics: MODERATE COMPLEXITY   CLINICAL DECISION MAKING:   Use of outcome tool(s) and clinical judgement create a POC that gives a: Clear prediction of patient's progress: LOW COMPLEXITY DISPLAY PLAN FREE TEXT

## 2019-09-26 ENCOUNTER — HOSPITAL ENCOUNTER (OUTPATIENT)
Dept: GENERAL RADIOLOGY | Age: 74
Discharge: HOME | End: 2019-09-26
Attending: FAMILY MEDICINE
Payer: MEDICARE

## 2019-09-26 DIAGNOSIS — M54.42 ACUTE LEFT-SIDED LOW BACK PAIN WITH LEFT-SIDED SCIATICA: ICD-10-CM

## 2019-09-26 PROCEDURE — 72110 X-RAY EXAM L-2 SPINE 4/>VWS: CPT

## 2019-09-27 NOTE — PROGRESS NOTES
Degenerative changes (arthritis) and spondylosis (malpositioning) throughout the low back.  Recommend anti-inflammatories, PT referral.

## 2019-10-31 ENCOUNTER — HOSPITAL ENCOUNTER (OUTPATIENT)
Dept: GENERAL RADIOLOGY | Age: 74
Discharge: HOME | End: 2019-10-31
Payer: MEDICARE

## 2019-10-31 DIAGNOSIS — R05.9 COUGH: ICD-10-CM

## 2019-10-31 PROCEDURE — 71046 X-RAY EXAM CHEST 2 VIEWS: CPT

## 2019-12-06 PROBLEM — E03.9 HYPOTHYROIDISM: Status: ACTIVE | Noted: 2019-12-06

## 2020-06-12 ENCOUNTER — HOSPITAL ENCOUNTER (OUTPATIENT)
Dept: GENERAL RADIOLOGY | Age: 75
Discharge: HOME | End: 2020-06-12
Payer: MEDICARE

## 2020-06-12 DIAGNOSIS — J45.909 UNCOMPLICATED ASTHMA, UNSPECIFIED ASTHMA SEVERITY, UNSPECIFIED WHETHER PERSISTENT: ICD-10-CM

## 2020-06-12 PROCEDURE — 71046 X-RAY EXAM CHEST 2 VIEWS: CPT

## 2020-08-14 ENCOUNTER — HOSPITAL ENCOUNTER (OUTPATIENT)
Dept: MAMMOGRAPHY | Age: 75
Discharge: HOME | End: 2020-08-14
Attending: OBSTETRICS & GYNECOLOGY
Payer: MEDICARE

## 2020-08-14 DIAGNOSIS — Z12.31 VISIT FOR SCREENING MAMMOGRAM: ICD-10-CM

## 2020-08-14 PROCEDURE — 77063 BREAST TOMOSYNTHESIS BI: CPT

## 2020-09-28 ENCOUNTER — APPOINTMENT (RX ONLY)
Dept: URBAN - METROPOLITAN AREA CLINIC 349 | Facility: CLINIC | Age: 75
Setting detail: DERMATOLOGY
End: 2020-09-28

## 2020-09-28 DIAGNOSIS — Z71.89 OTHER SPECIFIED COUNSELING: ICD-10-CM

## 2020-09-28 DIAGNOSIS — D22 MELANOCYTIC NEVI: ICD-10-CM

## 2020-09-28 DIAGNOSIS — L82.1 OTHER SEBORRHEIC KERATOSIS: ICD-10-CM

## 2020-09-28 DIAGNOSIS — D18.0 HEMANGIOMA: ICD-10-CM

## 2020-09-28 DIAGNOSIS — L82.0 INFLAMED SEBORRHEIC KERATOSIS: ICD-10-CM

## 2020-09-28 PROBLEM — K21.9 GASTRO-ESOPHAGEAL REFLUX DISEASE WITHOUT ESOPHAGITIS: Status: ACTIVE | Noted: 2020-09-28

## 2020-09-28 PROBLEM — D22.5 MELANOCYTIC NEVI OF TRUNK: Status: ACTIVE | Noted: 2020-09-28

## 2020-09-28 PROBLEM — L85.3 XEROSIS CUTIS: Status: ACTIVE | Noted: 2020-09-28

## 2020-09-28 PROBLEM — D18.01 HEMANGIOMA OF SKIN AND SUBCUTANEOUS TISSUE: Status: ACTIVE | Noted: 2020-09-28

## 2020-09-28 PROCEDURE — 99213 OFFICE O/P EST LOW 20 MIN: CPT | Mod: 25

## 2020-09-28 PROCEDURE — 17110 DESTRUCTION B9 LES UP TO 14: CPT

## 2020-09-28 PROCEDURE — ? COUNSELING

## 2020-09-28 PROCEDURE — ? EDUCATIONAL RESOURCES PROVIDED

## 2020-09-28 PROCEDURE — ? LIQUID NITROGEN

## 2020-09-28 ASSESSMENT — LOCATION DETAILED DESCRIPTION DERM
LOCATION DETAILED: RIGHT CLAVICULAR SKIN
LOCATION DETAILED: LEFT PROXIMAL CALF
LOCATION DETAILED: LEFT ULNAR DORSAL HAND
LOCATION DETAILED: LEFT INFERIOR LATERAL UPPER BACK
LOCATION DETAILED: LEFT INFERIOR UPPER BACK
LOCATION DETAILED: EPIGASTRIC SKIN
LOCATION DETAILED: LEFT SUPERIOR UPPER BACK
LOCATION DETAILED: SUPERIOR LUMBAR SPINE
LOCATION DETAILED: LEFT INFERIOR LATERAL NECK
LOCATION DETAILED: RIGHT INFERIOR LATERAL NECK
LOCATION DETAILED: PERIUMBILICAL SKIN

## 2020-09-28 ASSESSMENT — LOCATION SIMPLE DESCRIPTION DERM
LOCATION SIMPLE: LEFT ANTERIOR NECK
LOCATION SIMPLE: RIGHT ANTERIOR NECK
LOCATION SIMPLE: LEFT CALF
LOCATION SIMPLE: ABDOMEN
LOCATION SIMPLE: LOWER BACK
LOCATION SIMPLE: LEFT HAND
LOCATION SIMPLE: LEFT UPPER BACK
LOCATION SIMPLE: RIGHT CLAVICULAR SKIN

## 2020-09-28 ASSESSMENT — LOCATION ZONE DERM
LOCATION ZONE: TRUNK
LOCATION ZONE: HAND
LOCATION ZONE: LEG
LOCATION ZONE: NECK

## 2020-09-28 NOTE — PROCEDURE: COUNSELING
Yes, she can come in next week.  Most importantly, she needs to see the vascular doctor.  
Detail Level: Generalized
Detail Level: Zone

## 2020-09-28 NOTE — PROCEDURE: LIQUID NITROGEN
Add 52 Modifier (Optional): no
Medical Necessity Clause: This procedure was medically necessary because the lesions that were treated were:
Detail Level: Detailed
Post-Care Instructions: I reviewed with the patient in detail post-care instructions. Patient is to wear sunprotection, and avoid picking at any of the treated lesions. Pt may apply Vaseline to crusted or scabbing areas.
Consent: The patient's consent was obtained including but not limited to risks of crusting, scabbing, blistering, scarring, darker or lighter pigmentary change, recurrence, incomplete removal and infection.
Medical Necessity Information: It is in your best interest to select a reason for this procedure from the list below. All of these items fulfill various CMS LCD requirements except the new and changing color options.
Duration Of Freeze Thaw-Cycle (Seconds): 3

## 2020-11-03 ENCOUNTER — HOSPITAL ENCOUNTER (OUTPATIENT)
Dept: MRI IMAGING | Age: 75
Discharge: HOME | End: 2020-11-03
Attending: PSYCHIATRY & NEUROLOGY
Payer: MEDICARE

## 2020-11-03 DIAGNOSIS — R41.3 MEMORY DIFFICULTIES: ICD-10-CM

## 2020-11-03 PROCEDURE — 70551 MRI BRAIN STEM W/O DYE: CPT

## 2020-11-30 ENCOUNTER — HOSPITAL ENCOUNTER (OUTPATIENT)
Dept: LAB | Age: 75
Discharge: HOME | End: 2020-11-30
Payer: MEDICARE

## 2020-11-30 DIAGNOSIS — R41.3 MEMORY DIFFICULTIES: ICD-10-CM

## 2020-11-30 PROCEDURE — 86376 MICROSOMAL ANTIBODY EACH: CPT

## 2020-11-30 PROCEDURE — 36415 COLL VENOUS BLD VENIPUNCTURE: CPT

## 2020-12-01 LAB — THYROPEROXIDASE AB SERPL-ACNC: <9 IU/ML (ref 0–34)

## 2020-12-02 LAB
THYROGLOB AB SERPL-ACNC: <1 IU/ML (ref 0–0.9)
THYROPEROXIDASE AB SERPL-ACNC: <9 IU/ML (ref 0–34)

## 2020-12-10 ENCOUNTER — HOSPITAL ENCOUNTER (OUTPATIENT)
Dept: PET IMAGING | Age: 75
Discharge: HOME | End: 2020-12-10
Payer: MEDICARE

## 2020-12-10 DIAGNOSIS — R41.3 MEMORY DYSFUNCTION: ICD-10-CM

## 2020-12-10 DIAGNOSIS — R41.3 MEMORY DIFFICULTIES: ICD-10-CM

## 2020-12-10 PROCEDURE — A9552 F18 FDG: HCPCS

## 2020-12-10 RX ORDER — SODIUM CHLORIDE 0.9 % (FLUSH) 0.9 %
10 SYRINGE (ML) INJECTION
Status: COMPLETED | OUTPATIENT
Start: 2020-12-10 | End: 2020-12-10

## 2020-12-10 RX ADMIN — Medication 10 ML: at 07:16

## 2021-07-16 ENCOUNTER — TRANSCRIBE ORDER (OUTPATIENT)
Dept: SCHEDULING | Age: 76
End: 2021-07-16

## 2021-07-16 DIAGNOSIS — Z12.31 ENCOUNTER FOR SCREENING MAMMOGRAM FOR MALIGNANT NEOPLASM OF BREAST: Primary | ICD-10-CM

## 2021-08-16 ENCOUNTER — HOSPITAL ENCOUNTER (OUTPATIENT)
Dept: MAMMOGRAPHY | Age: 76
Discharge: HOME | End: 2021-08-16
Attending: OBSTETRICS & GYNECOLOGY
Payer: MEDICARE

## 2021-08-16 ENCOUNTER — APPOINTMENT (RX ONLY)
Dept: URBAN - METROPOLITAN AREA CLINIC 349 | Facility: CLINIC | Age: 76
Setting detail: DERMATOLOGY
End: 2021-08-16

## 2021-08-16 DIAGNOSIS — D22 MELANOCYTIC NEVI: ICD-10-CM

## 2021-08-16 DIAGNOSIS — L82.1 OTHER SEBORRHEIC KERATOSIS: ICD-10-CM

## 2021-08-16 DIAGNOSIS — Z12.31 ENCOUNTER FOR SCREENING MAMMOGRAM FOR MALIGNANT NEOPLASM OF BREAST: ICD-10-CM

## 2021-08-16 DIAGNOSIS — I78.8 OTHER DISEASES OF CAPILLARIES: ICD-10-CM

## 2021-08-16 DIAGNOSIS — L85.3 XEROSIS CUTIS: ICD-10-CM

## 2021-08-16 PROBLEM — D22.61 MELANOCYTIC NEVI OF RIGHT UPPER LIMB, INCLUDING SHOULDER: Status: ACTIVE | Noted: 2021-08-16

## 2021-08-16 PROBLEM — D22.71 MELANOCYTIC NEVI OF RIGHT LOWER LIMB, INCLUDING HIP: Status: ACTIVE | Noted: 2021-08-16

## 2021-08-16 PROBLEM — M12.9 ARTHROPATHY, UNSPECIFIED: Status: ACTIVE | Noted: 2021-08-16

## 2021-08-16 PROBLEM — D22.5 MELANOCYTIC NEVI OF TRUNK: Status: ACTIVE | Noted: 2021-08-16

## 2021-08-16 PROBLEM — I10 ESSENTIAL (PRIMARY) HYPERTENSION: Status: ACTIVE | Noted: 2021-08-16

## 2021-08-16 PROCEDURE — 77063 BREAST TOMOSYNTHESIS BI: CPT

## 2021-08-16 PROCEDURE — ? COUNSELING

## 2021-08-16 PROCEDURE — ? PRESCRIPTION MEDICATION MANAGEMENT

## 2021-08-16 PROCEDURE — 99213 OFFICE O/P EST LOW 20 MIN: CPT

## 2021-08-16 ASSESSMENT — LOCATION DETAILED DESCRIPTION DERM
LOCATION DETAILED: RIGHT ANTERIOR DISTAL THIGH
LOCATION DETAILED: LEFT CENTRAL MALAR CHEEK
LOCATION DETAILED: RIGHT ULNAR PALM
LOCATION DETAILED: LEFT PROXIMAL DORSAL FOREARM
LOCATION DETAILED: RIGHT PROXIMAL CALF
LOCATION DETAILED: RIGHT PROXIMAL DORSAL FOREARM
LOCATION DETAILED: LEFT ULNAR PALM
LOCATION DETAILED: MIDDLE STERNUM
LOCATION DETAILED: RIGHT SUPERIOR UPPER BACK
LOCATION DETAILED: RIGHT ANTECUBITAL SKIN

## 2021-08-16 ASSESSMENT — LOCATION SIMPLE DESCRIPTION DERM
LOCATION SIMPLE: RIGHT THIGH
LOCATION SIMPLE: LEFT FOREARM
LOCATION SIMPLE: LEFT HAND
LOCATION SIMPLE: RIGHT CALF
LOCATION SIMPLE: RIGHT UPPER BACK
LOCATION SIMPLE: CHEST
LOCATION SIMPLE: RIGHT FOREARM
LOCATION SIMPLE: LEFT CHEEK
LOCATION SIMPLE: RIGHT UPPER ARM
LOCATION SIMPLE: RIGHT HAND

## 2021-08-16 ASSESSMENT — LOCATION ZONE DERM
LOCATION ZONE: FACE
LOCATION ZONE: HAND
LOCATION ZONE: LEG
LOCATION ZONE: ARM
LOCATION ZONE: TRUNK

## 2021-08-16 NOTE — PROCEDURE: MIPS QUALITY
Quality 110: Preventive Care And Screening: Influenza Immunization: Influenza Immunization not Administered due to Patient Allergy.
Detail Level: Zone

## 2021-08-16 NOTE — PROCEDURE: PRESCRIPTION MEDICATION MANAGEMENT
Render In Strict Bullet Format?: No
Detail Level: Zone
Initiate Treatment: Eucerin cream\\nDove for sensitive skin for bathing

## 2022-03-18 PROBLEM — E03.9 PRIMARY HYPOTHYROIDISM: Status: ACTIVE | Noted: 2019-12-06

## 2022-03-19 PROBLEM — M81.0 OSTEOPOROSIS: Status: ACTIVE | Noted: 2017-01-24

## 2022-03-19 PROBLEM — R92.30 DENSE BREAST TISSUE ON MAMMOGRAM: Status: ACTIVE | Noted: 2018-03-22

## 2022-03-19 PROBLEM — R41.3 MEMORY DIFFICULTIES: Status: ACTIVE | Noted: 2018-05-24

## 2022-03-19 PROBLEM — R92.2 DENSE BREAST TISSUE ON MAMMOGRAM: Status: ACTIVE | Noted: 2018-03-22

## 2022-03-29 ENCOUNTER — HOSPITAL ENCOUNTER (OUTPATIENT)
Dept: LAB | Age: 77
Discharge: HOME | End: 2022-03-29
Payer: MEDICARE

## 2022-03-29 PROBLEM — J30.9 ALLERGIC RHINITIS: Status: ACTIVE | Noted: 2022-03-29

## 2022-03-29 PROBLEM — T78.1XXA ADVERSE REACTION TO FOOD: Status: ACTIVE | Noted: 2022-03-29

## 2022-03-29 PROBLEM — H10.45 CHRONIC ALLERGIC CONJUNCTIVITIS: Status: ACTIVE | Noted: 2022-03-29

## 2022-03-29 PROBLEM — R42 VERTIGO: Status: ACTIVE | Noted: 2019-01-26

## 2022-03-29 PROBLEM — J45.20 MILD INTERMITTENT ASTHMA: Status: ACTIVE | Noted: 2022-03-29

## 2022-03-29 PROBLEM — K21.9 GASTRO-ESOPHAGEAL REFLUX DISEASE WITHOUT ESOPHAGITIS: Status: ACTIVE | Noted: 2022-03-29

## 2022-03-29 PROBLEM — L50.1 IDIOPATHIC URTICARIA: Status: ACTIVE | Noted: 2022-03-29

## 2022-03-29 PROBLEM — H81.20 VESTIBULAR NEURITIS: Status: ACTIVE | Noted: 2019-01-28

## 2022-03-29 PROBLEM — J30.1 ALLERGIC RHINITIS DUE TO POLLEN: Status: ACTIVE | Noted: 2022-03-29

## 2022-03-29 PROBLEM — F01.50 VASCULAR DEMENTIA WITHOUT BEHAVIORAL DISTURBANCE (HCC): Status: ACTIVE | Noted: 2022-03-29

## 2022-03-29 LAB
T4 FREE SERPL-MCNC: 1.2 NG/DL (ref 0.78–1.46)
TSH SERPL DL<=0.005 MIU/L-ACNC: 0.8 UIU/ML (ref 0.36–3.74)

## 2022-03-29 PROCEDURE — 84443 ASSAY THYROID STIM HORMONE: CPT

## 2022-03-29 PROCEDURE — 36415 COLL VENOUS BLD VENIPUNCTURE: CPT

## 2022-03-29 PROCEDURE — 84439 ASSAY OF FREE THYROXINE: CPT

## 2022-03-29 NOTE — PROGRESS NOTES
Please call patient with results. TSH and T4 levels are normal. Continue current regimen. Results have been forwarded to Endocrinology.

## 2022-05-20 DIAGNOSIS — E03.9 PRIMARY HYPOTHYROIDISM: Primary | ICD-10-CM

## 2022-05-30 DIAGNOSIS — E03.9 PRIMARY HYPOTHYROIDISM: Primary | ICD-10-CM

## 2022-06-14 ENCOUNTER — OFFICE VISIT (OUTPATIENT)
Dept: FAMILY MEDICINE CLINIC | Facility: CLINIC | Age: 77
End: 2022-06-14
Payer: MEDICARE

## 2022-06-14 VITALS
HEIGHT: 65 IN | OXYGEN SATURATION: 98 % | WEIGHT: 139 LBS | HEART RATE: 60 BPM | TEMPERATURE: 97.5 F | SYSTOLIC BLOOD PRESSURE: 140 MMHG | DIASTOLIC BLOOD PRESSURE: 80 MMHG | BODY MASS INDEX: 23.16 KG/M2

## 2022-06-14 DIAGNOSIS — R45.89 DYSPHORIC MOOD: ICD-10-CM

## 2022-06-14 DIAGNOSIS — E03.9 ACQUIRED HYPOTHYROIDISM: Primary | ICD-10-CM

## 2022-06-14 DIAGNOSIS — R53.83 FATIGUE, UNSPECIFIED TYPE: ICD-10-CM

## 2022-06-14 PROCEDURE — G8420 CALC BMI NORM PARAMETERS: HCPCS | Performed by: FAMILY MEDICINE

## 2022-06-14 PROCEDURE — 1090F PRES/ABSN URINE INCON ASSESS: CPT | Performed by: FAMILY MEDICINE

## 2022-06-14 PROCEDURE — G8399 PT W/DXA RESULTS DOCUMENT: HCPCS | Performed by: FAMILY MEDICINE

## 2022-06-14 PROCEDURE — 99213 OFFICE O/P EST LOW 20 MIN: CPT | Performed by: FAMILY MEDICINE

## 2022-06-14 PROCEDURE — G8427 DOCREV CUR MEDS BY ELIG CLIN: HCPCS | Performed by: FAMILY MEDICINE

## 2022-06-14 PROCEDURE — 1123F ACP DISCUSS/DSCN MKR DOCD: CPT | Performed by: FAMILY MEDICINE

## 2022-06-14 PROCEDURE — 4004F PT TOBACCO SCREEN RCVD TLK: CPT | Performed by: FAMILY MEDICINE

## 2022-06-14 ASSESSMENT — ENCOUNTER SYMPTOMS
GASTROINTESTINAL NEGATIVE: 1
ROS SKIN COMMENTS: DRY SKIN
RESPIRATORY NEGATIVE: 1

## 2022-06-14 NOTE — PROGRESS NOTES
HISTORY OF PRESENT ILLNESS    Suraj Esparza is a 68 y.o. female. HPI  Chief Complaint   Patient presents with    Thyroid Problem     See above. Symptoms of fatigue and dry skin persists. On 2 thyroid supplements from endo. Labs 6 weeks ago were normal. Was prescribed Lexapro and Singulair but has forgotten to take them for at least a month. For a few weeks has had double vision only when watching TV. Does not occur when reading. No headaches. Feels lethargic. No side effects from BP medication. No headache or vision change. Denies chest pain or SOB. No swelling. Current Outpatient Medications on File Prior to Visit   Medication Sig Dispense Refill    albuterol sulfate  (90 Base) MCG/ACT inhaler Inhale into the lungs      amLODIPine (NORVASC) 10 MG tablet Take 10 mg by mouth daily      atorvastatin (LIPITOR) 10 MG tablet TAKE 1 TABLET BY MOUTH DAILY      cetirizine (ZYRTEC) 10 MG tablet Take 10 mg by mouth daily      dicyclomine (BENTYL) 10 MG capsule TAKE 1 CAPSULE BY MOUTH THREE TIMES DAILY AS NEEDED FOR DIARRHEA OR ABDOMINAL CRAMPS      escitalopram (LEXAPRO) 5 MG tablet Take 5 mg by mouth daily      Fluticasone furoate-vilanterol (BREO ELLIPTA) 200-25 MCG/INH AEPB inhaler Inhale 1 puff into the lungs daily      fluticasone (FLONASE) 50 MCG/ACT nasal spray 2 sprays by Nasal route daily      fluticasone (FLOVENT HFA) 110 MCG/ACT inhaler Inhale into the lungs every 12 hours      levothyroxine (SYNTHROID) 50 MCG tablet Take 50 mcg by mouth every morning (before breakfast)      liothyronine (CYTOMEL) 5 MCG tablet Take 5 mcg by mouth daily      montelukast (SINGULAIR) 10 MG tablet Take 10 mg by mouth daily      rivastigmine (EXELON) 4.6 MG/24HR Place 1 patch onto the skin daily       No current facility-administered medications on file prior to visit.      Past Medical History:   Diagnosis Date    Acid reflux     Arrhythmia 2013    palpitations     Arthritis     Asthma     followed by Dr. Cecile Perdomo CAD (coronary artery disease)     hypertension    Essential hypertension 6/13/2016    GERD (gastroesophageal reflux disease)     Followed by GI    Gluten intolerance     H/O echocardiogram 2013    [address] [facility]    H/O exercise stress test 2013    [address] [facility]    Headache     Hearing loss     Hiatal hernia     Hypercholesterolemia     Hypothyroidism 12/6/2019    IBS (irritable bowel syndrome)     Memory difficulties 5/24/2018    Menopause     Osteopenia 10/2/2015    Pap smear for cervical cancer screening 2012    Dr. Viridiana Hernandez Plantar fasciitis     Supraventricular premature beats 6/23/2015       Review of Systems   Constitutional: Positive for activity change (decreased) and fatigue. HENT: Negative. Eyes: Positive for visual disturbance. Respiratory: Negative. Cardiovascular: Negative. Gastrointestinal: Negative. Endocrine: Positive for cold intolerance. Genitourinary: Negative. Musculoskeletal: Negative. Skin:        Dry skin     Neurological: Negative. Psychiatric/Behavioral: Positive for decreased concentration and dysphoric mood. Negative for sleep disturbance and suicidal ideas. Blood pressure (!) 140/80, pulse 60, temperature 97.5 °F (36.4 °C), temperature source Temporal, height 5' 4.5\" (1.638 m), weight 139 lb (63 kg), SpO2 98 %. Physical Exam  Vitals and nursing note reviewed. Constitutional:       Appearance: Normal appearance. HENT:      Mouth/Throat:      Mouth: Mucous membranes are moist.      Pharynx: Oropharynx is clear. Eyes:      Conjunctiva/sclera: Conjunctivae normal.   Neck:      Vascular: No carotid bruit. Cardiovascular:      Rate and Rhythm: Normal rate and regular rhythm. Heart sounds: Normal heart sounds. Pulmonary:      Breath sounds: Normal breath sounds. Musculoskeletal:         General: No swelling or tenderness. Cervical back: Neck supple.    Lymphadenopathy: Cervical: No cervical adenopathy. Neurological:      General: No focal deficit present. Mental Status: She is oriented to person, place, and time. Motor: No weakness. Coordination: Coordination normal.      Gait: Gait normal.   Psychiatric:         Mood and Affect: Mood normal.          ASSESSMENT and PLAN    Annelise Silva was seen today for thyroid problem. Diagnoses and all orders for this visit:    Acquired hypothyroidism  -     [facility] - Thyroid Nodule, [address] Endocrinology    Fatigue, unspecified type    Dysphoric mood       Recheck with endo. Take Lexapro and Singulair regularly. Review response after being on it regularly for at least 4 weeks  Follow up with eye doctor for double vision. Return in about 6 months (around 12/14/2022).     Joseph Olivera MD

## 2022-08-01 ENCOUNTER — OFFICE VISIT (OUTPATIENT)
Dept: FAMILY MEDICINE CLINIC | Facility: CLINIC | Age: 77
End: 2022-08-01
Payer: MEDICARE

## 2022-08-01 VITALS
WEIGHT: 140 LBS | SYSTOLIC BLOOD PRESSURE: 138 MMHG | BODY MASS INDEX: 23.66 KG/M2 | HEART RATE: 76 BPM | TEMPERATURE: 97.6 F | DIASTOLIC BLOOD PRESSURE: 86 MMHG | OXYGEN SATURATION: 96 %

## 2022-08-01 DIAGNOSIS — M54.50 MIDLINE LOW BACK PAIN WITHOUT SCIATICA, UNSPECIFIED CHRONICITY: ICD-10-CM

## 2022-08-01 DIAGNOSIS — M62.830 BACK SPASM: Primary | ICD-10-CM

## 2022-08-01 PROCEDURE — G8399 PT W/DXA RESULTS DOCUMENT: HCPCS | Performed by: FAMILY MEDICINE

## 2022-08-01 PROCEDURE — G8420 CALC BMI NORM PARAMETERS: HCPCS | Performed by: FAMILY MEDICINE

## 2022-08-01 PROCEDURE — G8427 DOCREV CUR MEDS BY ELIG CLIN: HCPCS | Performed by: FAMILY MEDICINE

## 2022-08-01 PROCEDURE — 99213 OFFICE O/P EST LOW 20 MIN: CPT | Performed by: FAMILY MEDICINE

## 2022-08-01 PROCEDURE — 96372 THER/PROPH/DIAG INJ SC/IM: CPT | Performed by: FAMILY MEDICINE

## 2022-08-01 PROCEDURE — 4004F PT TOBACCO SCREEN RCVD TLK: CPT | Performed by: FAMILY MEDICINE

## 2022-08-01 PROCEDURE — 1123F ACP DISCUSS/DSCN MKR DOCD: CPT | Performed by: FAMILY MEDICINE

## 2022-08-01 PROCEDURE — 1090F PRES/ABSN URINE INCON ASSESS: CPT | Performed by: FAMILY MEDICINE

## 2022-08-01 RX ORDER — KETOROLAC TROMETHAMINE 30 MG/ML
30 INJECTION, SOLUTION INTRAMUSCULAR; INTRAVENOUS ONCE
Status: COMPLETED | OUTPATIENT
Start: 2022-08-01 | End: 2022-08-01

## 2022-08-01 RX ORDER — METHYLPREDNISOLONE ACETATE 80 MG/ML
80 INJECTION, SUSPENSION INTRA-ARTICULAR; INTRALESIONAL; INTRAMUSCULAR; SOFT TISSUE ONCE
Status: COMPLETED | OUTPATIENT
Start: 2022-08-01 | End: 2022-08-01

## 2022-08-01 RX ORDER — METAXALONE 800 MG/1
800 TABLET ORAL
Qty: 60 TABLET | Refills: 0 | Status: SHIPPED | OUTPATIENT
Start: 2022-08-01 | End: 2022-09-29 | Stop reason: ALTCHOICE

## 2022-08-01 RX ORDER — MELOXICAM 7.5 MG/1
7.5 TABLET ORAL DAILY PRN
Qty: 40 TABLET | Refills: 0 | Status: SHIPPED | OUTPATIENT
Start: 2022-08-01

## 2022-08-01 RX ADMIN — METHYLPREDNISOLONE ACETATE 80 MG: 80 INJECTION, SUSPENSION INTRA-ARTICULAR; INTRALESIONAL; INTRAMUSCULAR; SOFT TISSUE at 11:58

## 2022-08-01 RX ADMIN — KETOROLAC TROMETHAMINE 30 MG: 30 INJECTION, SOLUTION INTRAMUSCULAR; INTRAVENOUS at 11:57

## 2022-08-01 ASSESSMENT — PATIENT HEALTH QUESTIONNAIRE - PHQ9
SUM OF ALL RESPONSES TO PHQ QUESTIONS 1-9: 0
SUM OF ALL RESPONSES TO PHQ9 QUESTIONS 1 & 2: 0
2. FEELING DOWN, DEPRESSED OR HOPELESS: 0
SUM OF ALL RESPONSES TO PHQ QUESTIONS 1-9: 0
1. LITTLE INTEREST OR PLEASURE IN DOING THINGS: 0

## 2022-08-01 ASSESSMENT — ENCOUNTER SYMPTOMS
RESPIRATORY NEGATIVE: 1
BACK PAIN: 1
GASTROINTESTINAL NEGATIVE: 1

## 2022-08-01 NOTE — PROGRESS NOTES
HISTORY OF PRESENT ILLNESS    Nicko Cabrera is a 68 y.o. female. HPI  Chief Complaint   Patient presents with    Back Pain     Bra line x2 weeks     See above. Has been stable on maintenance medications. Onset 2 weeks ago of severe mid back pain. No injury. Does not recall unusual activity. Pain was severe enough to make it difficult to stand. Has improved in the last day but still has constant pain. Pain increases with any activity. No pain with deep breath. Pain is in the area of her bra line. No radiation. No numbness or weakness. No rash. Current Outpatient Medications on File Prior to Visit   Medication Sig Dispense Refill    albuterol sulfate  (90 Base) MCG/ACT inhaler Inhale into the lungs      amLODIPine (NORVASC) 10 MG tablet Take 10 mg by mouth daily      atorvastatin (LIPITOR) 10 MG tablet TAKE 1 TABLET BY MOUTH DAILY      cetirizine (ZYRTEC) 10 MG tablet Take 10 mg by mouth daily      dicyclomine (BENTYL) 10 MG capsule TAKE 1 CAPSULE BY MOUTH THREE TIMES DAILY AS NEEDED FOR DIARRHEA OR ABDOMINAL CRAMPS      escitalopram (LEXAPRO) 5 MG tablet Take 5 mg by mouth daily      Fluticasone furoate-vilanterol (BREO ELLIPTA) 200-25 MCG/INH AEPB inhaler Inhale 1 puff into the lungs daily      fluticasone (FLONASE) 50 MCG/ACT nasal spray 2 sprays by Nasal route daily      fluticasone (FLOVENT HFA) 110 MCG/ACT inhaler Inhale into the lungs every 12 hours      levothyroxine (SYNTHROID) 50 MCG tablet Take 50 mcg by mouth every morning (before breakfast)      liothyronine (CYTOMEL) 5 MCG tablet Take 5 mcg by mouth daily      montelukast (SINGULAIR) 10 MG tablet Take 10 mg by mouth daily      rivastigmine (EXELON) 4.6 MG/24HR Place 1 patch onto the skin daily       No current facility-administered medications on file prior to visit.      Past Medical History:   Diagnosis Date    Acid reflux     Arrhythmia 2013    palpitations     Arthritis     Asthma     followed by Dr. Bill Sesay     CAD (coronary artery disease)     hypertension    Essential hypertension 6/13/2016    GERD (gastroesophageal reflux disease)     Followed by GI    Gluten intolerance     H/O echocardiogram 2013    [facility]    H/O exercise stress test 2013    [facility]    Headache     Hearing loss     Hiatal hernia     Hypercholesterolemia     Hypothyroidism 12/6/2019    IBS (irritable bowel syndrome)     Memory difficulties 5/24/2018    Menopause     Osteopenia 10/2/2015    Pap smear for cervical cancer screening 2012    Dr. Phyllis Donis    Plantar fasciitis     Supraventricular premature beats 6/23/2015       Review of Systems   Constitutional: Negative. Respiratory: Negative. Cardiovascular: Negative. Gastrointestinal: Negative. Genitourinary: Negative. Musculoskeletal:  Positive for back pain. Neurological: Negative. Blood pressure 138/86, pulse 76, temperature 97.6 °F (36.4 °C), temperature source Temporal, weight 140 lb (63.5 kg), SpO2 96 %. Physical Exam  Vitals and nursing note reviewed. Constitutional:       Appearance: Normal appearance. HENT:      Mouth/Throat:      Mouth: Mucous membranes are moist.      Pharynx: Oropharynx is clear. Eyes:      Conjunctiva/sclera: Conjunctivae normal.   Neck:      Vascular: No carotid bruit. Cardiovascular:      Rate and Rhythm: Normal rate and regular rhythm. Heart sounds: Normal heart sounds. Pulmonary:      Breath sounds: Normal breath sounds. Musculoskeletal:         General: No swelling. Cervical back: Neck supple. Comments: Moderate non-tender spasm in para-spinous muscles of the thoracic area. Mild tenderness with hyperextension. Worse with flexion at waist and with rotation of torso. Lymphadenopathy:      Cervical: No cervical adenopathy.    Neurological:      Deep Tendon Reflexes: Reflexes normal.   Psychiatric:         Mood and Affect: Mood normal.        ASSESSMENT and PLAN    Annia Born was seen today for back pain.    Diagnoses and all orders for this visit:    Back spasm  -     methylPREDNISolone acetate (DEPO-MEDROL) injection 80 mg  -     metaxalone (SKELAXIN) 800 MG tablet; Take 1 tablet by mouth every 8-12 hours as needed (muscle spasms)    Midline low back pain without sciatica, unspecified chronicity  -     [facility]    Other orders  -     ketorolac (TORADOL) injection 30 mg  -     meloxicam (MOBIC) 7.5 MG tablet; Take 1 tablet by mouth daily as needed for Pain   Note initial BP elevation decreased on recheck by me  Explained that exam indicates spasm as cause of pain. Schedule PT. Follow up immediately if symptoms worsen. Return in about 10 days (around 8/11/2022), or if symptoms worsen or fail to improve.     Katherine Kilpatrick MD

## 2022-08-03 NOTE — TELEPHONE ENCOUNTER
Metaxalone 800 not covered by insurance.     Alternatives are   Cyclobenzaprine HCLER  Methocarbamol

## 2022-08-05 ENCOUNTER — NURSE ONLY (OUTPATIENT)
Dept: ENDOCRINOLOGY | Age: 77
End: 2022-08-05

## 2022-08-05 DIAGNOSIS — E03.9 PRIMARY HYPOTHYROIDISM: ICD-10-CM

## 2022-08-06 RX ORDER — CYCLOBENZAPRINE HCL 10 MG
5-10 TABLET ORAL EVERY 8 HOURS PRN
Qty: 40 TABLET | Refills: 0 | Status: SHIPPED | OUTPATIENT
Start: 2022-08-06 | End: 2022-08-19

## 2022-08-08 ENCOUNTER — HOSPITAL ENCOUNTER (OUTPATIENT)
Dept: PHYSICAL THERAPY | Age: 77
Setting detail: RECURRING SERIES
Discharge: HOME | End: 2022-08-11
Payer: MEDICARE

## 2022-08-08 PROCEDURE — 97140 MANUAL THERAPY 1/> REGIONS: CPT

## 2022-08-08 PROCEDURE — 97162 PT EVAL MOD COMPLEX 30 MIN: CPT

## 2022-08-08 ASSESSMENT — PAIN SCALES - GENERAL: PAINLEVEL_OUTOF10: 4

## 2022-08-08 NOTE — THERAPY EVALUATION
Martir Lewis  : 1945  Primary: Medicare Part A And B  Secondary: [address] @ [address] [address]  Phone: [phone]  Fax: [phone] Plan Frequency: 2x/wk for 90 days  Plan of Care/Certification Expiration Date: 22    PT Visit Info: Total # of Visits to Date: 1  Progress Note Counter: 1    Visit Count:  1    OUTPATIENT PHYSICAL THERAPY:OP NOTE TYPE: Initial Assessment 2022               Episode  Appt Desk         Treatment Diagnosis:  Low Back Pain (M54.5)  Medical/Referring Diagnosis:  No admission diagnoses are documented for this encounter. Referring Physician:  Yuli Lima MD MD Orders:  PT Eval and Treat   Return MD Appt:  08-15-22  Date of Onset:  2022  Allergies:  Codeine, Morphine, Amoxicillin-pot clavulanate, Ciprofloxacin, Meperidine, Sulfamethoxazole-trimethoprim, Telithromycin, Alendronate, Cefdinir, Cephalosporins, Esomeprazole magnesium, Gluten meal, Levothyroxine, Loperamide, Promethazine, and Sulfa antibiotics  Restrictions/Precautions:    No data recordedNo data recorded   Medications Last Reviewed:  2022     SUBJECTIVE   History of Injury/Illness (Reason for Referral):  Pt reports insidious onset back pain of approximately 2-3 weeks duration. She states she went to bed one night and she couldn't sleep because her back was hurting so bad. She states the pain started on her R side, went to the center and is now mainly on the L side of her back. She states the pain is worse first thing in the morning. She currently c/o pain in her upper back/scapular region. She rates her current back pain 4/10 sitting at rest, increasing to 6/10 at worst over the past 2 days. She is taking a pain medication which she states doesn't help. She states she is taking Tylenol which is more helpful. Pt has had no diagnostic testing of her back. She is R-handed.   She patient/guardian prefer to learn new concepts?: Demonstration; Pictures/Videos   Fall Risk Scale: Total Score: 0  Dowling Fall Risk: Low (0-24)   Dominant Side:  right handed  Other Clinical Tests:  No diagnostic testing of spine per patient  Personal Factors:        Sex:  female        Age:  68 y.o. Profession:  Pt is retired      OBJECTIVE   Observations: Forward head, rounded shoulders  Ambulation/WC:     No significant deficits noted  Functional Mobility:    Pt able to transition sit to supine and supine to sit modified independent   Dermatomes:   Sensation intact to light touch B UE's/LE's  Myotomes:   Mild weakness bilateral shoulders and hips  Reflexes:   DTR's 2+ to bilateral brachioradialis, biceps, triceps, patellar and achilles  Lumbar:   ROM:  Lumbar Flexion = 50 degrees  Lumbar Extension = 25 degrees  Lumbar Side Bending R = 22 degrees (sore in upper back)  Lumbar Side Bending L = 25 degrees    Strength:  R shoulder flexion =  4/5  R shoulder abduction = 4/5  R elbow flexion = 5/5  R elbow extension = 5/5  R wrist flexion = 5/5  R wrist extension = 5/5    L shoulder flexion = 4/5  L shoulder abduction = 4/5  L elbow flexion = 5/5  L elbow extension = 5/5  L wrist flexion = 5/5  L wrist extension = 5/5    R hip flexion = 4/5  R hip abduction = 4/5  R hip adduction = 5/5  R knee extension = 5/5  R knee flexion = 5/5  R ankle dorsiflexion = 5/5  R ankle plantarflexion = 5/5    L hip flexion = 4/5  L hip abduction = 4/5  L hip adduction = 5/5  L knee extension = 5/5  L knee flexion = 5/5  L ankle dorsiflexion = 5/5  L ankle plantarflexion = 5/5    Palpation:  Tenderness bilateral rhomboids      ASSESSMENT   Initial Assessment:  Pt presents with decreased lumbar ROM, decreased LE strength/flexibility and increased pain leading to decreased functional status. Pt would benefit from skilled physical therapy services to address the above deficits and help patient return to prior level of function. Problem List: (Impacting functional limitations): Body Structures, Functions, Activity Limitations Requiring Skilled Therapeutic Intervention: Decreased functional mobility ; Decreased ROM; Decreased strength; Increased pain   Therapy Prognosis:   Therapy Prognosis: Good   Assessment Complexity:   Medium Complexity  PLAN   Effective Dates: 08-08-22 TO Plan of Care/Certification Expiration Date: 11/06/22   Frequency/Duration: Plan Frequency: 2x/wk for 90 days   Interventions Planned (Treatment may consist of any combination of the following):    Current Treatment Recommendations: Strengthening; ROM; Manual Therapy - Soft Tissue Mobilization; Manual Therapy - Joint Manipulation; Pain management; Home exercise program; Patient/Caregiver education & training; Modalities; Aquatics   Goals: (Goals have been discussed and agreed upon with patient.)  Short-Term Functional Goals: Time Frame: 4 weeks  Pt will increase lumbar ROM flexion = 60 degrees to assist with daily activities  Pt will increase strength bilateral shoulders/hips 4+/5 to assist with daily activities  Pt will be independent with HEP  Discharge Goals: Time Frame: 12 weeks  Pt will increase strength bilateral shoulders/hips 5/5 to assist with daily activities  Pt will have improved Modified Oswestry Low Back Pain Questionnaire score to 10 or below to show overall improvement in function  Pt will perform 15 minutes light household/lawn activities independently with min to no c/o back pain         Outcome Measure: Tool Used: Modified Oswestry Low Back Pain Questionnaire  Score:  Initial: 13/50  Most Recent: X/50 (Date: -- )   Interpretation of Score: Each section is scored on a 0-5 scale, 5 representing the greatest disability. The scores of each section are added together for a total score of 50. Medical Necessity:   > Patient demonstrates good rehab potential due to higher previous functional level.   Reason For Services/Other Comments:  > Patient continues to require skilled intervention due to decreased lumbar ROM, decreased LE strength/flexibility and increased pain leading to decreased functional status. Total Duration:  45 minutes  Time In: 1430  Time Out: 1329    Regarding Santy Camilo Rodriguez's therapy, I certify that the treatment plan above will be carried out by a therapist or under their direction.   Thank you for this referral,  Flex Gary, PT     Referring Physician Signature: Yuli Mejia., * _______________________________ Date _____________        Post Session Pain  Charge Capture  PT Visit Info MD Guidelines  MyChart

## 2022-08-08 NOTE — PROGRESS NOTES
Anabel Mann  : 1945  Primary: Medicare Part A And B  Secondary: [address] @ [address] [address]  Phone: [phone]  Fax: [phone] Plan Frequency: 2x/wk for 90 days  Plan of Care/Certification Expiration Date: 22    PT Visit Info: Total # of Visits to Date: 1  Progress Note Counter: 1   Visit Count:  1   OUTPATIENT PHYSICAL THERAPY:OP NOTE TYPE: Treatment Note 2022       Episode  }Appt Desk             Treatment Diagnosis:  Low Back Pain (M54.5)  Medical/Referring Diagnosis:  No admission diagnoses are documented for this encounter. Referring Physician:  Valery Zamarripa MD MD Orders:  PT Eval and Treat   Date of Onset:  2022  Allergies:   Codeine, Morphine, Amoxicillin-pot clavulanate, Ciprofloxacin, Meperidine, Sulfamethoxazole-trimethoprim, Telithromycin, Alendronate, Cefdinir, Cephalosporins, Esomeprazole magnesium, Gluten meal, Levothyroxine, Loperamide, Promethazine, and Sulfa antibiotics  Restrictions/Precautions:  No data recordedNo data recorded   Interventions Planned (Treatment may consist of any combination of the following):    Current Treatment Recommendations: Strengthening; ROM; Manual Therapy - Soft Tissue Mobilization; Manual Therapy - Joint Manipulation; Pain management; Home exercise program; Patient/Caregiver education & training; Modalities; Aquatics   Subjective Comments:  Pt reports upper back pain. Initial:}    4/10Post Session:       4/10  Medications Last Reviewed:  2022  Updated Objective Findings:  See evaluation note from today  Treatment   THERAPEUTIC EXERCISE: (5 minutes):    Exercises per grid below to improve mobility and strength. Required minimal verbal cues to promote proper body alignment and promote proper body mechanics. Progressed resistance and repetitions as indicated. MANUAL THERAPY: (15 minutes):    Soft tissue mobilization was utilized and necessary because of the patient's restricted motion of soft tissue. To bilateral thoracic paraspinals and rhomboids in side lying. Date:  08-08-22 Date:   Date:     Activity/Exercise Parameters Parameters Parameters   Theraband Rows Yellow T-band  15 reps  (HEP)     Theraband Straight Arm Pulldowns Yellow T-band  15 reps  (HEP)                                       Treatment/Session Summary:    Treatment Assessment:  Pt tolerated all treatments well today with no c/o. Communication/Consultation:  None today  Equipment provided today:  None  Recommendations/Intent for next treatment session: Next visit will focus on manual therapy, progress therex as tolerable.     Total Treatment Billable Duration:  15 minutes  Time In: 1430  Time Out: 107 [facility], PT       Charge Capture  }Post Session Pain  PT Visit Info  [employer] Portal  MD Guidelines  Scanned Media  Benefits  MyChart    Future Appointments   Date Time Provider Renita Dubois   8/10/2022 10:15 AM Pittsburghjb Magana, PTA [facility] SFE   8/15/2022 11:30 AM True Murry MD [facility] GVL AMB   8/15/2022  1:00 PM Kendy Jiang, PT [facility] SFE   8/17/2022 10:30 AM SFE MAR BI ROOM 4 [facility]MAM SFE   8/18/2022  9:30 AM Kishan Lamb [employer], PTA [facility]PT SFE   8/22/2022  1:00 PM Kendy Jiang, PT [facility]FE[facility]PT SFE   8/25/2022  1:45 PM Milas Adonis [employer]ger, PTA SFEORPT SFE   9/12/2022  9:45 AM True Murry MD [facility]VL AMB   9/29/2022 11:00 AM HELENA Strickland BSND GVL AMB   10/10/2022  9:45 AM Elvira Gavin MD [facility] GVL AMB   10/26/2022 11:40 AM ENDO LAB END GVL AMB   10/31/2022  9:40 AM Renuka Ayala MD [facility] GVL AMB

## 2022-08-10 ENCOUNTER — HOSPITAL ENCOUNTER (OUTPATIENT)
Dept: PHYSICAL THERAPY | Age: 77
Setting detail: RECURRING SERIES
Discharge: HOME | End: 2022-08-13
Payer: MEDICARE

## 2022-08-10 PROCEDURE — 97110 THERAPEUTIC EXERCISES: CPT

## 2022-08-10 PROCEDURE — 97140 MANUAL THERAPY 1/> REGIONS: CPT

## 2022-08-10 ASSESSMENT — PAIN SCALES - GENERAL: PAINLEVEL_OUTOF10: 4

## 2022-08-10 NOTE — PROGRESS NOTES
Lesly Rincon  : 1945  Primary: Medicare Part A And B  Secondary: [address] @ [facility]  [place] [address]  Phone: [phone]  Fax: [phone] Plan Frequency: 2x/wk for 90 days  Plan of Care/Certification Expiration Date: 22      PT Visit Info: Total # of Visits to Date: 2  Progress Note Counter: 2     Visit Count:  2   OUTPATIENT PHYSICAL THERAPY:OP NOTE TYPE: Treatment Note 8/10/2022       Episode  }Appt Desk             Treatment Diagnosis:  Low Back Pain (M54.5) Complaints more of mid back  Medical/Referring Diagnosis:  No admission diagnoses are documented for this encounter. Referring Physician:  Disha Judd MD MD Orders:  PT Eval and Treat   Date of Onset:  2022  Allergies:   Codeine, Morphine, Amoxicillin-pot clavulanate, Ciprofloxacin, Meperidine, Sulfamethoxazole-trimethoprim, Telithromycin, Alendronate, Cefdinir, Cephalosporins, Esomeprazole magnesium, Gluten meal, Levothyroxine, Loperamide, Promethazine, and Sulfa antibiotics  Restrictions/Precautions:  No data recordedNo data recorded   Interventions Planned (Treatment may consist of any combination of the following):    Current Treatment Recommendations: Strengthening; ROM; Manual Therapy - Soft Tissue Mobilization; Manual Therapy - Joint Manipulation; Pain management; Home exercise program; Patient/Caregiver education & training; Modalities; Aquatics     Subjective Comments: It is still hurting, depending on what i am doing  \"Sometimes it wraps around to my ribs, depending on what I am doing\"  Initial:}    4/10Post Session:       4/10  Medications Last Reviewed:  8/10/2022  Updated Objective Findings:  None Today  Treatment   THERAPEUTIC EXERCISE: (30 minutes):    Exercises per grid below to improve mobility and strength. Required minimal verbal cues to promote proper body alignment and promote proper body mechanics. Progressed resistance and repetitions as indicated. MANUAL THERAPY: (10 minutes): Soft tissue mobilization was utilized and necessary because of the patient's restricted motion of soft tissue. To bilateral thoracic paraspinals and rhomboids in side lying. Date:  08-10-22 Date:   Date:     Activity/Exercise Parameters Parameters Parameters   Theraband Rows Yellow T-band  15 reps  (HEP)     Theraband Straight Arm Pulldowns Yellow T-band  15 reps  (HEP)     Lower Trunk Rotation X 10 reps     Active Hamstring Stretch X 20 pumps bilaterally     Pelvic Tilts X 15 reps      Raffaele pose X 10 reps     Cat/Camel X 10 reps     SKTC 3 x 20 sec holds               Treatment/Session Summary:    Treatment Assessment:  Patient was able to complete tx-added new stretches with HEP  Communication/Consultation:  None today  Equipment provided today:  None  Recommendations/Intent for next treatment session: Next visit will focus on manual therapy, progress therex as tolerable.     Total Treatment Billable Duration:   40 minutes  Time In: 9231  Time Out: Bronson De Mark 7287, PTA       Charge Capture  }Post Session Pain  PT Visit Info  [employer] Portal  MD Guidelines  Scanned Media  Benefits  MyChart    Future Appointments   Date Time Provider [place]   8/15/2022 11:30 AM Kiya North MD [facility] GVL AMB   8/15/2022  1:00 PM Lucrecia Connelly, PT [facility] SFE   8/17/2022 10:30 AM SFE [facility] ROOM 4 [facility]ERMAM SFE   8/18/2022  9:30 AM Arianna Myles, PTA [facility] SFE   8/22/2022  1:00 PM Lucrecia Connelly PT SFEORPT SFE   8/25/2022  1:45 PM Arianna Myles, PTA SFEORPT SFE   9/12/2022  9:45 AM Kiya North MD [facility] GVL AMB   9/29/2022 11:00 AM HELENA Brenner BSND GVL AMB   10/10/2022  9:45 AM Mercedes Jose MD [facility] GVL AMB   10/26/2022 11:40 AM ENDO LAB END GVL AMB   10/31/2022  9:40 AM Kyle Shanks MD END GVL AMB

## 2022-08-15 ENCOUNTER — OFFICE VISIT (OUTPATIENT)
Dept: FAMILY MEDICINE CLINIC | Facility: CLINIC | Age: 77
End: 2022-08-15
Payer: MEDICARE

## 2022-08-15 VITALS
BODY MASS INDEX: 23.83 KG/M2 | TEMPERATURE: 97.6 F | WEIGHT: 141 LBS | HEART RATE: 60 BPM | SYSTOLIC BLOOD PRESSURE: 132 MMHG | OXYGEN SATURATION: 99 % | DIASTOLIC BLOOD PRESSURE: 74 MMHG

## 2022-08-15 DIAGNOSIS — M62.830 BACK SPASM: Primary | ICD-10-CM

## 2022-08-15 PROCEDURE — G8420 CALC BMI NORM PARAMETERS: HCPCS | Performed by: FAMILY MEDICINE

## 2022-08-15 PROCEDURE — 1090F PRES/ABSN URINE INCON ASSESS: CPT | Performed by: FAMILY MEDICINE

## 2022-08-15 PROCEDURE — G8427 DOCREV CUR MEDS BY ELIG CLIN: HCPCS | Performed by: FAMILY MEDICINE

## 2022-08-15 PROCEDURE — 4004F PT TOBACCO SCREEN RCVD TLK: CPT | Performed by: FAMILY MEDICINE

## 2022-08-15 PROCEDURE — 99213 OFFICE O/P EST LOW 20 MIN: CPT | Performed by: FAMILY MEDICINE

## 2022-08-15 PROCEDURE — G8399 PT W/DXA RESULTS DOCUMENT: HCPCS | Performed by: FAMILY MEDICINE

## 2022-08-15 PROCEDURE — 1123F ACP DISCUSS/DSCN MKR DOCD: CPT | Performed by: FAMILY MEDICINE

## 2022-08-15 ASSESSMENT — ENCOUNTER SYMPTOMS
BACK PAIN: 1
RESPIRATORY NEGATIVE: 1
GASTROINTESTINAL NEGATIVE: 1

## 2022-08-15 ASSESSMENT — PATIENT HEALTH QUESTIONNAIRE - PHQ9
SUM OF ALL RESPONSES TO PHQ9 QUESTIONS 1 & 2: 0
2. FEELING DOWN, DEPRESSED OR HOPELESS: 0
1. LITTLE INTEREST OR PLEASURE IN DOING THINGS: 0
SUM OF ALL RESPONSES TO PHQ QUESTIONS 1-9: 0

## 2022-08-15 NOTE — PROGRESS NOTES
HISTORY OF PRESENT ILLNESS    Willis Torre is a 68 y.o. female. HPI  Chief Complaint   Patient presents with    Follow-up     15 day follow up    Joint Swelling     Left ankle     See above. States that back pain is improved by about 60%. Severity of 4/10 today. Responding to therapy and muscle relaxant. Remains localized to the left mid back with no radiation. Occasionally hurts with deep breath or cough. No exertional symptoms. Clarifies that left ankle swelling is resolved. Noted it about 2 weeks ago. No pain or SOB. Improves with elevation. None noted in the last 2 days.     Current Outpatient Medications on File Prior to Visit   Medication Sig Dispense Refill    cyclobenzaprine (FLEXERIL) 10 MG tablet Take 0.5-1 tablets by mouth every 8 hours as needed for Muscle spasms 40 tablet 0    meloxicam (MOBIC) 7.5 MG tablet Take 1 tablet by mouth daily as needed for Pain 40 tablet 0    metaxalone (SKELAXIN) 800 MG tablet Take 1 tablet by mouth every 8-12 hours as needed (muscle spasms) 60 tablet 0    albuterol sulfate  (90 Base) MCG/ACT inhaler Inhale into the lungs      amLODIPine (NORVASC) 10 MG tablet Take 10 mg by mouth daily      atorvastatin (LIPITOR) 10 MG tablet TAKE 1 TABLET BY MOUTH DAILY      cetirizine (ZYRTEC) 10 MG tablet Take 10 mg by mouth daily      dicyclomine (BENTYL) 10 MG capsule TAKE 1 CAPSULE BY MOUTH THREE TIMES DAILY AS NEEDED FOR DIARRHEA OR ABDOMINAL CRAMPS      escitalopram (LEXAPRO) 5 MG tablet Take 5 mg by mouth daily      Fluticasone furoate-vilanterol (BREO ELLIPTA) 200-25 MCG/INH AEPB inhaler Inhale 1 puff into the lungs daily      fluticasone (FLONASE) 50 MCG/ACT nasal spray 2 sprays by Nasal route daily      fluticasone (FLOVENT HFA) 110 MCG/ACT inhaler Inhale into the lungs every 12 hours      levothyroxine (SYNTHROID) 50 MCG tablet Take 50 mcg by mouth every morning (before breakfast)      liothyronine (CYTOMEL) 5 MCG tablet Take 5 mcg by mouth daily      montelukast (SINGULAIR) 10 MG tablet Take 10 mg by mouth daily      rivastigmine (EXELON) 4.6 MG/24HR Place 1 patch onto the skin daily       No current facility-administered medications on file prior to visit. Past Medical History:   Diagnosis Date    Acid reflux     Arrhythmia 2013    palpitations     Arthritis     Asthma     followed by Dr. Jluis Sullivan    Bronchitis     CAD (coronary artery disease)     hypertension    Essential hypertension 6/13/2016    GERD (gastroesophageal reflux disease)     Followed by GI    Gluten intolerance     H/O echocardiogram 2013    [address] [place] [address] [facility]iology    H/O exercise stress test 2013    [address] [place] [address] Cardiology    Headache     Hearing loss     Hiatal hernia     Hypercholesterolemia     Hypothyroidism 12/6/2019    IBS (irritable bowel syndrome)     Memory difficulties 5/24/2018    Menopause     Osteopenia 10/2/2015    Pap smear for cervical cancer screening 2012    Dr. Katalina Olmedo    Plantar fasciitis     Supraventricular premature beats 6/23/2015       Review of Systems   Constitutional: Negative. Respiratory: Negative. Cardiovascular: Negative. Gastrointestinal: Negative. Genitourinary: Negative. Musculoskeletal:  Positive for back pain (resolving). Neurological: Negative. Blood pressure 132/74, pulse 60, temperature 97.6 °F (36.4 °C), temperature source Temporal, weight 141 lb (64 kg), SpO2 99 %. Physical Exam  Vitals and nursing note reviewed. Constitutional:       Appearance: Normal appearance. HENT:      Mouth/Throat:      Mouth: Mucous membranes are moist.      Pharynx: Oropharynx is clear. Eyes:      Conjunctiva/sclera: Conjunctivae normal.   Neck:      Vascular: No carotid bruit. Cardiovascular:      Rate and Rhythm: Normal rate and regular rhythm. Heart sounds: Normal heart sounds. Pulmonary:      Breath sounds: Normal breath sounds. Musculoskeletal:         General: No swelling. Cervical back: Neck supple.       Comments: Back has mild non-tender spasm in the left mid thoracic area. Flexion, extension and rotation is intact./   Lymphadenopathy:      Cervical: No cervical adenopathy. Neurological:      Coordination: Coordination normal.      Gait: Gait normal.      Deep Tendon Reflexes: Reflexes normal.   Psychiatric:         Mood and Affect: Mood normal.        ASSESSMENT and PLAN    Patricia Last was seen today for follow-up and joint swelling. Diagnoses and all orders for this visit:    Back spasm   Continue current measures. Follow up if symptoms do not continue to improve or if further symptoms develop. Return in about 3 months (around 11/15/2022), or if symptoms worsen or fail to improve.     Neel Sanchez MD

## 2022-08-17 ENCOUNTER — HOSPITAL ENCOUNTER (OUTPATIENT)
Dept: MAMMOGRAPHY | Age: 77
Discharge: HOME | End: 2022-08-20
Payer: MEDICARE

## 2022-08-17 DIAGNOSIS — Z12.31 ENCOUNTER FOR SCREENING MAMMOGRAM FOR MALIGNANT NEOPLASM OF BREAST: ICD-10-CM

## 2022-08-17 PROCEDURE — 77063 BREAST TOMOSYNTHESIS BI: CPT

## 2022-08-18 ENCOUNTER — HOSPITAL ENCOUNTER (OUTPATIENT)
Dept: PHYSICAL THERAPY | Age: 77
Setting detail: RECURRING SERIES
Discharge: HOME | End: 2022-08-21
Payer: MEDICARE

## 2022-08-18 PROCEDURE — 97110 THERAPEUTIC EXERCISES: CPT

## 2022-08-18 PROCEDURE — 97140 MANUAL THERAPY 1/> REGIONS: CPT

## 2022-08-18 ASSESSMENT — PAIN SCALES - GENERAL: PAINLEVEL_OUTOF10: 3

## 2022-08-18 NOTE — PROGRESS NOTES
Twan Valle  : 1945  Primary: Medicare Part A And B  Secondary: [address] @ [address] [address]  Phone: [phone]  Fax: [phone] Plan Frequency: 2x/wk for 90 days  Plan of Care/Certification Expiration Date: 22      PT Visit Info: Total # of Visits to Date: 3  Progress Note Counter: 3     Visit Count:  3   OUTPATIENT PHYSICAL THERAPY:OP NOTE TYPE: Treatment Note 2022       Episode  }Appt Desk             Treatment Diagnosis:  Low Back Pain (M54.5) Complaints more of mid back  Medical/Referring Diagnosis:  No admission diagnoses are documented for this encounter. Referring Physician:  Dasia Ritchie MD MD Orders:  PT Eval and Treat   Date of Onset:  2022  Allergies:   Codeine, Morphine, Amoxicillin-pot clavulanate, Ciprofloxacin, Meperidine, Sulfamethoxazole-trimethoprim, Telithromycin, Alendronate, Cefdinir, Cephalosporins, Esomeprazole magnesium, Gluten meal, Levothyroxine, Loperamide, Promethazine, and Sulfa antibiotics  Restrictions/Precautions:  No data recordedNo data recorded   Interventions Planned (Treatment may consist of any combination of the following):    Current Treatment Recommendations: Strengthening; ROM; Manual Therapy - Soft Tissue Mobilization; Manual Therapy - Joint Manipulation; Pain management; Home exercise program; Patient/Caregiver education & training; Modalities; Aquatics     Subjective Comments: \"The MD told me to do some postural exercises\"    Initial:}    3/10Post Session:       3/10  Medications Last Reviewed:  2022  Updated Objective Findings:  None Today  Treatment   THERAPEUTIC EXERCISE: (30 minutes):    Exercises per grid below to improve mobility and strength. Required minimal verbal cues to promote proper body alignment and promote proper body mechanics. Progressed resistance and repetitions as indicated.     MANUAL THERAPY: (10 minutes): Soft tissue mobilization was utilized and necessary because of the patient's restricted motion of soft tissue. To bilateral thoracic paraspinals and rhomboids in side lying. Date:  08-18-22 Date:   Date:     Activity/Exercise Parameters Parameters Parameters   Theraband Rows red T-band  15 reps  (HEP)     Theraband Straight Arm Pulldowns red T-band  15 reps  (HEP)     Lower Trunk Rotation X 10 reps     Active Hamstring Stretch X 20 pumps bilaterally     Pelvic Tilts X 15 reps      Raffaele pose X 10 reps     Cat/Camel X 10 reps     Piriformis stretch 3 x 20 sec hols           SKTC 3 x 20 sec holds               Treatment/Session Summary:    Treatment Assessment:  Patient was able to complete tx-constant vc's on correct way to do the stretches- mid back discomfort  Communication/Consultation:  None today  Equipment provided today:  None  Recommendations/Intent for next treatment session: Next visit will focus on manual therapy, progress therex as tolerable.     Total Treatment Billable Duration:   40 minutes  Time In: 0930  Time Out: [address], PTA       Charge Capture  }Post Session Pain  PT Visit Info  [employer] Portal  MD Guidelines  Scanned Media  Benefits  MyChart    Future Appointments   Date Time Provider [place]   8/22/2022  1:00 PM Telma Cruz, PT [facility]   8/25/2022  1:45 PM Aicha Myles PTA [facility]   9/12/2022  9:45 AM Jasper Rivera MD [facility]   [facility]   10/10/2022  9:45 AM Sharmaine Ramirez MD [facility]   10/26/2022 11:40 AM ENDO LAB END [facility]   10/31/2022  9:40 AM Zeferino Butcher MD END [facility]

## 2022-08-31 ENCOUNTER — NURSE TRIAGE (OUTPATIENT)
Dept: OTHER | Facility: CLINIC | Age: 77
End: 2022-08-31

## 2022-08-31 ENCOUNTER — TELEPHONE (OUTPATIENT)
Dept: FAMILY MEDICINE CLINIC | Facility: CLINIC | Age: 77
End: 2022-08-31

## 2022-08-31 NOTE — TELEPHONE ENCOUNTER
See encounter I advised patient to go to ER and please call us afterwards for update. Her left ankle is swollen and red, pt. understood and will call with update after ER visit.

## 2022-08-31 NOTE — TELEPHONE ENCOUNTER
Received call from Trevor Barlow at [facility] with [employer]. Subjective: Caller states \"Left ankle swelling\"     Current Symptoms:   Left ankle only  Reports redness that is worsening. No known injury  Now difficult to walk on it, limping. Noticed increased thirst x 3 days    Onset: Started Sat, worsening    Associated Symptoms: reduced activity, eating and drinking normally    Pain Severity: 3/10, when walking 4/10    Temperature: Denies    What has been tried: Voltarin cream    LMP: NA Pregnant: NA    Recommended disposition: Go to [facility]    Care advice provided, patient verbalizes understanding; denies any other questions or concerns; instructed to call back for any new or worsening symptoms. Patient/Caller agrees with recommended disposition; writer provided warm transfer to [address] [place] [address] at [facility] for appointment scheduling     Attention Provider: Thank you for allowing me to participate in the care of your patient. The patient was connected to triage in response to information provided to the ECC/PSC. Please do not respond through this encounter as the response is not directed to a shared pool. Reason for Disposition   Redness and painful when touched and no fever    Protocols used:  Ankle Swelling-ADULT-[place]

## 2022-09-12 ENCOUNTER — OFFICE VISIT (OUTPATIENT)
Dept: FAMILY MEDICINE CLINIC | Facility: CLINIC | Age: 77
End: 2022-09-12
Payer: MEDICARE

## 2022-09-12 VITALS
BODY MASS INDEX: 23.32 KG/M2 | DIASTOLIC BLOOD PRESSURE: 78 MMHG | HEIGHT: 65 IN | SYSTOLIC BLOOD PRESSURE: 134 MMHG | HEART RATE: 74 BPM | OXYGEN SATURATION: 97 % | WEIGHT: 140 LBS

## 2022-09-12 DIAGNOSIS — Z23 ENCOUNTER FOR VACCINATION: ICD-10-CM

## 2022-09-12 DIAGNOSIS — J30.9 ALLERGIC RHINITIS, UNSPECIFIED SEASONALITY, UNSPECIFIED TRIGGER: ICD-10-CM

## 2022-09-12 DIAGNOSIS — I10 PRIMARY HYPERTENSION: ICD-10-CM

## 2022-09-12 DIAGNOSIS — E03.9 ACQUIRED HYPOTHYROIDISM: ICD-10-CM

## 2022-09-12 DIAGNOSIS — F32.9 REACTIVE DEPRESSION: ICD-10-CM

## 2022-09-12 DIAGNOSIS — E78.5 HYPERLIPIDEMIA, UNSPECIFIED HYPERLIPIDEMIA TYPE: ICD-10-CM

## 2022-09-12 DIAGNOSIS — Z00.00 ENCOUNTER FOR SUBSEQUENT ANNUAL WELLNESS VISIT (AWV) IN MEDICARE PATIENT: Primary | ICD-10-CM

## 2022-09-12 PROCEDURE — G8420 CALC BMI NORM PARAMETERS: HCPCS | Performed by: FAMILY MEDICINE

## 2022-09-12 PROCEDURE — 90694 VACC AIIV4 NO PRSRV 0.5ML IM: CPT | Performed by: FAMILY MEDICINE

## 2022-09-12 PROCEDURE — 1090F PRES/ABSN URINE INCON ASSESS: CPT | Performed by: FAMILY MEDICINE

## 2022-09-12 PROCEDURE — G8427 DOCREV CUR MEDS BY ELIG CLIN: HCPCS | Performed by: FAMILY MEDICINE

## 2022-09-12 PROCEDURE — 1123F ACP DISCUSS/DSCN MKR DOCD: CPT | Performed by: FAMILY MEDICINE

## 2022-09-12 PROCEDURE — G8399 PT W/DXA RESULTS DOCUMENT: HCPCS | Performed by: FAMILY MEDICINE

## 2022-09-12 PROCEDURE — G0008 ADMIN INFLUENZA VIRUS VAC: HCPCS | Performed by: FAMILY MEDICINE

## 2022-09-12 PROCEDURE — 1036F TOBACCO NON-USER: CPT | Performed by: FAMILY MEDICINE

## 2022-09-12 PROCEDURE — G0439 PPPS, SUBSEQ VISIT: HCPCS | Performed by: FAMILY MEDICINE

## 2022-09-12 PROCEDURE — 99214 OFFICE O/P EST MOD 30 MIN: CPT | Performed by: FAMILY MEDICINE

## 2022-09-12 RX ORDER — AMLODIPINE BESYLATE 5 MG/1
TABLET ORAL
COMMUNITY
Start: 2022-08-23

## 2022-09-12 RX ORDER — KETOTIFEN FUMARATE 0.35 MG/ML
SOLUTION/ DROPS OPHTHALMIC PRN
COMMUNITY

## 2022-09-12 RX ORDER — AZELASTINE 1 MG/ML
1 SPRAY, METERED NASAL 2 TIMES DAILY
COMMUNITY

## 2022-09-12 RX ORDER — BUPROPION HYDROCHLORIDE 150 MG/1
150 TABLET ORAL 2 TIMES DAILY
Qty: 180 TABLET | Refills: 3 | Status: SHIPPED | OUTPATIENT
Start: 2022-09-12 | End: 2022-09-29 | Stop reason: SINTOL

## 2022-09-12 RX ORDER — BUPROPION HYDROCHLORIDE 150 MG/1
150 TABLET, EXTENDED RELEASE ORAL DAILY
COMMUNITY
End: 2022-09-12 | Stop reason: ALTCHOICE

## 2022-09-12 ASSESSMENT — LIFESTYLE VARIABLES
HOW MANY STANDARD DRINKS CONTAINING ALCOHOL DO YOU HAVE ON A TYPICAL DAY: PATIENT DOES NOT DRINK
HOW OFTEN DO YOU HAVE A DRINK CONTAINING ALCOHOL: NEVER

## 2022-09-12 ASSESSMENT — PATIENT HEALTH QUESTIONNAIRE - PHQ9
1. LITTLE INTEREST OR PLEASURE IN DOING THINGS: 1
SUM OF ALL RESPONSES TO PHQ9 QUESTIONS 1 & 2: 1
SUM OF ALL RESPONSES TO PHQ QUESTIONS 1-9: 1
2. FEELING DOWN, DEPRESSED OR HOPELESS: 0
SUM OF ALL RESPONSES TO PHQ QUESTIONS 1-9: 1

## 2022-09-12 ASSESSMENT — ENCOUNTER SYMPTOMS
RESPIRATORY NEGATIVE: 1
EYES NEGATIVE: 1

## 2022-09-12 NOTE — PATIENT INSTRUCTIONS
Personalized Preventive Plan for Nicole Marin - 9/12/2022  Medicare offers a range of preventive health benefits. Some of the tests and screenings are paid in full while other may be subject to a deductible, co-insurance, and/or copay. Some of these benefits include a comprehensive review of your medical history including lifestyle, illnesses that may run in your family, and various assessments and screenings as appropriate. After reviewing your medical record and screening and assessments performed today your provider may have ordered immunizations, labs, imaging, and/or referrals for you. A list of these orders (if applicable) as well as your Preventive Care list are included within your After Visit Summary for your review. Other Preventive Recommendations:    A preventive eye exam performed by an eye specialist is recommended every 1-2 years to screen for glaucoma; cataracts, macular degeneration, and other eye disorders. A preventive dental visit is recommended every 6 months. Try to get at least 150 minutes of exercise per week or 10,000 steps per day on a pedometer . Order or download the FREE \"Exercise & Physical Activity: Your Everyday Guide\" from [employer]. Call 4-635.680.5757 or search [employer] online. You need 8027-2903 mg of calcium and 8128-7083 IU of vitamin D per day. It is possible to meet your calcium requirement with diet alone, but a vitamin D supplement is usually necessary to meet this goal.  When exposed to the sun, use a sunscreen that protects against both UVA and UVB radiation with an SPF of 30 or greater. Reapply every 2 to 3 hours or after sweating, drying off with a towel, or swimming. Always wear a seat belt when traveling in a car. Always wear a helmet when riding a bicycle or motorcycle.

## 2022-09-12 NOTE — PROGRESS NOTES
HISTORY OF PRESENT ILLNESS    Zane Orozco is a 68 y.o. female. HPI  Chief Complaint   Patient presents with    Medicare AWV     See above. Overall feeling well but states she has been [place] little down\" since the passing of her former . Feels tired and lethargic. No tearfulness. Does not feel despondent. No change in sleep. States she did an otc test that showed she is sensitive to dairy products. Diarrhea had improved since stopping. Has had intolerance to cold for several months and increased constipation. Concerned that thyroid needs adjusting. Allergies have been stable. no recent need for inhaler. Tolerating statin with no side effects. States dementia symptoms persist but fluctuate with no pattern as to why.     Current Outpatient Medications on File Prior to Visit   Medication Sig Dispense Refill    azelastine (ASTELIN) 0.1 % nasal spray 1 spray by Nasal route 2 times daily      buPROPion (WELLBUTRIN SR) 150 MG extended release tablet Take 150 mg by mouth daily      ketotifen (ZADITOR) 0.025 % ophthalmic solution as needed      meloxicam (MOBIC) 7.5 MG tablet Take 1 tablet by mouth daily as needed for Pain 40 tablet 0    albuterol sulfate  (90 Base) MCG/ACT inhaler Inhale into the lungs      atorvastatin (LIPITOR) 10 MG tablet TAKE 1 TABLET BY MOUTH DAILY      cetirizine (ZYRTEC) 10 MG tablet Take 10 mg by mouth daily      dicyclomine (BENTYL) 10 MG capsule TAKE 1 CAPSULE BY MOUTH THREE TIMES DAILY AS NEEDED FOR DIARRHEA OR ABDOMINAL CRAMPS      escitalopram (LEXAPRO) 5 MG tablet Take 5 mg by mouth daily      fluticasone (FLONASE) 50 MCG/ACT nasal spray 2 sprays by Nasal route daily      fluticasone (FLOVENT HFA) 110 MCG/ACT inhaler Inhale into the lungs every 12 hours      levothyroxine (SYNTHROID) 50 MCG tablet Take 50 mcg by mouth every morning (before breakfast)      liothyronine (CYTOMEL) 5 MCG tablet Take 5 mcg by mouth daily      montelukast (SINGULAIR) 10 MG tablet Take 10 mg by mouth daily      rivastigmine (EXELON) 4.6 MG/24HR Place 1 patch onto the skin daily      amLODIPine (NORVASC) 5 MG tablet       metaxalone (SKELAXIN) 800 MG tablet Take 1 tablet by mouth every 8-12 hours as needed (muscle spasms) (Patient not taking: Reported on 9/12/2022) 60 tablet 0    amLODIPine (NORVASC) 10 MG tablet Take 10 mg by mouth daily      Fluticasone furoate-vilanterol (BREO ELLIPTA) 200-25 MCG/INH AEPB inhaler Inhale 1 puff into the lungs daily (Patient not taking: Reported on 9/12/2022)       No current facility-administered medications on file prior to visit. Past Medical History:   Diagnosis Date    Acid reflux     Arrhythmia 2013    palpitations     Arthritis     Asthma     followed by Dr. Dawson Coats    Bronchitis     CAD (coronary artery disease)     hypertension    Essential hypertension 6/13/2016    GERD (gastroesophageal reflux disease)     Followed by GI    Gluten intolerance     H/O echocardiogram 2013    [address] [place] [address] Cardiology    H/O exercise stress test 2013    [address] [place] [address] Cardiology    Headache     Hearing loss     Hiatal hernia     Hypercholesterolemia     Hypothyroidism 12/6/2019    IBS (irritable bowel syndrome)     Memory difficulties 5/24/2018    Menopause     Osteopenia 10/2/2015    Pap smear for cervical cancer screening 2012    Dr. Ross Coad    Plantar fasciitis     Supraventricular premature beats 6/23/2015    Vascular dementia ([facility] [address]        Review of Systems   Constitutional:  Positive for fatigue. Negative for activity change, appetite change, chills, diaphoresis, fever and unexpected weight change. HENT: Negative. Eyes: Negative. Respiratory: Negative. Cardiovascular: Negative. Endocrine: Positive for cold intolerance. Negative for heat intolerance, polydipsia, polyphagia and polyuria. Genitourinary: Negative. Musculoskeletal:  Positive for arthralgias (DDD; responds to med). Allergic/Immunologic: Positive for environmental allergies.  Negative for food allergies and immunocompromised state. Neurological: Negative. Hematological: Negative. Psychiatric/Behavioral:  Positive for dysphoric mood. Negative for agitation, behavioral problems, confusion, hallucinations, sleep disturbance and suicidal ideas. The patient is not nervous/anxious. Blood pressure 134/78, pulse 74, height 5' 4.57\" (1.64 m), weight 140 lb (63.5 kg), SpO2 97 %. Physical Exam  Vitals and nursing note reviewed. Constitutional:       Appearance: Normal appearance. HENT:      Mouth/Throat:      Mouth: Mucous membranes are moist.      Pharynx: Oropharynx is clear. Eyes:      Conjunctiva/sclera: Conjunctivae normal.   Neck:      Vascular: No carotid bruit. Cardiovascular:      Rate and Rhythm: Normal rate and regular rhythm. Heart sounds: Normal heart sounds. Pulmonary:      Breath sounds: Normal breath sounds. Musculoskeletal:         General: No swelling. Cervical back: Neck supple. Lymphadenopathy:      Cervical: No cervical adenopathy. Neurological:      Mental Status: She is oriented to person, place, and time. Mental status is at baseline. Cranial Nerves: No cranial nerve deficit. Deep Tendon Reflexes: Reflexes normal.   Psychiatric:         Mood and Affect: Mood normal.        ASSESSMENT and PLAN    Marianela Marrero was seen today for medicare awv. Diagnoses and all orders for this visit:    Encounter for subsequent annual wellness visit (AWV) in Medicare patient    Primary hypertension  -     Urinalysis; Future  -     CBC with Auto Differential; Future  -     Comprehensive Metabolic Panel; Future    Acquired hypothyroidism    Hyperlipidemia, unspecified hyperlipidemia type  -     Lipid Panel; Future    Allergic rhinitis, unspecified seasonality, unspecified trigger   Note initial BP elevation decreased on recheck by me  Reviewed medications with patient.  Given written instructions to make sure she is taking Wellbutrin not Lexapro (list from home indicated Lexapro but this had been changed to Wellbutrin) and increase dose to twice daily. Continue other maintenance measures. Update immunization with flu shot. Notify lab results      Return in 1 year (on 9/12/2023) for Medicare Annual Wellness Visit in 1 year. Marion Banuelos MD  Medicare Annual Wellness Visit    Derrek Lowry is [facility] for [facility] AWV    Assessment & Plan   Encounter for subsequent annual wellness visit (AWV) in Medicare patient  Primary hypertension  -     Urinalysis; Future  -     CBC with Auto Differential; Future  -     Comprehensive Metabolic Panel; Future  Acquired hypothyroidism  Hyperlipidemia, unspecified hyperlipidemia type  -     Lipid Panel; Future  Allergic rhinitis, unspecified seasonality, unspecified trigger    Recommendations for Preventive Services Due: see orders and patient instructions/AVS.  Recommended screening schedule for the next 5-10 years is provided to the patient in written form: see Patient Instructions/AVS.     Return in 1 year (on 9/12/2023) for Medicare Annual Wellness Visit in 1 year. Subjective       Patient's complete Health Risk Assessment and screening values have been reviewed and are found in Flowsheets. The following problems were reviewed today and where indicated follow up appointments were made and/or referrals ordered.     Positive Risk Factor Screenings with Interventions:             General Health and ACP:  General  In general, how would you say your health is?: Good  In the past 7 days, have you experienced any of the following: New or Increased Pain, New or Increased Fatigue, Loneliness, Social Isolation, Stress or Anger?: No  Do you get the social and emotional support that you need?: Yes  Do you have a Living Will?: Yes    Advance Directives       Power of  Living Will ACP-Advance Directive ACP-Power of     Not on File Not on File Not on File Filed        General Health Risk Interventions:  Fatigue: regular exercise recommended- 3-5 times per week, 30-45 minutes per session    Health Habits/Nutrition:  Physical Activity: Inactive    Days of Exercise per Week: 0 days    Minutes of Exercise per Session: 0 min     Have you lost any weight without trying in the past 3 months?: No  Body mass index: 23.61  Have you seen the dentist within the past year?: Yes           Objective   Vitals:    09/12/22 0945 09/12/22 0956   BP: (!) 150/88 134/78   Site: Left Upper Arm Right Upper Arm   Position: Sitting Sitting   Cuff Size: Medium Adult Medium Adult   Pulse: 74    SpO2: 97%    Weight: 140 lb (63.5 kg)    Height: 5' 4.57\" (1.64 m)       Body mass index is 23.61 kg/m². Allergies   Allergen Reactions    Codeine Other (See Comments)     BP drops  Severe chest pain    Morphine Other (See Comments)     BP drops  Severe chest pain    Amoxicillin-Pot Clavulanate Diarrhea    Ciprofloxacin Diarrhea    Meperidine Hives and Swelling    Sulfamethoxazole-Trimethoprim Diarrhea    Telithromycin Diarrhea    Alendronate Other (See Comments)     Mouth sores, upset stomach, diarrhea    Cefdinir Diarrhea     severe    Cephalosporins Diarrhea    Esomeprazole Magnesium Diarrhea    Gluten Meal Diarrhea    Levothyroxine Hives     Brand name Synthroid causes hives. Loperamide Other (See Comments)    Promethazine Other (See Comments)     Hallucinations    Sulfa Antibiotics Diarrhea     Prior to Visit Medications    Medication Sig Taking?  Authorizing Provider   azelastine (ASTELIN) 0.1 % nasal spray 1 spray by Nasal route 2 times daily Yes Historical Provider, MD   buPROPion (WELLBUTRIN SR) 150 MG extended release tablet Take 150 mg by mouth daily Yes Historical Provider, MD   ketotifen (ZADITOR) 0.025 % ophthalmic solution as needed Yes Historical Provider, MD   meloxicam (MOBIC) 7.5 MG tablet Take 1 tablet by mouth daily as needed for Pain Yes Kana Emery., MD   albuterol sulfate  (90 Base) MCG/ACT inhaler Inhale into the lungs Yes Ar Automatic Reconciliation   atorvastatin (LIPITOR) 10 MG tablet TAKE 1 TABLET BY MOUTH DAILY Yes Ar Automatic Reconciliation   cetirizine (ZYRTEC) 10 MG tablet Take 10 mg by mouth daily Yes Ar Automatic Reconciliation   dicyclomine (BENTYL) 10 MG capsule TAKE 1 CAPSULE BY MOUTH THREE TIMES DAILY AS NEEDED FOR DIARRHEA OR ABDOMINAL CRAMPS Yes Ar Automatic Reconciliation   escitalopram (LEXAPRO) 5 MG tablet Take 5 mg by mouth daily Yes Ar Automatic Reconciliation   fluticasone (FLONASE) 50 MCG/ACT nasal spray 2 sprays by Nasal route daily Yes Ar Automatic Reconciliation   fluticasone (FLOVENT HFA) 110 MCG/ACT inhaler Inhale into the lungs every 12 hours Yes Ar Automatic Reconciliation   levothyroxine (SYNTHROID) 50 MCG tablet Take 50 mcg by mouth every morning (before breakfast) Yes Ar Automatic Reconciliation   liothyronine (CYTOMEL) 5 MCG tablet Take 5 mcg by mouth daily Yes Ar Automatic Reconciliation   montelukast (SINGULAIR) 10 MG tablet Take 10 mg by mouth daily Yes Ar Automatic Reconciliation   rivastigmine (EXELON) 4.6 MG/24HR Place 1 patch onto the skin daily Yes Ar Automatic Reconciliation   amLODIPine (NORVASC) 5 MG tablet   Historical Provider, MD   metaxalone (SKELAXIN) 800 MG tablet Take 1 tablet by mouth every 8-12 hours as needed (muscle spasms)  Patient not taking: Reported on 9/12/2022  Mamie Diggs MD   amLODIPine (NORVASC) 10 MG tablet Take 10 mg by mouth daily  Ar Automatic Reconciliation   Fluticasone furoate-vilanterol (BREO ELLIPTA) 200-25 MCG/INH AEPB inhaler Inhale 1 puff into the lungs daily  Patient not taking: Reported on 9/12/2022  Ar Automatic Reconciliation       CareTeam (Including outside providers/suppliers regularly involved in providing care):   Patient Care Team:  aMmie Diggs MD as PCP - General  Mamie Diggs MD as PCP - [facility] Empaneled Provider     Reviewed and updated this visit:  Allergies  Meds  Problems  Med Hx  Surg Hx Soc Hx  Fam Hx

## 2022-09-13 ENCOUNTER — TELEPHONE (OUTPATIENT)
Dept: FAMILY MEDICINE CLINIC | Facility: CLINIC | Age: 77
End: 2022-09-13

## 2022-09-13 LAB
ALBUMIN SERPL-MCNC: 4 G/DL (ref 3.2–4.6)
ALBUMIN/GLOB SERPL: 1.5 {RATIO} (ref 1.2–3.5)
ALP SERPL-CCNC: 142 U/L (ref 50–136)
ALT SERPL-CCNC: 24 U/L (ref 12–65)
ANION GAP SERPL CALC-SCNC: 9 MMOL/L (ref 4–13)
AST SERPL-CCNC: 15 U/L (ref 15–37)
BASOPHILS # BLD: 0 K/UL (ref 0–0.2)
BASOPHILS NFR BLD: 1 % (ref 0–2)
BILIRUB SERPL-MCNC: 0.4 MG/DL (ref 0.2–1.1)
BUN SERPL-MCNC: 11 MG/DL (ref 8–23)
CALCIUM SERPL-MCNC: 9.3 MG/DL (ref 8.3–10.4)
CHLORIDE SERPL-SCNC: 104 MMOL/L (ref 101–110)
CHOLEST SERPL-MCNC: 162 MG/DL
CO2 SERPL-SCNC: 27 MMOL/L (ref 21–32)
CREAT SERPL-MCNC: 0.8 MG/DL (ref 0.6–1)
DIFFERENTIAL METHOD BLD: NORMAL
EOSINOPHIL # BLD: 0 K/UL (ref 0–0.8)
EOSINOPHIL NFR BLD: 1 % (ref 0.5–7.8)
ERYTHROCYTE [DISTWIDTH] IN BLOOD BY AUTOMATED COUNT: 13.7 % (ref 11.9–14.6)
GLOBULIN SER CALC-MCNC: 2.7 G/DL (ref 2.3–3.5)
GLUCOSE SERPL-MCNC: 63 MG/DL (ref 65–100)
HCT VFR BLD AUTO: 43 % (ref 35.8–46.3)
HDLC SERPL-MCNC: 72 MG/DL (ref 40–60)
HDLC SERPL: 2.3 {RATIO}
HGB BLD-MCNC: 13.7 G/DL (ref 11.7–15.4)
IMM GRANULOCYTES # BLD AUTO: 0 K/UL (ref 0–0.5)
IMM GRANULOCYTES NFR BLD AUTO: 0 % (ref 0–5)
LDLC SERPL CALC-MCNC: 69 MG/DL
LYMPHOCYTES # BLD: 1.1 K/UL (ref 0.5–4.6)
LYMPHOCYTES NFR BLD: 21 % (ref 13–44)
MCH RBC QN AUTO: 30.9 PG (ref 26.1–32.9)
MCHC RBC AUTO-ENTMCNC: 31.9 G/DL (ref 31.4–35)
MCV RBC AUTO: 96.8 FL (ref 79.6–97.8)
MONOCYTES # BLD: 0.5 K/UL (ref 0.1–1.3)
MONOCYTES NFR BLD: 9 % (ref 4–12)
NEUTS SEG # BLD: 3.6 K/UL (ref 1.7–8.2)
NEUTS SEG NFR BLD: 68 % (ref 43–78)
NRBC # BLD: 0 K/UL (ref 0–0.2)
PLATELET # BLD AUTO: 215 K/UL (ref 150–450)
PMV BLD AUTO: 11.4 FL (ref 9.4–12.3)
POTASSIUM SERPL-SCNC: 3.7 MMOL/L (ref 3.5–5.1)
PROT SERPL-MCNC: 6.7 G/DL (ref 6.3–8.2)
RBC # BLD AUTO: 4.44 M/UL (ref 4.05–5.2)
SODIUM SERPL-SCNC: 140 MMOL/L (ref 136–145)
T4 FREE SERPL-MCNC: 1.3 NG/DL (ref 0.78–1.46)
TRIGL SERPL-MCNC: 105 MG/DL (ref 35–150)
TSH, 3RD GENERATION: 1.48 UIU/ML (ref 0.36–3.74)
VLDLC SERPL CALC-MCNC: 21 MG/DL (ref 6–23)
WBC # BLD AUTO: 5.3 K/UL (ref 4.3–11.1)

## 2022-09-13 NOTE — TELEPHONE ENCOUNTER
----- Message from Shakira Cohen MD sent at 9/13/2022  9:27 AM EDT -----  Notify labs are normal. F/U as needed.

## 2022-09-15 ENCOUNTER — TELEPHONE (OUTPATIENT)
Dept: FAMILY MEDICINE CLINIC | Facility: CLINIC | Age: 77
End: 2022-09-15

## 2022-09-15 NOTE — TELEPHONE ENCOUNTER
Pt daughter called wanting to make Dr. Sawyer Lebron aware that she is taking escitalopram (by neuro) and now Wellbutrin. She would like to double check that there is not an interaction and would like verification that this is safe for her to take together.      Please call daughter back at [phone]

## 2022-09-29 ENCOUNTER — OFFICE VISIT (OUTPATIENT)
Dept: NEUROLOGY | Age: 77
End: 2022-09-29
Payer: MEDICARE

## 2022-09-29 VITALS
WEIGHT: 140 LBS | BODY MASS INDEX: 23.9 KG/M2 | DIASTOLIC BLOOD PRESSURE: 74 MMHG | SYSTOLIC BLOOD PRESSURE: 133 MMHG | OXYGEN SATURATION: 92 % | HEIGHT: 64 IN | HEART RATE: 65 BPM

## 2022-09-29 DIAGNOSIS — F03.90 DEMENTIA WITHOUT BEHAVIORAL DISTURBANCE, UNSPECIFIED DEMENTIA TYPE: Primary | ICD-10-CM

## 2022-09-29 DIAGNOSIS — Z86.59 HISTORY OF DEPRESSION: ICD-10-CM

## 2022-09-29 DIAGNOSIS — R44.1 VISUAL HALLUCINATIONS: ICD-10-CM

## 2022-09-29 PROCEDURE — G8420 CALC BMI NORM PARAMETERS: HCPCS | Performed by: NURSE PRACTITIONER

## 2022-09-29 PROCEDURE — 1036F TOBACCO NON-USER: CPT | Performed by: NURSE PRACTITIONER

## 2022-09-29 PROCEDURE — 1090F PRES/ABSN URINE INCON ASSESS: CPT | Performed by: NURSE PRACTITIONER

## 2022-09-29 PROCEDURE — 1123F ACP DISCUSS/DSCN MKR DOCD: CPT | Performed by: NURSE PRACTITIONER

## 2022-09-29 PROCEDURE — G8399 PT W/DXA RESULTS DOCUMENT: HCPCS | Performed by: NURSE PRACTITIONER

## 2022-09-29 PROCEDURE — G8427 DOCREV CUR MEDS BY ELIG CLIN: HCPCS | Performed by: NURSE PRACTITIONER

## 2022-09-29 PROCEDURE — 99214 OFFICE O/P EST MOD 30 MIN: CPT | Performed by: NURSE PRACTITIONER

## 2022-09-29 RX ORDER — RIVASTIGMINE TARTRATE 3 MG/1
3 CAPSULE ORAL 2 TIMES DAILY
Qty: 60 CAPSULE | Refills: 3 | Status: SHIPPED | OUTPATIENT
Start: 2022-09-29

## 2022-09-29 RX ORDER — ESCITALOPRAM OXALATE 10 MG/1
10 TABLET ORAL DAILY
Qty: 90 TABLET | Refills: 1 | Status: SHIPPED | OUTPATIENT
Start: 2022-09-29

## 2022-09-29 ASSESSMENT — PATIENT HEALTH QUESTIONNAIRE - PHQ9
SUM OF ALL RESPONSES TO PHQ QUESTIONS 1-9: 2
SUM OF ALL RESPONSES TO PHQ QUESTIONS 1-9: 2
1. LITTLE INTEREST OR PLEASURE IN DOING THINGS: 1
SUM OF ALL RESPONSES TO PHQ QUESTIONS 1-9: 2
SUM OF ALL RESPONSES TO PHQ QUESTIONS 1-9: 2
2. FEELING DOWN, DEPRESSED OR HOPELESS: 1
SUM OF ALL RESPONSES TO PHQ9 QUESTIONS 1 & 2: 2

## 2022-09-29 NOTE — PROGRESS NOTES
[facility]  [address] [facility]  [address], [address] [facility]      Chief Complaint   Patient presents with    Follow-up    Memory Loss     Medication adjustment           Ximena Rider is a 68 y.o. female who presents for follow-up for memory impairment, vascular dementia. Past medical history significant for coronary artery disease, essential hypertension, hypercholesterolemia, hypothyroidism, [place] who was previously evaluated by Dr. Indigo Weathers for memory difficulties. She was evaluated by Dr. Sher Camilo for neuropsych evaluation which demonstrated possible vascular dementia etiology. CT/PET scan unrevealing, no evidence of Alzheimer's dementia. She was previously started on donepezil 5 mg daily. Interval history:  She is here today with her daughter. Continues to endorse difficulty with short-term memory recall. On previous visit, she was started on exelon transdermal patch. Family stated that her memory is progressively worse. She endorses reaction to adhesive with the patches, otherwise no side effects. She is independent of her ADLs bathing, feeding herself, and dressing. However she requires assistance with medications, appointments, and finances. She has hx of sleep walking as child. Denies REM sleep behavior. She endorses feelings of depression and isolation. She often gets frustrated because of her memory. She has a sedentary lifestyle. She was previously started on escitalopram for depression, however this was discontinued and she has been transitioned to Wellbutrin  mg twice daily. Since taking this medication, she endorses seeing people who are not there.  She is aware these visual hallucinations are not real.     Past Medical History:   Diagnosis Date    Acid reflux     Arrhythmia 2013    palpitations     Arthritis     Asthma     followed by Dr. Kika Mahoney    Bronchitis     CAD (coronary artery disease)     hypertension    Essential hypertension 6/13/2016    GERD (gastroesophageal reflux disease)     Followed by GI    Gluten intolerance     H/O echocardiogram 2013    [facility]    H/O exercise stress test 2013    [facility]    Headache     Hearing loss     Hiatal hernia     Hypercholesterolemia     Hypothyroidism 12/6/2019    IBS (irritable bowel syndrome)     Memory difficulties 5/24/2018    Menopause     Osteopenia 10/2/2015    Pap smear for cervical cancer screening 2012    Dr. Dulce Smith    Plantar fasciitis     Supraventricular premature beats 6/23/2015       Past Surgical History:   Procedure Laterality Date    HX CATARACT REMOVAL Bilateral 2014    HX CATARACT REMOVAL  06/30/2014    HX COLONOSCOPY  2014    Dr Kate Adler; 2003    HX GYN  2004    endometrial Ablation    HX HEENT Left 07/08/2019    laser eye surgery    HX OPEN REDUCTION INTERNAL FIXATION Right 1995    HX ORTHOPAEDIC Right     wrist pin placement    HX TONSILLECTOMY  1950    HX TUBAL LIGATION Bilateral 1981    BTL    HX WISDOM TEETH EXTRACTION  1964    AL BREAST SURGERY PROCEDURE UNLISTED      AL LAP,CHOLECYSTECTOMY      AL LAP,TUBAL CAUTERY         Family History   Problem Relation Age of Onset    Diabetes Mother         63's    Hypertension Mother         63's    Heart Disease Mother         63's    Elevated Lipids Mother         Dyslipidemia    Thyroid Disease Mother     Breast Cancer Sister         late 63's    Cancer Sister         breast    Diabetes Brother     Thyroid Disease Daughter         Hyperthyroidism status post thyroidectomy (benign)    Colon Cancer Neg Hx     Ovarian Cancer Neg Hx        Social History     Socioeconomic History    Marital status:    Tobacco Use    Smoking status: Never Smoker    Smokeless tobacco: Never Used   Vaping Use    Vaping Use: Never used   Substance and Sexual Activity    Alcohol use: No    Drug use: No    Sexual activity: Not Currently     Current Outpatient Medications on File Prior to Visit Medication Sig Dispense Refill    azelastine (ASTELIN) 0.1 % nasal spray 1 spray by Nasal route 2 times daily      ketotifen (ZADITOR) 0.025 % ophthalmic solution as needed      amLODIPine (NORVASC) 5 MG tablet       albuterol sulfate  (90 Base) MCG/ACT inhaler Inhale into the lungs      atorvastatin (LIPITOR) 10 MG tablet TAKE 1 TABLET BY MOUTH DAILY      cetirizine (ZYRTEC) 10 MG tablet Take 10 mg by mouth daily      dicyclomine (BENTYL) 10 MG capsule TAKE 1 CAPSULE BY MOUTH THREE TIMES DAILY AS NEEDED FOR DIARRHEA OR ABDOMINAL CRAMPS      fluticasone (FLONASE) 50 MCG/ACT nasal spray 2 sprays by Nasal route daily      levothyroxine (SYNTHROID) 50 MCG tablet Take 50 mcg by mouth every morning (before breakfast)      liothyronine (CYTOMEL) 5 MCG tablet Take 5 mcg by mouth daily      montelukast (SINGULAIR) 10 MG tablet Take 10 mg by mouth daily      meloxicam (MOBIC) 7.5 MG tablet Take 1 tablet by mouth daily as needed for Pain (Patient not taking: Reported on 9/29/2022) 40 tablet 0    fluticasone (FLOVENT HFA) 110 MCG/ACT inhaler Inhale into the lungs every 12 hours (Patient not taking: Reported on 9/29/2022)       No current facility-administered medications on file prior to visit. Allergies   Allergen Reactions    Codeine Other (See Comments)     BP drops  Severe chest pain    Morphine Other (See Comments)     BP drops  Severe chest pain    Amoxicillin-Pot Clavulanate Diarrhea    Ciprofloxacin Diarrhea    Gluten Diarrhea    Meperidine Hives and Swelling    Sulfamethoxazole-Trimethoprim Diarrhea    Telithromycin Diarrhea    Alendronate Other (See Comments)     Mouth sores, upset stomach, diarrhea    Cefdinir Diarrhea     severe    Cephalosporins Diarrhea    Gluten Meal Diarrhea    Levothyroxine Hives     Brand name Synthroid causes hives. Levothyroxine Sodium Hives    Loperamide Other (See Comments)    Loperamide Hcl      Other reaction(s): chest pain, dec.  BP    Meperidine Hcl Hives Promethazine Other (See Comments)     Hallucinations    Sulfa Antibiotics Diarrhea    Esomeprazole Magnesium Diarrhea and Rash           REVIEW OF SYSTEMS:  CONSTITUTIONAL: fatigue No weight loss, fever, chills, weakness. HEENT: Eyes: No visual loss, blurred vision, double vision or yellow sclerae. Ears, Nose, Throat: No hearing loss, sneezing, congestion, runny nose or sore throat. SKIN: dry, flaky skin No rash or itching. CARDIOVASCULAR: No chest pain, chest pressure or chest discomfort. No palpitations or edema. RESPIRATORY: No shortness of breath, cough or sputum. GASTROINTESTINAL: No anorexia, nausea, vomiting or diarrhea. No abdominal pain or blood. GENITOURINARY: no burning with urination. NEUROLOGICAL: No headache, dizziness, syncope, paralysis, ataxia, numbness or tingling in the extremities. No change in bowel or bladder control. MUSCULOSKELETAL: No muscle, back pain, joint pain or stiffness. HEMATOLOGIC: No anemia, bleeding or bruising. LYMPHATICS: No enlarged nodes. No history of splenectomy. PSYCHIATRIC: visual hallucinations, history of depression or anxiety. ENDOCRINOLOGIC: cold intolerance No reports of sweating,  heat intolerance. No polyuria or polydipsia. ALLERGIES: No history of asthma, hives, eczema or rhinitis. Physical Examination  /74 (Site: Left Upper Arm, Position: Sitting, Cuff Size: Medium Adult)   Pulse 65   Ht 5' 4\" (1.626 m)   Wt 140 lb (63.5 kg)   SpO2 92%   BMI 24.03 kg/m²         General - Well developed, well nourished, in no apparent distress. Pleasant and conversent. HEENT - Normocephalic, atraumatic. Conjunctiva, tympanic membranes, and oropharynx are clear. Neck - Supple without masses, no bruits   Cardiovascular - Regular rate and rhythm. Normal S1, S2 without murmurs, rubs, or gallops. Lungs - Clear to auscultation. Abdomen - Soft, nontender with normal bowel sounds. Extremities - Peripheral pulses intact. No edema and no rashes. Neurological examination -alert and oriented to person, place, time. Able to name current president, however unable to name previous president. Able to follow one-step commands, difficulty with two-step commands. Comprehension fair, attention fair, memory  and reasoning are impaired. Language and speech are normal. On cranial nerve examination pupils are equal round and reactive to light. Fundoscopic examination is normal. Visual acuity is adequate. Visual fields are full to finger confrontation. Extraocular motility is normal. Face is symmetric and sensation is intact to light touch. Hearing is intact to finger rustle bilaterally. Motor examination - There is normal muscle tone and bulk. Power is full throughout. Muscle stretch reflexes are normoactive and there are no pathological reflexes present. Sensation is intact to light touch, pinprick, vibration and proprioception in all extremities. Cerebellar examination is normal finger-nose. Gait and stance are normal with decreased armswing. Diagnoses and all orders for this visit:    1. Dementia without behavioral disturbance, unspecified ([facility] [address]  This was previously mentioned by Dr. Lindsey Washington, however no report is available to review. PET CT without evidence of Alzheimer's. She has a history of GI distress and falls. She was previously tried on donepezil however this has been stopped. Discontinue Exelon patch due to reaction to adhesive. Will transition to oral rivastigmine 3 mg twice daily. Medication and side effects discussed. Family verbalized understanding to call office if she develops new or worsening symptoms. 2. History of depression  Likely multifactorial. She has been experiencing visual hallucinations since transitioning to Wellbutrin. Wellbutrin can cause increased confusion and even psychosis in those with underlying degenerative process. Will discontinue Wellbutrin. Start on escitalopram 10 mg daily.  Medication and side effects discussed. 3. Visual hallucinations    Plan:     Interventions that may decrease conversion from mild cognitive impairment to dementia or aid in the treatment of established dementia:   Reduce damage the brain by controlling vascular risk factors (SBP < 140 mmHg, normal cholesterol, BMI < 31 kg/m²). 2. Exercise 20 minutes three times per week reaching 80% maximum heart rate   3. Diet: adhere to a Mediterranean diet     Symptomatic management   Cholinesterase inhibitors to help with memory and attention. These medications can cause nausea, vomiting, diarrhea, syncope, bradycardia, and may increase the risk of falls. For moderate to severe dementia memantine can be added. Cognitive enrichment by taking part in social activities and support groups. For delirium and sundowning: cholinesterase inhibitor and melatonin can be helpful. For the prevention of delirium, re-orientation, daily routine, a normal sleep wake cycle, and socialization are most important. Benzodiazepines and anticholinergic medications should be avoided as these are frequent causes of delirium. If all measures have failed and the patient is a danger to self or others then certain atypical antipsychotics can be used as low doses (quetiapine 25 mg at night). Depression: depression is very common in dementia. SSRIs are preferred (i.e. Citalopram,escitalopram, etc.). If depression coexists with apathy then I recommend bupropion. Sleep: I counseled the patient on sleep hygiene, including keeping the lights off at night, TV off, and the benefits of herbal tea, etc. Melatonin, trazodone, and mirtazapine are helpful medications. I recommend against the use of sedative hypnotics. Fatigue: amantadine and modafinil may be mildly effective. Agitation and aggression: attempt to resolve with communication. Always introduce the patient to everyone in the room and make eye contact with the patient. Use a calm and friendly voice.  Certain SSRIs may be helpful     Follow up in 6 months or sooner if needed     I spent greater than 50% of the 45 total minutes of today's visit in coordination of care and patient/family education and counseling regarding the above patient concerns, reviewing the patient's medical record, my assessment and recommendations.         Kevin Velazco, APRN

## 2022-10-03 ENCOUNTER — HOSPITAL ENCOUNTER (EMERGENCY)
Dept: GENERAL RADIOLOGY | Age: 77
Discharge: HOME | End: 2022-10-06
Payer: MEDICARE

## 2022-10-03 ENCOUNTER — HOSPITAL ENCOUNTER (EMERGENCY)
Age: 77
Discharge: HOME | End: 2022-10-03
Attending: EMERGENCY MEDICINE
Payer: MEDICARE

## 2022-10-03 VITALS
DIASTOLIC BLOOD PRESSURE: 101 MMHG | HEART RATE: 71 BPM | WEIGHT: 144 LBS | SYSTOLIC BLOOD PRESSURE: 154 MMHG | HEIGHT: 66 IN | BODY MASS INDEX: 23.14 KG/M2 | OXYGEN SATURATION: 98 % | TEMPERATURE: 98 F | RESPIRATION RATE: 21 BRPM

## 2022-10-03 DIAGNOSIS — R19.7 DIARRHEA, UNSPECIFIED TYPE: Primary | ICD-10-CM

## 2022-10-03 DIAGNOSIS — R06.02 SHORTNESS OF BREATH AT REST: ICD-10-CM

## 2022-10-03 DIAGNOSIS — R42 LIGHTHEADEDNESS: ICD-10-CM

## 2022-10-03 DIAGNOSIS — J45.21 MILD INTERMITTENT REACTIVE AIRWAY DISEASE WITH ACUTE EXACERBATION: ICD-10-CM

## 2022-10-03 LAB
ALBUMIN SERPL-MCNC: 4 G/DL (ref 3.2–4.6)
ALBUMIN/GLOB SERPL: 1.3 {RATIO} (ref 1.2–3.5)
ALP SERPL-CCNC: 134 U/L (ref 50–130)
ALT SERPL-CCNC: 23 U/L (ref 12–65)
ANION GAP SERPL CALC-SCNC: 7 MMOL/L (ref 4–13)
AST SERPL-CCNC: 15 U/L (ref 15–37)
BASOPHILS # BLD: 0 K/UL (ref 0–0.2)
BASOPHILS NFR BLD: 0 % (ref 0–2)
BILIRUB SERPL-MCNC: 0.4 MG/DL (ref 0.2–1.1)
BUN SERPL-MCNC: 15 MG/DL (ref 8–23)
CALCIUM SERPL-MCNC: 9.4 MG/DL (ref 8.3–10.4)
CHLORIDE SERPL-SCNC: 105 MMOL/L (ref 101–110)
CO2 SERPL-SCNC: 27 MMOL/L (ref 21–32)
CREAT SERPL-MCNC: 1.12 MG/DL (ref 0.6–1)
DIFFERENTIAL METHOD BLD: ABNORMAL
EOSINOPHIL # BLD: 0 K/UL (ref 0–0.8)
EOSINOPHIL NFR BLD: 0 % (ref 0.5–7.8)
ERYTHROCYTE [DISTWIDTH] IN BLOOD BY AUTOMATED COUNT: 12.8 % (ref 11.9–14.6)
GLOBULIN SER CALC-MCNC: 3.2 G/DL (ref 2.3–3.5)
GLUCOSE SERPL-MCNC: 161 MG/DL (ref 65–100)
HCT VFR BLD AUTO: 40.2 % (ref 35.8–46.3)
HGB BLD-MCNC: 14 G/DL (ref 11.7–15.4)
IMM GRANULOCYTES # BLD AUTO: 0 K/UL (ref 0–0.5)
IMM GRANULOCYTES NFR BLD AUTO: 0 % (ref 0–5)
LYMPHOCYTES # BLD: 1.1 K/UL (ref 0.5–4.6)
LYMPHOCYTES NFR BLD: 13 % (ref 13–44)
MAGNESIUM SERPL-MCNC: 2.3 MG/DL (ref 1.8–2.4)
MCH RBC QN AUTO: 30.5 PG (ref 26.1–32.9)
MCHC RBC AUTO-ENTMCNC: 34.8 G/DL (ref 31.4–35)
MCV RBC AUTO: 87.6 FL (ref 79.6–97.8)
MONOCYTES # BLD: 0.5 K/UL (ref 0.1–1.3)
MONOCYTES NFR BLD: 6 % (ref 4–12)
NEUTS SEG # BLD: 6.8 K/UL (ref 1.7–8.2)
NEUTS SEG NFR BLD: 80 % (ref 43–78)
NRBC # BLD: 0 K/UL (ref 0–0.2)
PLATELET # BLD AUTO: 215 K/UL (ref 150–450)
PMV BLD AUTO: 10.6 FL (ref 9.4–12.3)
POTASSIUM SERPL-SCNC: 3.7 MMOL/L (ref 3.5–5.1)
PROT SERPL-MCNC: 7.2 G/DL (ref 6.3–8.2)
RBC # BLD AUTO: 4.59 M/UL (ref 4.05–5.2)
SODIUM SERPL-SCNC: 139 MMOL/L (ref 136–145)
TROPONIN I SERPL HS-MCNC: 5.7 PG/ML (ref 0–14)
WBC # BLD AUTO: 8.5 K/UL (ref 4.3–11.1)

## 2022-10-03 PROCEDURE — 99285 EMERGENCY DEPT VISIT HI MDM: CPT

## 2022-10-03 PROCEDURE — 83735 ASSAY OF MAGNESIUM: CPT

## 2022-10-03 PROCEDURE — 93005 ELECTROCARDIOGRAM TRACING: CPT | Performed by: EMERGENCY MEDICINE

## 2022-10-03 PROCEDURE — 71045 X-RAY EXAM CHEST 1 VIEW: CPT

## 2022-10-03 PROCEDURE — 80053 COMPREHEN METABOLIC PANEL: CPT

## 2022-10-03 PROCEDURE — 85025 COMPLETE CBC W/AUTO DIFF WBC: CPT

## 2022-10-03 PROCEDURE — 2580000003 HC RX 258: Performed by: EMERGENCY MEDICINE

## 2022-10-03 PROCEDURE — 84484 ASSAY OF TROPONIN QUANT: CPT

## 2022-10-03 RX ORDER — 0.9 % SODIUM CHLORIDE 0.9 %
500 INTRAVENOUS SOLUTION INTRAVENOUS ONCE
Status: COMPLETED | OUTPATIENT
Start: 2022-10-03 | End: 2022-10-03

## 2022-10-03 RX ADMIN — SODIUM CHLORIDE 500 ML: 9 INJECTION, SOLUTION INTRAVENOUS at 16:30

## 2022-10-03 ASSESSMENT — ENCOUNTER SYMPTOMS
ABDOMINAL PAIN: 1
RECENT COUGH: 0
VOMITING: 0
DIARRHEA: 1

## 2022-10-03 NOTE — ED NOTES
I have reviewed discharge instructions with the patient. The patient verbalized understanding. Patient left ED via Discharge Method: ambulatory to Home with self. Opportunity for questions and clarification provided. Patient given 0 scripts. To continue your aftercare when you leave the hospital, you may receive an automated call from our care team to check in on how you are doing. This is a free service and part of our promise to provide the best care and service to meet your aftercare needs.  If you have questions, or wish to unsubscribe from this service please call [phone]. Thank you for Choosing our [facility].         Ria Whaley RN  10/03/22 [id number]

## 2022-10-03 NOTE — ED PROVIDER NOTES
Emergency Department Provider Note                   PCP:                Hammad Garibay MD               Age: 68 y.o. Sex: female     No diagnosis found. DISPOSITION          MDM  Number of Diagnoses or Management Options  Diagnosis management comments: Diarrhea with some lightheadedness and some shortness of breath. Not much wheezing. Potential exacerbation of asthma but wonder if this is more fatigue from viral infection. Check EKG. Chest x-ray to assess for pneumonia or effusion. Screening lab work to assess for dehydration or kidney injury. Amount and/or Complexity of Data Reviewed  Clinical lab tests: ordered and reviewed  Tests in the radiology section of CPT®: ordered and reviewed  Tests in the medicine section of CPT®: ordered and reviewed  Decide to obtain previous medical records or to obtain history from someone other than the patient: (Dementia, hypertension, reactive airway disease.)  Review and summarize past medical records: yes  Independent visualization of images, tracings, or specimens: yes    Risk of Complications, Morbidity, and/or Mortality  Presenting problems: moderate  Diagnostic procedures: minimal  Management options: low    Patient Progress  Patient progress: stable             Orders Placed This Encounter   Procedures    XR CHEST PORTABLE    CBC with Auto Differential    Comprehensive Metabolic Panel    Magnesium    Cardiac Monitor - ED Only    Continuous Pulse Oximetry    Orthostatic blood pressure and pulse    EKG 12 Lead    Saline lock IV        Medications   0.9 % sodium chloride bolus (has no administration in time range)       New Prescriptions    No medications on file        Jolynn Massey is a 68 y.o. female who presents to the Emergency Department with chief complaint of    Chief Complaint   Patient presents with    Diarrhea    Shortness of Breath      26-year-old female brought in by family. Has some dementia but gives good pretty good history. States she got up and felt her usual self today but late morning, had 2 episodes of explosive, watery diarrhea without blood. Seem to be very fatigued and lightheadedness with shortness of breath. Did have some chills. No fever. Not really any cough. Perhaps minimal wheezing but shortness of breath feels different from her usual asthma issues. Minimal nausea. No vomiting. No chest pain. No sick contacts. No travel, recent antibiotics or unusual food. There was a change in medications over the last 2 days with starting rivastigmine    The history is provided by the patient and a relative. Diarrhea  Quality:  Explosive and watery  Severity:  Moderate  Onset quality:  Sudden  Number of episodes:  2  Timing:  Sporadic  Relieved by:  Nothing  Worsened by:  Nothing  Ineffective treatments:  None tried  Associated symptoms: abdominal pain and chills    Associated symptoms: no recent cough, no fever, no headaches, no myalgias and no vomiting        Review of Systems   Constitutional:  Positive for chills and fatigue. Negative for fever. Gastrointestinal:  Positive for abdominal pain and diarrhea. Negative for vomiting. Musculoskeletal:  Negative for myalgias. Neurological:  Positive for light-headedness. Negative for syncope, weakness and headaches. All other systems reviewed and are negative.     Past Medical History:   Diagnosis Date    Acid reflux     Arrhythmia 2013    palpitations     Arthritis     Asthma     followed by Dr. Samantha Stewart    Bronchitis     CAD (coronary artery disease)     hypertension    Essential hypertension 6/13/2016    GERD (gastroesophageal reflux disease)     Followed by GI    Gluten intolerance     H/O echocardiogram 2013    [facility]    H/O exercise stress test 2013    [facility]    Headache     Hearing loss     Hiatal hernia     Hypercholesterolemia     Hypothyroidism 12/6/2019    IBS (irritable bowel syndrome)     Memory difficulties 5/24/2018    Menopause Osteopenia 10/2/2015    Pap smear for cervical cancer screening 2012    Dr. Gayathri Chase    Plantar fasciitis     Supraventricular premature beats 6/23/2015    Vascular dementia [facility])         Past Surgical History:   Procedure Laterality Date    BREAST SURGERY      CATARACT REMOVAL  06/30/2014    CATARACT REMOVAL Bilateral 2014    COLONOSCOPY  2014    Dr Heber Prasad; 2003    GYN  2004    endometrial Ablation    HEENT Left 07/08/2019    laser eye surgery    HX OPEN REDUCTION INTERNAL FIXATION Right 1995    LAP,CHOLECYSTECTOMY      LAP,TUBAL CAUTERY      ORTHOPEDIC SURGERY Right     wrist pin placement    TONSILLECTOMY  1950    TUBAL LIGATION Bilateral 1981    BTL    WISDOM TOOTH EXTRACTION  1964        Family History   Problem Relation Age of Onset    Thyroid Disease Daughter         Hyperthyroidism status post thyroidectomy (benign)    Diabetes Brother     Cancer Sister         breast    Breast Cancer Sister         late 63's    Thyroid Disease Mother     Elevated Lipids Mother         Dyslipidemia    Colon Cancer Neg Hx     Diabetes Mother         63's    Hypertension Mother         63's    Heart Disease Mother         63's    Ovarian Cancer Neg Hx         Social History     Socioeconomic History    Marital status:      Spouse name: None    Number of children: None    Years of education: None    Highest education level: None   Tobacco Use    Smoking status: Never    Smokeless tobacco: Never   Vaping Use    Vaping Use: Never used   Substance and Sexual Activity    Alcohol use: No    Drug use: No    Sexual activity: Not Currently     Social Determinants of Health     Physical Activity: Inactive    Days of Exercise per Week: 0 days    Minutes of Exercise per Session: 0 min         Codeine, Morphine, Amoxicillin-pot clavulanate, Ciprofloxacin, Gluten, Meperidine, Sulfamethoxazole-trimethoprim, Telithromycin, Alendronate, Cefdinir, Cephalosporins, Gluten meal, Levothyroxine, Levothyroxine sodium, Loperamide, Loperamide hcl, Meperidine hcl, Promethazine, Sulfa antibiotics, and Esomeprazole magnesium     Previous Medications    ALBUTEROL SULFATE  (90 BASE) MCG/ACT INHALER    Inhale into the lungs    AMLODIPINE (NORVASC) 5 MG TABLET        ATORVASTATIN (LIPITOR) 10 MG TABLET    TAKE 1 TABLET BY MOUTH DAILY    AZELASTINE (ASTELIN) 0.1 % NASAL SPRAY    1 spray by Nasal route 2 times daily    CETIRIZINE (ZYRTEC) 10 MG TABLET    Take 10 mg by mouth daily    DICYCLOMINE (BENTYL) 10 MG CAPSULE    TAKE 1 CAPSULE BY MOUTH THREE TIMES DAILY AS NEEDED FOR DIARRHEA OR ABDOMINAL CRAMPS    ESCITALOPRAM (LEXAPRO) 10 MG TABLET    Take 1 tablet by mouth daily    FLUTICASONE (FLONASE) 50 MCG/ACT NASAL SPRAY    2 sprays by Nasal route daily    FLUTICASONE (FLOVENT HFA) 110 MCG/ACT INHALER    Inhale into the lungs every 12 hours    KETOTIFEN (ZADITOR) 0.025 % OPHTHALMIC SOLUTION    as needed    LEVOTHYROXINE (SYNTHROID) 50 MCG TABLET    Take 50 mcg by mouth every morning (before breakfast)    LIOTHYRONINE (CYTOMEL) 5 MCG TABLET    Take 5 mcg by mouth daily    MELOXICAM (MOBIC) 7.5 MG TABLET    Take 1 tablet by mouth daily as needed for Pain    MONTELUKAST (SINGULAIR) 10 MG TABLET    Take 10 mg by mouth daily    RIVASTIGMINE (EXELON) 3 MG CAPSULE    Take 1 capsule by mouth 2 times daily        Vitals signs and nursing note reviewed. Patient Vitals for the past 4 hrs:   Temp Pulse Resp BP SpO2   10/03/22 1509 98 °F (36.7 °C) 76 21 (!) 147/73 98 %          Physical Exam  Vitals and nursing note reviewed. Constitutional:       Appearance: She is not ill-appearing. HENT:      Head: Normocephalic and atraumatic. Right Ear: External ear normal.      Left Ear: External ear normal.      Mouth/Throat:      Mouth: Mucous membranes are moist.      Pharynx: Oropharynx is clear. Eyes:      General: No scleral icterus. Extraocular Movements: Extraocular movements intact.       Pupils: Pupils are equal, round, and reactive to light. Cardiovascular:      Rate and Rhythm: Normal rate and regular rhythm. Pulmonary:      Effort: Pulmonary effort is normal.      Breath sounds: Normal breath sounds. Abdominal:      Palpations: Abdomen is soft. Tenderness: There is no abdominal tenderness. Musculoskeletal:         General: No swelling or tenderness. Cervical back: Normal range of motion and neck supple. Right lower leg: No edema. Left lower leg: No edema. Skin:     General: Skin is warm and dry. Neurological:      General: No focal deficit present. Mental Status: She is alert. Comments: No focal weakness. Clear speech. Stood for orthostatics without issue. Procedures    Results for orders placed or performed during the hospital encounter of 10/03/22   XR CHEST PORTABLE    Narrative    EXAMINATION: XR CHEST PORTABLE 10/3/2022 3:28 PM    ACCESSION NUMBER: [id number]    COMPARISON: Chest radiograph 6/12/2020. INDICATION: Shortness of Breath    TECHNIQUE: A single view of the chest was obtained. FINDINGS:   No focal consolidation. No pulmonary edema. No pneumothorax or sizable pleural effusion. Stable cardiomediastinal silhouette. Surgical clips overlie the right upper quadrant. Impression    No acute airspace disease.        CBC with Auto Differential   Result Value Ref Range    WBC 8.5 4.3 - 11.1 K/uL    RBC 4.59 4.05 - 5.2 M/uL    Hemoglobin 14.0 11.7 - 15.4 g/dL    Hematocrit 40.2 35.8 - 46.3 %    MCV 87.6 79.6 - 97.8 FL    MCH 30.5 26.1 - 32.9 PG    MCHC 34.8 31.4 - 35.0 g/dL    RDW 12.8 11.9 - 14.6 %    Platelets 734 310 - 686 K/uL    MPV 10.6 9.4 - 12.3 FL    nRBC 0.00 0.0 - 0.2 K/uL    Differential Type AUTOMATED      Seg Neutrophils 80 (H) 43 - 78 %    Lymphocytes 13 13 - 44 %    Monocytes 6 4.0 - 12.0 %    Eosinophils % 0 (L) 0.5 - 7.8 %    Basophils 0 0.0 - 2.0 %    Immature Granulocytes 0 0.0 - 5.0 %    Segs Absolute 6.8 1.7 - 8.2 K/UL    Absolute Lymph # 1.1 0.5 - 4.6 K/UL    Absolute Mono # 0.5 0.1 - 1.3 K/UL    Absolute Eos # 0.0 0.0 - 0.8 K/UL    Basophils Absolute 0.0 0.0 - 0.2 K/UL    Absolute Immature Granulocyte 0.0 0.0 - 0.5 K/UL   EKG 12 Lead   Result Value Ref Range    Ventricular Rate 77 BPM    Atrial Rate 77 BPM    P-R Interval 166 ms    QRS Duration 92 ms    Q-T Interval 384 ms    QTc Calculation (Bazett) 434 ms    P Axis 45 degrees    R Axis -44 degrees    T Axis 31 degrees    Diagnosis Normal sinus rhythm         XR CHEST PORTABLE   Final Result   No acute airspace disease.                    Results Include:    Recent Results (from the past 24 hour(s))   EKG 12 Lead    Collection Time: 10/03/22  3:21 PM   Result Value Ref Range    Ventricular Rate 77 BPM    Atrial Rate 77 BPM    P-R Interval 166 ms    QRS Duration 92 ms    Q-T Interval 384 ms    QTc Calculation (Bazett) 434 ms    P Axis 45 degrees    R Axis -44 degrees    T Axis 31 degrees    Diagnosis Normal sinus rhythm    CBC with Auto Differential    Collection Time: 10/03/22  3:25 PM   Result Value Ref Range    WBC 8.5 4.3 - 11.1 K/uL    RBC 4.59 4.05 - 5.2 M/uL    Hemoglobin 14.0 11.7 - 15.4 g/dL    Hematocrit 40.2 35.8 - 46.3 %    MCV 87.6 79.6 - 97.8 FL    MCH 30.5 26.1 - 32.9 PG    MCHC 34.8 31.4 - 35.0 g/dL    RDW 12.8 11.9 - 14.6 %    Platelets 910 605 - 034 K/uL    MPV 10.6 9.4 - 12.3 FL    nRBC 0.00 0.0 - 0.2 K/uL    Differential Type AUTOMATED      Seg Neutrophils 80 (H) 43 - 78 %    Lymphocytes 13 13 - 44 %    Monocytes 6 4.0 - 12.0 %    Eosinophils % 0 (L) 0.5 - 7.8 %    Basophils 0 0.0 - 2.0 %    Immature Granulocytes 0 0.0 - 5.0 %    Segs Absolute 6.8 1.7 - 8.2 K/UL    Absolute Lymph # 1.1 0.5 - 4.6 K/UL    Absolute Mono # 0.5 0.1 - 1.3 K/UL    Absolute Eos # 0.0 0.0 - 0.8 K/UL    Basophils Absolute 0.0 0.0 - 0.2 K/UL    Absolute Immature Granulocyte 0.0 0.0 - 0.5 K/UL   Comprehensive Metabolic Panel    Collection Time: 10/03/22  3:25 PM   Result Value Ref Range    Sodium 139 136 - 145 mmol/L    Potassium 3.7 3.5 - 5.1 mmol/L    Chloride 105 101 - 110 mmol/L    CO2 27 21 - 32 mmol/L    Anion Gap 7 4 - 13 mmol/L    Glucose 161 (H) 65 - 100 mg/dL    BUN 15 8 - 23 MG/DL    Creatinine 1.12 (H) 0.6 - 1.0 MG/DL    Est, Glom Filt Rate 51 (L) >60 ml/min/1.73m2    Calcium 9.4 8.3 - 10.4 MG/DL    Total Bilirubin 0.4 0.2 - 1.1 MG/DL    ALT 23 12 - 65 U/L    AST 15 15 - 37 U/L    Alk Phosphatase 134 (H) 50 - 130 U/L    Total Protein 7.2 6.3 - 8.2 g/dL    Albumin 4.0 3.2 - 4.6 g/dL    Globulin 3.2 2.3 - 3.5 g/dL    Albumin/Globulin Ratio 1.3 1.2 - 3.5     Magnesium    Collection Time: 10/03/22  3:25 PM   Result Value Ref Range    Magnesium 2.3 1.8 - 2.4 mg/dL   Troponin    Collection Time: 10/03/22  3:25 PM   Result Value Ref Range    Troponin, High Sensitivity 5.7 0 - 14 pg/mL     5:36 PM  Minimal dyspnea. Good O2 sat on room air. Patient will use inhaler. Ambulated to the restroom without any obvious issues. Voice dictation software was used during the making of this note. This software is not perfect and grammatical and other typographical errors may be present. This note has not been completely proofread for errors.      Aggie Bateman MD  10/03/22 9721       Aggie Bateman MD  10/03/22 6538

## 2022-10-03 NOTE — DISCHARGE INSTRUCTIONS
Center over-the-counter Imodium for other episodes of diarrhea. Clear liquids if nauseated. Recheck with your doctor in 48 hours if not improving. Sooner for worse or worrisome symptoms.

## 2022-10-03 NOTE — ED TRIAGE NOTES
Patient brought in by EMS coming from home. Patient c\o d, chills, and shortness of breath. Patient states this began this morning. Patient states she has a history of asthma.       EMS 98%  150/80  64HR  165 BGL

## 2022-10-04 ENCOUNTER — CARE COORDINATION (OUTPATIENT)
Dept: CARE COORDINATION | Facility: CLINIC | Age: 77
End: 2022-10-04

## 2022-10-04 ENCOUNTER — TELEPHONE (OUTPATIENT)
Dept: FAMILY MEDICINE CLINIC | Facility: CLINIC | Age: 77
End: 2022-10-04

## 2022-10-04 LAB
EKG ATRIAL RATE: 77 BPM
EKG DIAGNOSIS: NORMAL
EKG P AXIS: 45 DEGREES
EKG P-R INTERVAL: 166 MS
EKG Q-T INTERVAL: 384 MS
EKG QRS DURATION: 92 MS
EKG QTC CALCULATION (BAZETT): 434 MS
EKG R AXIS: -44 DEGREES
EKG T AXIS: 31 DEGREES
EKG VENTRICULAR RATE: 77 BPM

## 2022-10-04 NOTE — CARE COORDINATION
Ambulatory Care Coordination Note  10/4/2022    ACC: Scott Birmingham RN    Ccm outreached to patient. Patient agreeable to ccm services. Reviewed with patient discharge summary. Patient verbalized understanding. Patient states diarrhea has improved. Patient state some SOB but overall it is improving. Patient does live with her daughter and has help as needed. Pcp was notified of er admission and need for follow up appointment. Patient states no other questions or concerns. Ccm discussed that I would outreach next week. Patient agreeable. Offered patient enrollment in the Remote Patient Monitoring (RPM) program for in-home monitoring: NA. Ambulatory Care Coordination Assessment    Care Coordination Protocol  Referral from Primary Care Provider: No  Week 1 - Initial Assessment     Do you have all of your prescriptions and are they filled?: Yes  Barriers to medication adherence: None  Are you able to afford your medications?: Yes     Do you have Home O2 Therapy?: No      Ability to seek help/take action for Emergent Urgent situations i.e. fire, crime, inclement weather or health crisis. : Independent  Ability to ambulate to restroom: Independent  Ability handle personal hygeine needs (bathing/dressing/grooming): Independent  Ability to manage Medications: Independent  Ability to prepare Food Preparation: Independent  Ability to maintain home (clean home, laundry): Independent  Ability to drive and/or has transportation: Independent  Ability to do shopping: Independent  Ability to manage finances:  Independent  Is patient able to live independently?: Yes     Current Housing: Private Residence        Per the Fall Risk Screening, did the patient have 2 or more falls or 1 fall with injury in the past year?: No     Frequent urination at night?: No  Do you use rails/bars?: No  Do you have a non-slip tub mat?: Yes     Are you experiencing loss of meaning?: No  Are you experiencing loss of hope and peace?: No Thinking about your patient's physical health needs, are there any symptoms or problems (risk indicators) you are unsure about that require further investigation?: No identified areas of uncertainly or problems already being investigated   Are the patients physical health problems impacting on their mental well-being?: No identified areas of concern   Are there any problems with your patients lifestyle behaviors (alcohol, drugs, diet, exercise) that are impacting on physical or mental well-being?: No identified areas of concern   Do you have any other concerns about your patients mental well-being? How would you rate their severity and impact on the patient?: No identified areas of concern   How would you rate their home environment in terms of safety and stability (including domestic violence, insecure housing, neighbor harassment)?: Consistently safe, supportive, stable, no identified problems   How do daily activities impact on the patient's well-being? (include current or anticipated unemployment, work, caregiving, access to transportation or other): No identified problems or perceived positive benefits   How would you rate their social network (family, work, friends)?: Good participation with social networks   How would you rate their financial resources (including ability to afford all required medical care)?: Financially secure, resources adequate, no identified problems   How wells does the patient now understand their health and well-being (symptoms, signs or risk factors) and what they need to do to manage their health?: Reasonable to good understanding and already engages in managing health or is willing to undertake better management   How well do you think your patient can engage in healthcare discussions?  (Barriers include language, deafness, aphasia, alcohol or drug problems, learning difficulties, concentration): Clear and open communication, no identified barriers   Do other services need to be involved to help this patient?: Other care/services not required at this time   Are current services involved with this patient well-coordinated? (Include coordination with other services you are now recommendation): All required care/services in place and well-coordinated   Suggested Interventions and Community Resources         Set up/Review Goals, Schedule an appointment with the patient's PCP, Set up/Review an Education Plan              Prior to Admission medications    Medication Sig Start Date End Date Taking?  Authorizing Provider   rivastigmine (EXELON) 3 MG capsule Take 1 capsule by mouth 2 times daily 9/29/22   HELENA Mathew   escitalopram (LEXAPRO) 10 MG tablet Take 1 tablet by mouth daily 9/29/22   HELENA Nguyen   azelastine (ASTELIN) 0.1 % nasal spray 1 spray by Nasal route 2 times daily    Historical Provider, MD   ketotifen (ZADITOR) 0.025 % ophthalmic solution as needed    Historical Provider, MD   amLODIPine (NORVASC) 5 MG tablet  8/23/22   Historical Provider, MD   meloxicam (MOBIC) 7.5 MG tablet Take 1 tablet by mouth daily as needed for Pain  Patient not taking: Reported on 9/29/2022 8/1/22   Kellie Herbert MD   albuterol sulfate  (90 Base) MCG/ACT inhaler Inhale into the lungs    Ar Automatic Reconciliation   atorvastatin (LIPITOR) 10 MG tablet TAKE 1 TABLET BY MOUTH DAILY 5/27/21   Ar Automatic Reconciliation   cetirizine (ZYRTEC) 10 MG tablet Take 10 mg by mouth daily    Ar Automatic Reconciliation   dicyclomine (BENTYL) 10 MG capsule TAKE 1 CAPSULE BY MOUTH THREE TIMES DAILY AS NEEDED FOR DIARRHEA OR ABDOMINAL CRAMPS 6/5/21   Ar Automatic Reconciliation   fluticasone (FLONASE) 50 MCG/ACT nasal spray 2 sprays by Nasal route daily    Ar Automatic Reconciliation   fluticasone (FLOVENT HFA) 110 MCG/ACT inhaler Inhale into the lungs every 12 hours  Patient not taking: Reported on 9/29/2022    Ar Automatic Reconciliation   levothyroxine (SYNTHROID) 50 MCG tablet Take 50 mcg by mouth every morning (before breakfast) 11/8/21   Ar Automatic Reconciliation   liothyronine (CYTOMEL) 5 MCG tablet Take 5 mcg by mouth daily 11/8/21   Ar Automatic Reconciliation   montelukast (SINGULAIR) 10 MG tablet Take 10 mg by mouth daily    Ar Automatic Reconciliation       Future Appointments   Date Time Provider Renita Dubois   10/26/2022 11:40 AM ENDO LAB END [facility] AMB   10/31/2022  9:40 AM Matt Cooper MD END [facility] AMB   11/1/2022 11:15 AM Trey Real MD [facility]   3/13/2023  2:00 PM [facility]   4/5/2023 11:30 AM DO ALICE Johnson [facility] AM[facility]   9/14/2023  9:45 AM Haresh Oliver MD [facility] AMB

## 2022-10-11 ENCOUNTER — CARE COORDINATION (OUTPATIENT)
Dept: CARE COORDINATION | Facility: CLINIC | Age: 77
End: 2022-10-11

## 2022-10-11 NOTE — CARE COORDINATION
Ambulatory Care Coordination Note  10/11/2022    ACC: Lemuel Campbell RN    Ccm outreached to patient. Patient states she is doing well. Patient states no abdominal pain at this time. Patient states no questions or concerns. Ccm discussed that I would outreach next week. Patient agreeable. Offered patient enrollment in the Remote Patient Monitoring (RPM) program for in-home monitoring: NA. Lab Results       None            Care Coordination Interventions    Referral from Primary Care Provider: No  Suggested Interventions and Community Resources          Goals Addressed                   This Visit's Progress     Conditions and Symptoms   On track     I will schedule office visits, as directed by my provider. I will keep my appointment or reschedule if I have to cancel. I will notify my provider of any barriers to my plan of care. I will notify my provider of any symptoms that indicate a worsening of my condition. Barriers: none  Plan for overcoming my barriers: N/A  Confidence: 9/10  Anticipated Goal Completion Date: 12/4/22                Prior to Admission medications    Medication Sig Start Date End Date Taking?  Authorizing Provider   rivastigmine (EXELON) 3 MG capsule Take 1 capsule by mouth 2 times daily 9/29/22   HELENA Guerra   escitalopram (LEXAPRO) 10 MG tablet Take 1 tablet by mouth daily 9/29/22   HELENA Gamez   azelastine (ASTELIN) 0.1 % nasal spray 1 spray by Nasal route 2 times daily    Historical Provider, MD   ketotifen (ZADITOR) 0.025 % ophthalmic solution as needed    Historical Provider, MD   amLODIPine (NORVASC) 5 MG tablet  8/23/22   Historical Provider, MD   meloxicam (MOBIC) 7.5 MG tablet Take 1 tablet by mouth daily as needed for Pain  Patient not taking: Reported on 9/29/2022 8/1/22   Nishant Baxter MD   albuterol sulfate  (90 Base) MCG/ACT inhaler Inhale into the lungs    Ar Automatic Reconciliation   atorvastatin (LIPITOR) 10 MG tablet TAKE 1 TABLET BY MOUTH DAILY 5/27/21   Ar Automatic Reconciliation   cetirizine (ZYRTEC) 10 MG tablet Take 10 mg by mouth daily    Ar Automatic Reconciliation   dicyclomine (BENTYL) 10 MG capsule TAKE 1 CAPSULE BY MOUTH THREE TIMES DAILY AS NEEDED FOR DIARRHEA OR ABDOMINAL CRAMPS 6/5/21   Ar Automatic Reconciliation   fluticasone (FLONASE) 50 MCG/ACT nasal spray 2 sprays by Nasal route daily    Ar Automatic Reconciliation   fluticasone (FLOVENT HFA) 110 MCG/ACT inhaler Inhale into the lungs every 12 hours  Patient not taking: Reported on 9/29/2022    Ar Automatic Reconciliation   levothyroxine (SYNTHROID) 50 MCG tablet Take 50 mcg by mouth every morning (before breakfast) 11/8/21   Ar Automatic Reconciliation   liothyronine (CYTOMEL) 5 MCG tablet Take 5 mcg by mouth daily 11/8/21   Ar Automatic Reconciliation   montelukast (SINGULAIR) 10 MG tablet Take 10 mg by mouth daily    Ar Automatic Reconciliation       Future Appointments   Date Time Provider Renita Dubois   10/26/2022 11:40 AM ENDO LAB END [facility] AM[facility]   10/31/2022  9:40 AM MD MIKE Boone [facility] AMB   11/1/2022 11:15 AM Zaki Patterson MD [facility] AM[facility]   3/13/2023  2:00 PM [facility]   4/5/2023 11:30 AM Ealna Mcnulty DO [facility] AMB   9/14/2023  9:45 AM [facility]

## 2022-10-15 ENCOUNTER — HOSPITAL ENCOUNTER (EMERGENCY)
Age: 77
Discharge: HOME | End: 2022-10-15
Attending: EMERGENCY MEDICINE
Payer: MEDICARE

## 2022-10-15 ENCOUNTER — HOSPITAL ENCOUNTER (EMERGENCY)
Dept: GENERAL RADIOLOGY | Age: 77
Discharge: HOME | End: 2022-10-18
Payer: MEDICARE

## 2022-10-15 VITALS
WEIGHT: 136 LBS | RESPIRATION RATE: 16 BRPM | OXYGEN SATURATION: 98 % | DIASTOLIC BLOOD PRESSURE: 76 MMHG | HEIGHT: 66 IN | SYSTOLIC BLOOD PRESSURE: 158 MMHG | HEART RATE: 75 BPM | BODY MASS INDEX: 21.86 KG/M2

## 2022-10-15 DIAGNOSIS — M54.50 ACUTE LEFT-SIDED LOW BACK PAIN, UNSPECIFIED WHETHER SCIATICA PRESENT: Primary | ICD-10-CM

## 2022-10-15 PROCEDURE — 72100 X-RAY EXAM L-S SPINE 2/3 VWS: CPT

## 2022-10-15 PROCEDURE — 96372 THER/PROPH/DIAG INJ SC/IM: CPT

## 2022-10-15 PROCEDURE — 6360000002 HC RX W HCPCS: Performed by: NURSE PRACTITIONER

## 2022-10-15 PROCEDURE — 99284 EMERGENCY DEPT VISIT MOD MDM: CPT

## 2022-10-15 PROCEDURE — 6370000000 HC RX 637 (ALT 250 FOR IP): Performed by: NURSE PRACTITIONER

## 2022-10-15 RX ORDER — METHYLPREDNISOLONE 4 MG/1
TABLET ORAL
Qty: 1 KIT | Refills: 0 | Status: SHIPPED | OUTPATIENT
Start: 2022-10-15 | End: 2022-10-21

## 2022-10-15 RX ORDER — METHOCARBAMOL 750 MG/1
750 TABLET, FILM COATED ORAL 3 TIMES DAILY PRN
Qty: 20 TABLET | Refills: 0 | Status: SHIPPED | OUTPATIENT
Start: 2022-10-15 | End: 2022-10-25

## 2022-10-15 RX ORDER — IBUPROFEN 800 MG/1
800 TABLET ORAL EVERY 8 HOURS PRN
Qty: 30 TABLET | Refills: 0 | Status: SHIPPED | OUTPATIENT
Start: 2022-10-15

## 2022-10-15 RX ORDER — DEXAMETHASONE SODIUM PHOSPHATE 10 MG/ML
8 INJECTION INTRAMUSCULAR; INTRAVENOUS
Status: COMPLETED | OUTPATIENT
Start: 2022-10-15 | End: 2022-10-15

## 2022-10-15 RX ORDER — METHOCARBAMOL 500 MG/1
1000 TABLET, FILM COATED ORAL
Status: COMPLETED | OUTPATIENT
Start: 2022-10-15 | End: 2022-10-15

## 2022-10-15 RX ORDER — IBUPROFEN 800 MG/1
800 TABLET ORAL EVERY 8 HOURS PRN
Qty: 30 TABLET | Refills: 0 | Status: SHIPPED | OUTPATIENT
Start: 2022-10-15 | End: 2022-10-15 | Stop reason: SDUPTHER

## 2022-10-15 RX ORDER — METHYLPREDNISOLONE 4 MG/1
TABLET ORAL
Qty: 1 KIT | Refills: 0 | Status: SHIPPED | OUTPATIENT
Start: 2022-10-15 | End: 2022-10-15 | Stop reason: SDUPTHER

## 2022-10-15 RX ORDER — LIDOCAINE 4 G/G
1 PATCH TOPICAL DAILY
Status: DISCONTINUED | OUTPATIENT
Start: 2022-10-15 | End: 2022-10-15 | Stop reason: HOSPADM

## 2022-10-15 RX ORDER — METHOCARBAMOL 750 MG/1
750 TABLET, FILM COATED ORAL 3 TIMES DAILY PRN
Qty: 20 TABLET | Refills: 0 | Status: SHIPPED | OUTPATIENT
Start: 2022-10-15 | End: 2022-10-15 | Stop reason: SDUPTHER

## 2022-10-15 RX ADMIN — DEXAMETHASONE SODIUM PHOSPHATE 8 MG: 10 INJECTION INTRAMUSCULAR; INTRAVENOUS at 16:44

## 2022-10-15 RX ADMIN — METHOCARBAMOL 1000 MG: 500 TABLET ORAL at 16:43

## 2022-10-15 ASSESSMENT — PAIN DESCRIPTION - LOCATION: LOCATION: BACK

## 2022-10-15 ASSESSMENT — ENCOUNTER SYMPTOMS
BACK PAIN: 1
ABDOMINAL PAIN: 0
BOWEL INCONTINENCE: 0

## 2022-10-15 ASSESSMENT — PAIN - FUNCTIONAL ASSESSMENT: PAIN_FUNCTIONAL_ASSESSMENT: 0-10

## 2022-10-15 ASSESSMENT — PAIN SCALES - GENERAL: PAINLEVEL_OUTOF10: 5

## 2022-10-15 ASSESSMENT — PAIN DESCRIPTION - ORIENTATION: ORIENTATION: LOWER

## 2022-10-15 NOTE — ED PROVIDER NOTES
Emergency Department Provider Note                   PCP:                Abilio Stinson MD               Age: 68 y.o. Sex: female       ICD-10-CM    1. Acute left-sided low back pain, unspecified whether sciatica present  M54.50           DISPOSITION Decision To Discharge 10/15/2022 06:18:49 PM        MDM  Number of Diagnoses or Management Options  Acute left-sided low back pain, unspecified whether sciatica present: new, needed workup  Diagnosis management comments: 59-year-old female who presents emergency department today with complaint of left lower back pain that radiates down the back of the left leg. Patient appears uncomfortable during exam but is in no acute distress. She has some tenderness on exam but otherwise her physical exam is unremarkable. Based on my evaluation I feel the patient is at low risk for alternative causes such as cauda equina, acute fracture, epidural abscess, pyelonephritis. The reasoning behind my decision process is that the patient is overall well appearing and in no acute distress noted. Vital signs are stable without hypoxia, tachycardia, tachypnea, hypotension, fever. There is no presence of lower extremity weakness, saddle paresthesia, loss of bowel/bladder functioning, urinary retention, decreased rectal tone. Patient did have some relief after treatment in the emergency department today. Encouraged rest, ice or heat, gentle stretching, massage, lidocaine patch, and NSAID. Will discharge with prescriptions for Medrol, Robaxin, and ibuprofen. I have discussed the results of all labs, procedures, radiographs, and/or treatments with the patient and available family members. Treatment plan is agreed upon by the patient and the patient is ready for discharge. Questions about treatment in the ED and differential diagnosis of presenting condition were answered.   Patient was given verbal discharge instructions including, but not limited to, importance of returning to the emergency department for any concern of worsening or continued symptoms. Instructions were given to follow-up with a primary care provider or specialist within 1 to 2 days. Adverse effects of medications, if prescribed, were discussed and the patient was advised to refrain from significant physical activity until followed up by a primary care physician and to not drive or operate heavy machinery after taking any sedating substances. Risk of Complications, Morbidity, and/or Mortality  Presenting problems: low  Diagnostic procedures: low  Management options: low    Patient Progress  Patient progress: improved             Orders Placed This Encounter   Procedures    XR LUMBAR SPINE (2-3 VIEWS)        Medications   methocarbamol (ROBAXIN) tablet 1,000 mg (1,000 mg Oral Given 10/15/22 1643)   dexamethasone (DECADRON) injection 8 mg (8 mg IntraMUSCular Given 10/15/22 1644)       Discharge Medication List as of 10/15/2022  6:23 PM        START taking these medications    Details   methylPREDNISolone (MEDROL, BENY,) 4 MG tablet Take by mouth., Disp-1 kit, R-0Normal      methocarbamol (ROBAXIN-750) 750 MG tablet Take 1 tablet by mouth 3 times daily as needed (pain), Disp-20 tablet, R-0Normal      ibuprofen (IBU) 800 MG tablet Take 1 tablet by mouth every 8 hours as needed for Pain, Disp-30 tablet, R-0Normal              Roc Doyle is a 68 y.o. female who presents to the Emergency Department with chief complaint of    Chief Complaint   Patient presents with    Back Pain    Leg Pain      75-year-old female who presents emergency department today with complaint of gradual onset of left lower back pain that radiates down the back of the left leg. Patient reports she has been having some pain for a few weeks but the pain has progressively worsened. She has been taking some ibuprofen with some relief of pain.   She states that today the pain got too severe and she was unable to walk to the restroom without significant pain. She denies any falls, trauma, or known injury. She denies any saddle paresthesia, urinary retention, bowel incontinence, numbness, weakness, leg swelling, or shortness of breath. The history is provided by the patient. Back Pain  Pain location: left lumbar. Quality:  Shooting  Radiates to:  L posterior upper leg, L knee and L foot  Pain severity:  Severe  Onset quality:  Gradual  Duration:  3 weeks  Timing:  Constant  Progression:  Worsening  Chronicity:  New  Relieved by:  Being still and NSAIDs  Worsened by:  Ambulation, bending and twisting  Ineffective treatments:  Heating pad  Associated symptoms: leg pain    Associated symptoms: no abdominal pain, no bladder incontinence, no bowel incontinence, no chest pain, no dysuria, no fever, no numbness, no paresthesias, no pelvic pain, no perianal numbness and no weakness        Review of Systems   Constitutional:  Negative for fever. Cardiovascular:  Negative for chest pain. Gastrointestinal:  Negative for abdominal pain and bowel incontinence. Genitourinary:  Negative for bladder incontinence, dysuria and pelvic pain. Musculoskeletal:  Positive for back pain. Neurological:  Negative for weakness, numbness and paresthesias. All other systems reviewed and are negative.     Past Medical History:   Diagnosis Date    Acid reflux     Arrhythmia 2013    palpitations     Arthritis     Asthma     followed by Dr. Rinku Moralez    Bronchitis     CAD (coronary artery disease)     hypertension    Essential hypertension 6/13/2016    GERD (gastroesophageal reflux disease)     Followed by GI    Gluten intolerance     H/O echocardiogram 2013    [facility]    H/O exercise stress test 2013    [facility]    Headache     Hearing loss     Hiatal hernia     Hypercholesterolemia     Hypothyroidism 12/6/2019    IBS (irritable bowel syndrome)     Memory difficulties 5/24/2018    Menopause     Osteopenia 10/2/2015    Pap smear for cervical cancer screening 2012    Dr. Sonia Patel    Plantar fasciitis     Supraventricular premature beats 6/23/2015    Vascular dementia [facility])         Past Surgical History:   Procedure Laterality Date    BREAST SURGERY      CATARACT REMOVAL  06/30/2014    CATARACT REMOVAL Bilateral 2014    COLONOSCOPY  2014    Dr Bobo Echavarria; 2003    GYN  2004    endometrial Ablation    HEENT Left 07/08/2019    laser eye surgery    HX OPEN REDUCTION INTERNAL FIXATION Right 1995    LAP,CHOLECYSTECTOMY      LAP,TUBAL CAUTERY      ORTHOPEDIC SURGERY Right     wrist pin placement    TONSILLECTOMY  1950    TUBAL LIGATION Bilateral 1981    BTL    WISDOM TOOTH EXTRACTION  1964        Family History   Problem Relation Age of Onset    Thyroid Disease Daughter         Hyperthyroidism status post thyroidectomy (benign)    Diabetes Brother     Cancer Sister         breast    Breast Cancer Sister         late 63's    Thyroid Disease Mother     Elevated Lipids Mother         Dyslipidemia    Colon Cancer Neg Hx     Diabetes Mother         63's    Hypertension Mother         63's    Heart Disease Mother         63's    Ovarian Cancer Neg Hx         Social History     Socioeconomic History    Marital status:      Spouse name: None    Number of children: None    Years of education: None    Highest education level: None   Tobacco Use    Smoking status: Never    Smokeless tobacco: Never   Vaping Use    Vaping Use: Never used   Substance and Sexual Activity    Alcohol use: No    Drug use: No    Sexual activity: Not Currently     Social Determinants of Health     Physical Activity: Inactive    Days of Exercise per Week: 0 days    Minutes of Exercise per Session: 0 min         Codeine, Morphine, Amoxicillin-pot clavulanate, Ciprofloxacin, Gluten, Meperidine, Sulfamethoxazole-trimethoprim, Telithromycin, Alendronate, Cefdinir, Cephalosporins, Gluten meal, Levothyroxine, Levothyroxine sodium, Loperamide, Loperamide hcl, Meperidine hcl, Promethazine, Sulfa antibiotics, and Esomeprazole magnesium     Discharge Medication List as of 10/15/2022  6:23 PM        CONTINUE these medications which have NOT CHANGED    Details   rivastigmine (EXELON) 3 MG capsule Take 1 capsule by mouth 2 times daily, Disp-60 capsule, R-3Normal      escitalopram (LEXAPRO) 10 MG tablet Take 1 tablet by mouth daily, Disp-90 tablet, R-1Normal      azelastine (ASTELIN) 0.1 % nasal spray 1 spray by Nasal route 2 times dailyHistorical Med      ketotifen (ZADITOR) 0.025 % ophthalmic solution as neededHistorical Med      amLODIPine (NORVASC) 5 MG tablet Historical Med      meloxicam (MOBIC) 7.5 MG tablet Take 1 tablet by mouth daily as needed for Pain, Disp-40 tablet, R-0Normal      albuterol sulfate  (90 Base) MCG/ACT inhaler Inhale into the lungsHistorical Med      atorvastatin (LIPITOR) 10 MG tablet TAKE 1 TABLET BY MOUTH DAILYHistorical Med      cetirizine (ZYRTEC) 10 MG tablet Take 10 mg by mouth dailyHistorical Med      dicyclomine (BENTYL) 10 MG capsule TAKE 1 CAPSULE BY MOUTH THREE TIMES DAILY AS NEEDED FOR DIARRHEA OR ABDOMINAL CRAMPSHistorical Med      fluticasone (FLONASE) 50 MCG/ACT nasal spray 2 sprays by Nasal route dailyHistorical Med      fluticasone (FLOVENT HFA) 110 MCG/ACT inhaler Inhale into the lungs every 12 hoursHistorical Med      levothyroxine (SYNTHROID) 50 MCG tablet Take 50 mcg by mouth every morning (before breakfast)Historical Med      liothyronine (CYTOMEL) 5 MCG tablet Take 5 mcg by mouth dailyHistorical Med      montelukast (SINGULAIR) 10 MG tablet Take 10 mg by mouth dailyHistorical Med              Vitals signs and nursing note reviewed. No data found. Physical Exam  Vitals and nursing note reviewed. Constitutional:       General: She is not in acute distress. Appearance: Normal appearance. She is well-developed. She is not ill-appearing, toxic-appearing or diaphoretic.    HENT:      Head: Normocephalic and atraumatic. Mouth/Throat:      Mouth: Mucous membranes are moist.   Eyes:      General: No scleral icterus. Extraocular Movements: Extraocular movements intact. Cardiovascular:      Rate and Rhythm: Normal rate. Pulses:           Dorsalis pedis pulses are 2+ on the right side and 2+ on the left side. Posterior tibial pulses are 2+ on the right side and 2+ on the left side. Pulmonary:      Effort: Pulmonary effort is normal. No respiratory distress. Abdominal:      General: Abdomen is flat. There is no distension. Musculoskeletal:      Cervical back: Normal.      Thoracic back: Normal.      Lumbar back: Tenderness present. No bony tenderness. Normal range of motion. Back:       Right lower leg: No edema. Left lower leg: No edema. Comments: Tenderness with palpation to left lumbar paraspinal region. No midline tenderness with palpation of cervical, thoracic, or lumbar spine. Skin:     General: Skin is warm and dry. Capillary Refill: Capillary refill takes less than 2 seconds. Neurological:      General: No focal deficit present. Mental Status: She is alert and oriented to person, place, and time. Psychiatric:         Mood and Affect: Mood normal.         Behavior: Behavior normal.        Procedures    Results for orders placed or performed during the hospital encounter of 10/15/22   XR LUMBAR SPINE (2-3 VIEWS)    Narrative    LUMBAR SPINE AP AND LATERAL VIEWS    HISTORY:  Chronic low back pain for several weeks;    COMPARISON:  9/26/2019    FINDINGS:  Multilevel lumbar degenerative spondylosis is present. The lumbar  vertebrae are normal in height and alignment. There are no pars defects. Impression    Multilevel degenerative spondylosis. XR LUMBAR SPINE (2-3 VIEWS)   Final Result   Multilevel degenerative spondylosis. Voice dictation software was used during the making of this note.   This software is not perfect and grammatical and other typographical errors may be present. This note has not been completely proofread for errors.        Rocky Gregory, APRN - [id number]  10/15/22 [id number]

## 2022-10-15 NOTE — ED NOTES
I have reviewed discharge instructions with the patient. The patient verbalized understanding. Patient left ED via Discharge Method: wheelchair to Home with family. Opportunity for questions and clarification provided. Patient given 3 scripts.           Garrett Fleming RN  10/15/22 0916

## 2022-10-15 NOTE — ED TRIAGE NOTES
Pt BIB from home. Pt has had chronic left lower back back for several weeks that has increased over the past couple days. Pain worsens with standing and movement.

## 2022-10-15 NOTE — DISCHARGE INSTRUCTIONS
Take medication as prescribed. Apply ice or heat for 10 to 15 minutes 3-4 times a day. A lot of people find that ice is more helpful but whichever provides you with more relief is fine. Massage area with over-the-counter muscle rub may also help alleviate pain. Perform frequent, gentle stretching of your lower back. Apply an over-the-counter patch containing lidocaine. Follow-up with your primary care provider. Return to the emergency department for any new, worsening, or concerning symptoms.

## 2022-10-17 ENCOUNTER — CARE COORDINATION (OUTPATIENT)
Dept: CARE COORDINATION | Facility: CLINIC | Age: 77
End: 2022-10-17

## 2022-10-17 NOTE — CARE COORDINATION
Ambulatory Care Management Note        Date/Time:  10/17/2022 11:19 AM    Ccm outreached to patient. No answer at this time, message left. Awaiting response. If no response,ccm will outreach next week.

## 2022-10-24 ENCOUNTER — CARE COORDINATION (OUTPATIENT)
Dept: CARE COORDINATION | Facility: CLINIC | Age: 77
End: 2022-10-24

## 2022-10-24 NOTE — CARE COORDINATION
Ambulatory Care Management Note        Date/Time:  10/24/2022 10:48 AM    Ccm outreached to patient. No answer at this time, message left. Awaiting response. If  no response, ccm will outreach next week.

## 2022-10-26 DIAGNOSIS — M51.36 DEGENERATIVE DISC DISEASE, LUMBAR: Primary | ICD-10-CM

## 2022-10-26 NOTE — TELEPHONE ENCOUNTER
Hospitalist from [facility], Dr. Sandoval Aguilera, called stating that patient will be discharged and will need some Tramadol and Oral Diladid to cover the time until she gets in for hospital f/u. She was admitted for spinal fracture and bulging disk. Please call her back at [phone].

## 2022-10-27 RX ORDER — TRAMADOL HYDROCHLORIDE 50 MG/1
50 TABLET ORAL EVERY 6 HOURS PRN
Qty: 40 TABLET | Refills: 0 | Status: SHIPPED | OUTPATIENT
Start: 2022-10-27 | End: 2022-11-06

## 2022-10-31 ENCOUNTER — CARE COORDINATION (OUTPATIENT)
Dept: CARE COORDINATION | Facility: CLINIC | Age: 77
End: 2022-10-31

## 2022-10-31 NOTE — CARE COORDINATION
Historical Provider, MD   meloxicam (MOBIC) 7.5 MG tablet Take 1 tablet by mouth daily as needed for Pain  Patient not taking: Reported on 9/29/2022 8/1/22   Sharmila More MD   albuterol sulfate  (90 Base) MCG/ACT inhaler Inhale into the lungs    Ar Automatic Reconciliation   atorvastatin (LIPITOR) 10 MG tablet TAKE 1 TABLET BY MOUTH DAILY 5/27/21   Ar Automatic Reconciliation   cetirizine (ZYRTEC) 10 MG tablet Take 10 mg by mouth daily    Ar Automatic Reconciliation   dicyclomine (BENTYL) 10 MG capsule TAKE 1 CAPSULE BY MOUTH THREE TIMES DAILY AS NEEDED FOR DIARRHEA OR ABDOMINAL CRAMPS 6/5/21   Ar Automatic Reconciliation   fluticasone (FLONASE) 50 MCG/ACT nasal spray 2 sprays by Nasal route daily    Ar Automatic Reconciliation   fluticasone (FLOVENT HFA) 110 MCG/ACT inhaler Inhale into the lungs every 12 hours  Patient not taking: Reported on 9/29/2022    Ar Automatic Reconciliation   levothyroxine (SYNTHROID) 50 MCG tablet Take 50 mcg by mouth every morning (before breakfast) 11/8/21   Ar Automatic Reconciliation   liothyronine (CYTOMEL) 5 MCG tablet Take 5 mcg by mouth daily 11/8/21   Ar Automatic Reconciliation   montelukast (SINGULAIR) 10 MG tablet Take 10 mg by mouth daily    Ar Automatic Reconciliation       Future Appointments   Date Time Provider Renita Dubois   11/4/2022 11:30 AM MD ANDRIY Tafoya [facility] AM[facility]   12/6/2022  3:45 PM Tiff Mckeon MD [facility]   3/13/2023  2:00 PM MD JAGRUTI Tafoya [facility] AMB   4/5/2023 11:30 AM DO VICKY Simms [facility]   4/19/2023  3:20 PM MD MIKE Parks [facility]   9/14/2023  9:45 AM MD ANDRIY Tafoya [facility] AM[facility]

## 2022-11-01 ENCOUNTER — CARE COORDINATION (OUTPATIENT)
Dept: CARE COORDINATION | Facility: CLINIC | Age: 77
End: 2022-11-01

## 2022-11-01 ENCOUNTER — TELEPHONE (OUTPATIENT)
Dept: FAMILY MEDICINE CLINIC | Facility: CLINIC | Age: 77
End: 2022-11-01

## 2022-11-01 NOTE — TELEPHONE ENCOUNTER
Rosalind with [facility] needs verbal order to treat patient. Please call her at [phone].

## 2022-11-04 ENCOUNTER — TELEPHONE (OUTPATIENT)
Dept: FAMILY MEDICINE CLINIC | Facility: CLINIC | Age: 77
End: 2022-11-04

## 2022-11-04 NOTE — TELEPHONE ENCOUNTER
Rafia Sweet at [facility] needs verbal approval for plan of care. Please call him at [phone].

## 2022-11-07 ENCOUNTER — CARE COORDINATION (OUTPATIENT)
Dept: CARE COORDINATION | Facility: CLINIC | Age: 77
End: 2022-11-07

## 2022-11-07 NOTE — CARE COORDINATION
Ambulatory Care Coordination Note  11/7/2022    ACC: Wandy Eubanks, RN    Ccm outreached to patient. Patient states she is doing well. Patient states no questions or concerns. Ccm discussed that I would outreach next week. Patient agreeable. Offered patient enrollment in the Remote Patient Monitoring (RPM) program for in-home monitoring: NA. Lab Results       None            Care Coordination Interventions    Referral from Primary Care Provider: No  Suggested Interventions and Community Resources          Goals Addressed                   This Visit's Progress     Conditions and Symptoms   On track     I will schedule office visits, as directed by my provider. I will keep my appointment or reschedule if I have to cancel. I will notify my provider of any barriers to my plan of care. I will notify my provider of any symptoms that indicate a worsening of my condition. Barriers: none  Plan for overcoming my barriers: N/A  Confidence: 9/10  Anticipated Goal Completion Date: 12/4/22                Prior to Admission medications    Medication Sig Start Date End Date Taking?  Authorizing Provider   ibuprofen (IBU) 800 MG tablet Take 1 tablet by mouth every 8 hours as needed for Pain 10/15/22   HELENA Milian - CNP   rivastigmine (EXELON) 3 MG capsule Take 1 capsule by mouth 2 times daily 9/29/22   HELENA Ruby   escitalopram (LEXAPRO) 10 MG tablet Take 1 tablet by mouth daily 9/29/22   HELENA Peterson   azelastine (ASTELIN) 0.1 % nasal spray 1 spray by Nasal route 2 times daily    Historical Provider, MD   ketotifen (ZADITOR) 0.025 % ophthalmic solution as needed    Historical Provider, MD   amLODIPine (NORVASC) 5 MG tablet  8/23/22   Historical Provider, MD   meloxicam (MOBIC) 7.5 MG tablet Take 1 tablet by mouth daily as needed for Pain  Patient not taking: Reported on 9/29/2022 8/1/22   Susan Pierce., MD   albuterol sulfate  (90 Base) MCG/ACT inhaler Inhale into the lungs Ar Automatic Reconciliation   atorvastatin (LIPITOR) 10 MG tablet TAKE 1 TABLET BY MOUTH DAILY 5/27/21   Ar Automatic Reconciliation   cetirizine (ZYRTEC) 10 MG tablet Take 10 mg by mouth daily    Ar Automatic Reconciliation   dicyclomine (BENTYL) 10 MG capsule TAKE 1 CAPSULE BY MOUTH THREE TIMES DAILY AS NEEDED FOR DIARRHEA OR ABDOMINAL CRAMPS 6/5/21   Ar Automatic Reconciliation   fluticasone (FLONASE) 50 MCG/ACT nasal spray 2 sprays by Nasal route daily    Ar Automatic Reconciliation   fluticasone (FLOVENT HFA) 110 MCG/ACT inhaler Inhale into the lungs every 12 hours  Patient not taking: Reported on 9/29/2022    Ar Automatic Reconciliation   levothyroxine (SYNTHROID) 50 MCG tablet Take 50 mcg by mouth every morning (before breakfast) 11/8/21   Ar Automatic Reconciliation   liothyronine (CYTOMEL) 5 MCG tablet Take 5 mcg by mouth daily 11/8/21   Ar Automatic Reconciliation   montelukast (SINGULAIR) 10 MG tablet Take 10 mg by mouth daily    Ar Automatic Reconciliation       Future Appointments   Date Time Provider Renita Dubois   12/6/2022  3:45 PM Alfonzo Pratt MD [facility]   12/12/2022  1:30 PM Lora Parker MD [facility] AMB   3/13/2023  2:00 PM Lora Parker MD [facility] AMB   4/5/2023 11:30 AM Louise Trevino [facility] (-) Neurology -   4/19/2023  3:20 PM Domingo Kovacs MD [facility] AMB   9/14/2023  9:45 AM Lora Parker MD [facility] AM[facility]

## 2022-11-14 ENCOUNTER — CARE COORDINATION (OUTPATIENT)
Dept: CARE COORDINATION | Facility: CLINIC | Age: 77
End: 2022-11-14

## 2022-11-14 NOTE — CARE COORDINATION
Ambulatory Care Coordination Note  11/14/2022    ACC: Raúl Dowling, COLTON    Ccm outreached to patient. Patient states she is doing well overall. Patient states no questions or concerns. Ccm discussed that I would outreach next week. Patent agreeable. Offered patient enrollment in the Remote Patient Monitoring (RPM) program for in-home monitoring: NA. Lab Results       None            Care Coordination Interventions    Referral from Primary Care Provider: No  Suggested Interventions and Community Resources          Goals Addressed                   This Visit's Progress     Conditions and Symptoms   On track     I will schedule office visits, as directed by my provider. I will keep my appointment or reschedule if I have to cancel. I will notify my provider of any barriers to my plan of care. I will notify my provider of any symptoms that indicate a worsening of my condition. Barriers: none  Plan for overcoming my barriers: N/A  Confidence: 9/10  Anticipated Goal Completion Date: 12/4/22                Prior to Admission medications    Medication Sig Start Date End Date Taking?  Authorizing Provider   ibuprofen (IBU) 800 MG tablet Take 1 tablet by mouth every 8 hours as needed for Pain 10/15/22   HELENA Barboza - CNP   rivastigmine (EXELON) 3 MG capsule Take 1 capsule by mouth 2 times daily 9/29/22   HELENA Valentine   escitalopram (LEXAPRO) 10 MG tablet Take 1 tablet by mouth daily 9/29/22   HELENA Campos   azelastine (ASTELIN) 0.1 % nasal spray 1 spray by Nasal route 2 times daily    Historical Provider, MD   ketotifen (ZADITOR) 0.025 % ophthalmic solution as needed    Historical Provider, MD   amLODIPine (NORVASC) 5 MG tablet  8/23/22   Historical Provider, MD   meloxicam (MOBIC) 7.5 MG tablet Take 1 tablet by mouth daily as needed for Pain  Patient not taking: Reported on 9/29/2022 8/1/22   Jasmina Martin MD   albuterol sulfate  (90 Base) MCG/ACT inhaler Inhale into the lungs    Ar Automatic Reconciliation   atorvastatin (LIPITOR) 10 MG tablet TAKE 1 TABLET BY MOUTH DAILY 5/27/21   Ar Automatic Reconciliation   cetirizine (ZYRTEC) 10 MG tablet Take 10 mg by mouth daily    Ar Automatic Reconciliation   dicyclomine (BENTYL) 10 MG capsule TAKE 1 CAPSULE BY MOUTH THREE TIMES DAILY AS NEEDED FOR DIARRHEA OR ABDOMINAL CRAMPS 6/5/21   Ar Automatic Reconciliation   fluticasone (FLONASE) 50 MCG/ACT nasal spray 2 sprays by Nasal route daily    Ar Automatic Reconciliation   fluticasone (FLOVENT HFA) 110 MCG/ACT inhaler Inhale into the lungs every 12 hours  Patient not taking: Reported on 9/29/2022    Ar Automatic Reconciliation   levothyroxine (SYNTHROID) 50 MCG tablet Take 50 mcg by mouth every morning (before breakfast) 11/8/21   Ar Automatic Reconciliation   liothyronine (CYTOMEL) 5 MCG tablet Take 5 mcg by mouth daily 11/8/21   Ar Automatic Reconciliation   montelukast (SINGULAIR) 10 MG tablet Take 10 mg by mouth daily    Ar Automatic Reconciliation       Future Appointments   Date Time Provider Renita Dubois   12/6/2022  3:45 PM Padilla Gandhi MD [facility]   12/12/2022  1:30 PM Jennifer Caballero MD [facility] AMB   3/13/2023  2:00 PM MD ANDRIY Jones [facility] AMB   4/5/2023 11:30 AM Evelyne Venegas [facility] (2-) Neurology -   4/19/2023  3:20 PM Hola Saeed MD [facility] AMB   9/14/2023  9:45 AM Jennifer Caballero MD [facility] AM[facility]

## 2022-11-21 ENCOUNTER — CARE COORDINATION (OUTPATIENT)
Dept: CARE COORDINATION | Facility: CLINIC | Age: 77
End: 2022-11-21

## 2022-11-21 NOTE — CARE COORDINATION
Ambulatory Care Management Note        Date/Time:  11/21/2022 9:45 AM    Ccm outreached to patient. No answer at this time, message left. Awaiting response. If no response, ccm will outreach next week.

## 2022-11-22 ENCOUNTER — TELEPHONE (OUTPATIENT)
Dept: FAMILY MEDICINE CLINIC | Facility: CLINIC | Age: 77
End: 2022-11-22

## 2022-11-22 NOTE — TELEPHONE ENCOUNTER
Spoke with patient and she requested a records request form to be mailed to her.  Mailed 11/22/22.      ----- Message from John Patton MA sent at 11/21/2022  3:43 PM EST -----  QUESTIONS   Information for Provider? pt will be being seen for primary care through   [facility] and is requested to have her records faxed to them   ---------------------------------------------------------------------------

## 2022-11-28 ENCOUNTER — CARE COORDINATION (OUTPATIENT)
Dept: CARE COORDINATION | Facility: CLINIC | Age: 77
End: 2022-11-28

## 2022-11-28 NOTE — CARE COORDINATION
Patient has graduated from the Complex Case Management  program on 11/28/22. Patient/family has the ability to self-manage at this time. Care management goals have been completed. No further Ambulatory Care Manager follow up scheduled. Goals Addressed                   This Visit's Progress     COMPLETED: Conditions and Symptoms        I will schedule office visits, as directed by my provider. I will keep my appointment or reschedule if I have to cancel. I will notify my provider of any barriers to my plan of care. I will notify my provider of any symptoms that indicate a worsening of my condition. Barriers: none  Plan for overcoming my barriers: N/A  Confidence: 9/10  Anticipated Goal Completion Date: 12/4/22                Patient has Ambulatory Care Manager's contact information for any further questions, concerns, or needs.   Patients upcoming visits:    Future Appointments   Date Time Provider Renita Dubois   12/6/2022  3:45 PM [facility]   4/5/2023 11:30 AM Evelyne Venegas [facility] ([facility] Neurology -   4/19/2023  3:20 PM Hola Saeed MD [facility] AMB

## 2023-02-21 ENCOUNTER — TELEPHONE (OUTPATIENT)
Dept: ENDOCRINOLOGY | Age: 78
End: 2023-02-21

## 2023-02-21 DIAGNOSIS — E03.9 PRIMARY HYPOTHYROIDISM: Primary | ICD-10-CM

## 2023-02-21 RX ORDER — LEVOTHYROXINE SODIUM 0.05 MG/1
50 TABLET ORAL
Qty: 90 TABLET | Refills: 0 | Status: SHIPPED | OUTPATIENT
Start: 2023-02-21

## 2023-02-21 RX ORDER — LIOTHYRONINE SODIUM 5 UG/1
5 TABLET ORAL DAILY
Qty: 90 TABLET | Refills: 0 | Status: SHIPPED | OUTPATIENT
Start: 2023-02-21

## 2023-02-21 NOTE — TELEPHONE ENCOUNTER
Patient's daughter called and stated that she has left a message for a refill on Levothyroxine 50 mcg and Liothyronine 5 mcg sent to [facility] on [address]. She stated that her mom is completely out of meds.

## 2023-03-06 NOTE — PROGRESS NOTES
3/7/2023    Ronal Silva Alsop  1945      PCP: Chris Shafer MD  Patient does see them for regular preventative visits. HPI: 66y.o. year old No obstetric history on file. Here for annual gyn wellness exam.   Pt now has her daughter living with her. Does not drive very much anymore. Has a separate apt area with walk in shower. Still reports her pessary is working well for her. Takes it out most nights while sleeping. Has early dementia. Reports happy memories from childhood, but significant short term mem loss. Uses a walker at home. Not sexually active. No LMP recorded. Patient is postmenopausal.    Social History     Socioeconomic History    Marital status:      Spouse name: None    Number of children: None    Years of education: None    Highest education level: None   Tobacco Use    Smoking status: Never    Smokeless tobacco: Never   Vaping Use    Vaping Use: Never used   Substance and Sexual Activity    Alcohol use: No    Drug use: No    Sexual activity: Not Currently     Social Determinants of Health     Physical Activity: Inactive    Days of Exercise per Week: 0 days    Minutes of Exercise per Session: 0 min     Last pap - no hx abnl paps. No longer doing them  Lipids- followed by pcp. On a statin  COVID vaccine has had  Colonoscopy- no longer needs  DEXA- osteoporosis. Not on a med? She told us she was on fosamax in 2019, but not listed in current meds. From Dr Lucy Goldberg note in late 2019, I see she has declined treatment. Mammogram- normal 8-2022    Allergies, medications, past medical and surgical history, social history, family history all reviewed.      Review of Systems  + for wt loss, loss of hearing, muscle weakness, urine leakage    Constitutional:  Denies unexplained weight gain or heat/ cold intolerance/loss of balance  ENT: Denies blurred vision, loss of hearing, hoarseness  Cardiovascular:  Denies chest pain, swelling in legs or feet, shortness of breath when lying flat  Respiratory:  Denies shortness of breath, cough greater than 2 weeks or coughing up blood  Gastro: Denies diarrhea greater than 2 weeks, rectal bleeding, bloody stools, heartburn, or constipation  :  Denies blood in urine, nocturia, dysuria   Breast:  Denies nipple discharge, masses or pain  Skin:  Denies rash greater than 2 weeks, change in moles  Musculoskeletal/Neuro:  Denies joint pain, seizures, headaches  Psych:  Denies new onset depression, anxiety, panic attacks  Heme:  Denies easy bruising, bleeding gums, frequent nosebleeds or swollen lymph nodes  GYN:  Denies bleeding or spotting between menses, heavy menses, menses longer than 7 days, pain with sex, severe menstrual cramps, bleeding after sex    Patient referred to a PCP for any significant nongyn findings on ROS. BP (!) 152/72   Wt 124 lb (56.2 kg)   BMI 20.01 kg/m²     Body mass index is 20.01 kg/m². Wt Readings from Last 3 Encounters:   03/07/23 124 lb (56.2 kg)   10/15/22 136 lb (61.7 kg)   10/03/22 144 lb (65.3 kg)         Pt in no distress. Alert and oriented x3. Affect bright. Well developed, well nourished. HEENT: normocephalic, atraumatic. Sclerae nonicteric. Neck supple w/o thyromegaly. Trachea midline. Lungs:  Respiratory effort normal.   Breasts: no masses or axillary lymphadenopathy  Abdomen: soft and nontender, no masses, no organomegaly, no ventral hernia  Pelvic: normal external genitalia, urethral meatus. Vagina pale. Large cystocele. No pressure sores in vagina. Cx with at least grade 1 prolapse. Bimanual exam w/o obvious masses or tenderness. Bladder nontender. Uterus normal size. Adnexae normal.  Perineum and anus normal. No hemorrhoids. Josef Kumar was seen today for annual exam.    Diagnoses and all orders for this visit:    Well woman exam with routine gynecological exam    Other female genital prolapse    Dense breast tissue on mammogram  -     [facility] DIGITAL SCREEN BILATERAL;  Future    Encounter for screening mammogram for breast cancer  -     [facility] DIGITAL SCREEN BILATERAL; Future       Pt counseling:   Doing great with self management of the pessary. Rtn 1yr or prn. D/w her today being very careful about fall risks. Rec she use the walker extensively.      Tammy Vanessa MD

## 2023-03-07 ENCOUNTER — OFFICE VISIT (OUTPATIENT)
Dept: OBGYN CLINIC | Age: 78
End: 2023-03-07
Payer: MEDICARE

## 2023-03-07 VITALS — SYSTOLIC BLOOD PRESSURE: 152 MMHG | DIASTOLIC BLOOD PRESSURE: 72 MMHG | BODY MASS INDEX: 20.01 KG/M2 | WEIGHT: 124 LBS

## 2023-03-07 DIAGNOSIS — Z12.31 ENCOUNTER FOR SCREENING MAMMOGRAM FOR BREAST CANCER: ICD-10-CM

## 2023-03-07 DIAGNOSIS — R92.2 DENSE BREAST TISSUE ON MAMMOGRAM: ICD-10-CM

## 2023-03-07 DIAGNOSIS — Z01.419 WELL WOMAN EXAM WITH ROUTINE GYNECOLOGICAL EXAM: Primary | ICD-10-CM

## 2023-03-07 DIAGNOSIS — N81.89 OTHER FEMALE GENITAL PROLAPSE: ICD-10-CM

## 2023-03-07 PROBLEM — K21.9 GASTROESOPHAGEAL REFLUX DISEASE: Status: ACTIVE | Noted: 2022-03-29

## 2023-03-07 PROBLEM — R93.5 ABNORMAL CT OF THE ABDOMEN: Status: ACTIVE | Noted: 2022-10-26

## 2023-03-07 PROBLEM — S22.000A THORACIC COMPRESSION FRACTURE, CLOSED, INITIAL ENCOUNTER (HCC): Status: ACTIVE | Noted: 2022-11-28

## 2023-03-07 PROBLEM — C18.1 ADENOCARCINOMA OF APPENDIX (HCC): Status: ACTIVE | Noted: 2023-01-05

## 2023-03-07 PROBLEM — H83.09 LABYRINTHITIS: Status: ACTIVE | Noted: 2023-03-07

## 2023-03-07 PROBLEM — R76.8 ANA POSITIVE: Status: ACTIVE | Noted: 2022-10-26

## 2023-03-07 PROBLEM — R79.89 ELEVATED LFTS: Status: ACTIVE | Noted: 2022-10-26

## 2023-03-07 PROBLEM — M54.42 ACUTE LEFT-SIDED LOW BACK PAIN WITH LEFT-SIDED SCIATICA: Status: ACTIVE | Noted: 2022-10-16

## 2023-03-07 PROBLEM — K76.89 LIVER CYST: Status: ACTIVE | Noted: 2022-10-26

## 2023-03-07 PROBLEM — K90.41 GLUTEN INTOLERANCE: Status: ACTIVE | Noted: 2023-03-07

## 2023-03-07 PROBLEM — M51.26 LUMBAR HERNIATED DISC: Status: ACTIVE | Noted: 2022-11-28

## 2023-03-07 PROBLEM — E87.1 HYPONATREMIA: Status: ACTIVE | Noted: 2022-10-16

## 2023-03-07 PROBLEM — G62.9 NEUROPATHY: Status: ACTIVE | Noted: 2022-01-01

## 2023-03-07 PROBLEM — R53.81 DEBILITY: Status: ACTIVE | Noted: 2022-10-28

## 2023-03-07 PROBLEM — E03.9 ACQUIRED HYPOTHYROIDISM: Status: ACTIVE | Noted: 2019-12-06

## 2023-03-07 PROBLEM — K44.9 HIATAL HERNIA: Status: ACTIVE | Noted: 2023-03-07

## 2023-03-07 PROBLEM — F01.A0 MILD VASCULAR DEMENTIA WITHOUT BEHAVIORAL DISTURBANCE, PSYCHOTIC DISTURBANCE, MOOD DISTURBANCE, OR ANXIETY (HCC): Status: ACTIVE | Noted: 2022-03-29

## 2023-03-07 PROBLEM — E55.9 VITAMIN D DEFICIENCY: Status: ACTIVE | Noted: 2022-10-26

## 2023-03-07 PROBLEM — R73.9 HYPERGLYCEMIA: Status: ACTIVE | Noted: 2022-10-16

## 2023-03-07 PROBLEM — M80.00XA AGE-RELATED OSTEOPOROSIS WITH CURRENT PATHOLOGICAL FRACTURE: Status: ACTIVE | Noted: 2017-01-24

## 2023-03-07 PROCEDURE — G8427 DOCREV CUR MEDS BY ELIG CLIN: HCPCS | Performed by: OBSTETRICS & GYNECOLOGY

## 2023-03-07 PROCEDURE — G0101 CA SCREEN;PELVIC/BREAST EXAM: HCPCS | Performed by: OBSTETRICS & GYNECOLOGY

## 2023-03-07 PROCEDURE — G8420 CALC BMI NORM PARAMETERS: HCPCS | Performed by: OBSTETRICS & GYNECOLOGY

## 2023-03-07 RX ORDER — NALOXONE HYDROCHLORIDE 4 MG/.1ML
SPRAY NASAL
COMMUNITY
Start: 2022-10-26

## 2023-03-07 RX ORDER — HYDROMORPHONE HYDROCHLORIDE 2 MG/1
TABLET ORAL
COMMUNITY
Start: 2022-11-15 | End: 2023-03-07

## 2023-03-07 RX ORDER — PANTOPRAZOLE SODIUM 40 MG/1
TABLET, DELAYED RELEASE ORAL
COMMUNITY
Start: 2023-02-18 | End: 2023-03-07

## 2023-03-07 RX ORDER — ONDANSETRON 4 MG/1
TABLET, ORALLY DISINTEGRATING ORAL
COMMUNITY
Start: 2023-02-15

## 2023-03-07 RX ORDER — TRIAMCINOLONE ACETONIDE 1 MG/G
1 CREAM TOPICAL PRN
COMMUNITY

## 2023-03-07 RX ORDER — TRAMADOL HYDROCHLORIDE 50 MG/1
TABLET ORAL
COMMUNITY
Start: 2023-02-15

## 2023-03-07 RX ORDER — ERGOCALCIFEROL 1.25 MG/1
CAPSULE ORAL
COMMUNITY
Start: 2023-01-19

## 2023-03-07 RX ORDER — MELOXICAM 15 MG/1
TABLET ORAL
COMMUNITY
Start: 2022-11-28

## 2023-03-07 RX ORDER — GABAPENTIN 300 MG/1
CAPSULE ORAL
COMMUNITY
Start: 2022-12-27

## 2023-04-19 ENCOUNTER — OFFICE VISIT (OUTPATIENT)
Dept: ENDOCRINOLOGY | Age: 78
End: 2023-04-19
Payer: MEDICARE

## 2023-04-19 VITALS
SYSTOLIC BLOOD PRESSURE: 140 MMHG | OXYGEN SATURATION: 96 % | BODY MASS INDEX: 19.69 KG/M2 | HEART RATE: 72 BPM | WEIGHT: 122 LBS | DIASTOLIC BLOOD PRESSURE: 90 MMHG

## 2023-04-19 DIAGNOSIS — E03.9 PRIMARY HYPOTHYROIDISM: ICD-10-CM

## 2023-04-19 DIAGNOSIS — M81.0 AGE-RELATED OSTEOPOROSIS WITHOUT CURRENT PATHOLOGICAL FRACTURE: ICD-10-CM

## 2023-04-19 DIAGNOSIS — E03.9 PRIMARY HYPOTHYROIDISM: Primary | ICD-10-CM

## 2023-04-19 DIAGNOSIS — E04.1 THYROID NODULE: ICD-10-CM

## 2023-04-19 LAB
T4 FREE SERPL-MCNC: 1.2 NG/DL (ref 0.78–1.46)
TSH, 3RD GENERATION: 0.09 UIU/ML (ref 0.36–3.74)

## 2023-04-19 PROCEDURE — 1090F PRES/ABSN URINE INCON ASSESS: CPT | Performed by: INTERNAL MEDICINE

## 2023-04-19 PROCEDURE — G8399 PT W/DXA RESULTS DOCUMENT: HCPCS | Performed by: INTERNAL MEDICINE

## 2023-04-19 PROCEDURE — G8420 CALC BMI NORM PARAMETERS: HCPCS | Performed by: INTERNAL MEDICINE

## 2023-04-19 PROCEDURE — 1123F ACP DISCUSS/DSCN MKR DOCD: CPT | Performed by: INTERNAL MEDICINE

## 2023-04-19 PROCEDURE — 99214 OFFICE O/P EST MOD 30 MIN: CPT | Performed by: INTERNAL MEDICINE

## 2023-04-19 PROCEDURE — 3080F DIAST BP >= 90 MM HG: CPT | Performed by: INTERNAL MEDICINE

## 2023-04-19 PROCEDURE — 1036F TOBACCO NON-USER: CPT | Performed by: INTERNAL MEDICINE

## 2023-04-19 PROCEDURE — 3077F SYST BP >= 140 MM HG: CPT | Performed by: INTERNAL MEDICINE

## 2023-04-19 PROCEDURE — G8427 DOCREV CUR MEDS BY ELIG CLIN: HCPCS | Performed by: INTERNAL MEDICINE

## 2023-04-19 RX ORDER — LEVOTHYROXINE SODIUM 0.05 MG/1
50 TABLET ORAL
Qty: 90 TABLET | Refills: 3 | Status: SHIPPED | OUTPATIENT
Start: 2023-04-19

## 2023-04-19 RX ORDER — LIOTHYRONINE SODIUM 5 UG/1
5 TABLET ORAL DAILY
Qty: 90 TABLET | Refills: 3 | Status: SHIPPED | OUTPATIENT
Start: 2023-04-19 | End: 2023-04-20 | Stop reason: ALTCHOICE

## 2023-04-19 ASSESSMENT — ENCOUNTER SYMPTOMS
DIARRHEA: 1
CONSTIPATION: 0

## 2023-04-19 NOTE — PROGRESS NOTES
Jad Palm MD, [facility]  [address], [address] [facility], [address] [place] [address]  Phone [phone]          Taran Contreras is a 66 y.o. female seen 4/19/2023 for follow up of hypothyroidism           ASSESSMENT AND PLAN:    1. Primary hypothyroidism  She reports a clinical improvement with combination therapy with levothyroxine and liothyronine. I will check her thyroid function test today and let her know if her doses need to be adjusted. Assuming she is euthyroid, she will follow-up in 1 year. - levothyroxine (SYNTHROID) 50 MCG tablet; Take 1 tablet by mouth every morning (before breakfast)  Dispense: 90 tablet; Refill: 3  - liothyronine (CYTOMEL) 5 MCG tablet; Take 1 tablet by mouth daily  Dispense: 90 tablet; Refill: 3    2. Thyroid nodule  Thyroid ultrasound performed 5/2021 revealed the presence of a subcentimeter complex nodule in the right lobe without suspicious sonographic features. No further imaging is required. 3. Age-related osteoporosis without current pathological fracture  She states that her primary care physician wanted my opinion regarding her osteoporosis. Her most recent bone density in 2019 revealed osteoporosis. Her daughter states that she was unable to tolerate alendronate, but she cannot recall the exact side effect. Her daughter seems to think that she had a GI intolerance. She was recently noted to have a thoracic vertebral compression fracture. The patient does not recall any preceding trauma and she denies any back pain in that area. She is certainly at high risk for future fractures. I would recommend that her primary care physician repeat a bone density. Since she may not be able to tolerate oral bisphosphonates, I think it would be reasonable to treat her with Prolia every 6 months or Reclast once a year. The patient states that she is not interested in Forteo therapy.   Her primary

## 2023-04-20 ENCOUNTER — TELEPHONE (OUTPATIENT)
Dept: ENDOCRINOLOGY | Age: 78
End: 2023-04-20

## 2023-04-20 DIAGNOSIS — E03.9 PRIMARY HYPOTHYROIDISM: Primary | ICD-10-CM

## 2023-04-20 NOTE — TELEPHONE ENCOUNTER
She is on too much thyroid hormone. I would recommend that she discontinue her liothyronine and continue the current dose of levothyroxine 50 mcg daily. I would like for her to get her labs repeated in 2 months following this change.

## 2023-04-20 NOTE — TELEPHONE ENCOUNTER
Tried to speak with the patient and the patients daughter for the message below and they both were too busy at the time talking to someone else and was not available to talk at the time. Will call back later.

## 2023-04-21 LAB — T3FREE SERPL-MCNC: 3.6 PG/ML (ref 2–4.4)

## 2023-05-19 DIAGNOSIS — E03.9 PRIMARY HYPOTHYROIDISM: ICD-10-CM

## 2023-05-19 RX ORDER — LEVOTHYROXINE SODIUM 0.05 MG/1
TABLET ORAL
Qty: 90 TABLET | Refills: 3 | OUTPATIENT
Start: 2023-05-19

## 2023-06-20 DIAGNOSIS — E03.9 PRIMARY HYPOTHYROIDISM: ICD-10-CM

## 2023-06-21 LAB
T3 SERPL-MCNC: 1.11 NG/ML (ref 0.6–1.81)
T4 FREE SERPL-MCNC: 1.1 NG/DL (ref 0.78–1.46)
TSH, 3RD GENERATION: 0.75 UIU/ML (ref 0.36–3.74)

## 2023-08-18 ENCOUNTER — HOSPITAL ENCOUNTER (OUTPATIENT)
Dept: MAMMOGRAPHY | Age: 78
End: 2023-08-18
Payer: MEDICARE

## 2023-08-18 VITALS — WEIGHT: 122 LBS | HEIGHT: 65 IN | BODY MASS INDEX: 20.33 KG/M2

## 2023-08-18 DIAGNOSIS — Z12.31 ENCOUNTER FOR SCREENING MAMMOGRAM FOR BREAST CANCER: ICD-10-CM

## 2023-08-18 DIAGNOSIS — R92.2 DENSE BREAST TISSUE ON MAMMOGRAM: ICD-10-CM

## 2023-08-18 PROCEDURE — 77063 BREAST TOMOSYNTHESIS BI: CPT

## 2023-09-15 ENCOUNTER — TELEPHONE (OUTPATIENT)
Dept: OBGYN CLINIC | Age: 78
End: 2023-09-15

## 2023-09-15 DIAGNOSIS — R92.8 ABNORMAL MAMMOGRAM: ICD-10-CM

## 2023-09-15 DIAGNOSIS — R92.2 DENSE BREAST TISSUE ON MAMMOGRAM: Primary | ICD-10-CM

## 2023-09-15 NOTE — TELEPHONE ENCOUNTER
I received a phone call from Mrs. Rodriguez 's daughter juan nguyen she is listed on her yaw. Tried  to call there back but got her vm. So I called Radha Yan spoke with her but was hard to communicate pt stated she was sick with shingles. I called Juan back but got her VM again that stated her full name and left a message with the number to compa lay so they can call and schedule her additional images.

## 2023-09-15 NOTE — TELEPHONE ENCOUNTER
Nelson rec'd from [facility] at [address]; radiologist requesting order for right breast ultrasound

## 2023-10-17 ENCOUNTER — OFFICE VISIT (OUTPATIENT)
Dept: NEUROLOGY | Age: 78
End: 2023-10-17
Payer: MEDICARE

## 2023-10-17 VITALS
DIASTOLIC BLOOD PRESSURE: 80 MMHG | SYSTOLIC BLOOD PRESSURE: 157 MMHG | BODY MASS INDEX: 21.79 KG/M2 | HEIGHT: 65 IN | HEART RATE: 61 BPM | WEIGHT: 130.8 LBS | OXYGEN SATURATION: 93 %

## 2023-10-17 DIAGNOSIS — F02.B0 MODERATE LATE ONSET ALZHEIMER'S DEMENTIA WITHOUT BEHAVIORAL DISTURBANCE, PSYCHOTIC DISTURBANCE, MOOD DISTURBANCE, OR ANXIETY (HCC): Primary | ICD-10-CM

## 2023-10-17 DIAGNOSIS — G30.1 MODERATE LATE ONSET ALZHEIMER'S DEMENTIA WITHOUT BEHAVIORAL DISTURBANCE, PSYCHOTIC DISTURBANCE, MOOD DISTURBANCE, OR ANXIETY (HCC): Primary | ICD-10-CM

## 2023-10-17 DIAGNOSIS — F01.B0 MODERATE VASCULAR DEMENTIA WITHOUT BEHAVIORAL DISTURBANCE, PSYCHOTIC DISTURBANCE, MOOD DISTURBANCE, OR ANXIETY (HCC): ICD-10-CM

## 2023-10-17 PROCEDURE — G8427 DOCREV CUR MEDS BY ELIG CLIN: HCPCS | Performed by: PSYCHIATRY & NEUROLOGY

## 2023-10-17 PROCEDURE — 1123F ACP DISCUSS/DSCN MKR DOCD: CPT | Performed by: PSYCHIATRY & NEUROLOGY

## 2023-10-17 PROCEDURE — G8399 PT W/DXA RESULTS DOCUMENT: HCPCS | Performed by: PSYCHIATRY & NEUROLOGY

## 2023-10-17 PROCEDURE — 3079F DIAST BP 80-89 MM HG: CPT | Performed by: PSYCHIATRY & NEUROLOGY

## 2023-10-17 PROCEDURE — 1036F TOBACCO NON-USER: CPT | Performed by: PSYCHIATRY & NEUROLOGY

## 2023-10-17 PROCEDURE — 99215 OFFICE O/P EST HI 40 MIN: CPT | Performed by: PSYCHIATRY & NEUROLOGY

## 2023-10-17 PROCEDURE — G8484 FLU IMMUNIZE NO ADMIN: HCPCS | Performed by: PSYCHIATRY & NEUROLOGY

## 2023-10-17 PROCEDURE — G8420 CALC BMI NORM PARAMETERS: HCPCS | Performed by: PSYCHIATRY & NEUROLOGY

## 2023-10-17 PROCEDURE — 1090F PRES/ABSN URINE INCON ASSESS: CPT | Performed by: PSYCHIATRY & NEUROLOGY

## 2023-10-17 PROCEDURE — 3077F SYST BP >= 140 MM HG: CPT | Performed by: PSYCHIATRY & NEUROLOGY

## 2023-10-17 RX ORDER — MEMANTINE HYDROCHLORIDE 10 MG/1
TABLET ORAL
Qty: 180 TABLET | Refills: 3 | Status: SHIPPED | OUTPATIENT
Start: 2023-10-17

## 2023-10-17 RX ORDER — GABAPENTIN 300 MG/1
300 CAPSULE ORAL 3 TIMES DAILY
COMMUNITY

## 2023-10-17 RX ORDER — RIVASTIGMINE TARTRATE 3 MG/1
3 CAPSULE ORAL 2 TIMES DAILY
Qty: 180 CAPSULE | Refills: 3 | Status: SHIPPED | OUTPATIENT
Start: 2023-10-17

## 2023-10-17 ASSESSMENT — ENCOUNTER SYMPTOMS
ALLERGIC/IMMUNOLOGIC NEGATIVE: 1
EYES NEGATIVE: 1
GASTROINTESTINAL NEGATIVE: 1
RESPIRATORY NEGATIVE: 1

## 2023-10-17 NOTE — PROGRESS NOTES
[facility], [address] [facility], [address]  Phone: [phone] Fax [phone]  Dr. Cipriano Dolan      10/17/2023  [address] [place] [address]     Patient is referred by the following provider for consultation regarding as below:       I reviewed the available records and notes and have examined patient with the following findings:     Chief Complaint:  Chief Complaint   Patient presents with    Follow-up     Mentions concern about memory, repeating questions, cognitive function, mentions her dementia seems to be considerably worse           HPI: This is a right handed 66 y.o. Single female who is very pleasant very appropriate patient unfortunately has had memory loss its been going on for about 6 years progressively getting worse in the last 4 to 5 months much worse. They did steroid out and she was seen by Dr. Renay Chung and Dr. Henry Ash and Dr. Shalini Ash who did neuropsychiatric testing that felt it was vascular dementia. Over the last few months to a year there is no question it takes on more of the Alzheimer's type picture her mom had been diagnosed with Alzheimer's type dementia. The MRI of the brain showed mild white matter changes but there was a year ago. She repeats questions within minutes she repeats conversations within the day as if she never told the story and almost all of her stories are long-term stories are old stories she is starting to mix up long-term stories amongst each other but they are all long-term. Her daughter took over the bills when she was moved in with and that was about 98 to 10 years ago. So she has been living with her daughter for 10 years and that is not because of memory. She does all of her activities of daily living she needs a little bit of assistance sometimes picking out close but overall she can dress herself shower bathe dress herself without any problems. Around the house she does laundry and she helps with dishes.   There is

## 2023-10-24 ENCOUNTER — CLINICAL DOCUMENTATION (OUTPATIENT)
Dept: NEUROLOGY | Age: 78
End: 2023-10-24

## 2023-10-24 NOTE — PROGRESS NOTES
The [facility] diagnostic blood studies show in a beta amyloid 42/40 is low making this a higher risk for Alzheimer's dementia. Sterling Roa came back at an average risk.

## 2023-10-30 ENCOUNTER — PATIENT MESSAGE (OUTPATIENT)
Dept: OBGYN CLINIC | Age: 78
End: 2023-10-30

## 2023-10-30 ENCOUNTER — TELEPHONE (OUTPATIENT)
Dept: OBGYN CLINIC | Age: 78
End: 2023-10-30

## 2023-10-30 NOTE — TELEPHONE ENCOUNTER
LVM for pt's child to call back or respond to [employer] message regarding mammogram for [place].

## 2023-11-10 ENCOUNTER — CLINICAL DOCUMENTATION (OUTPATIENT)
Dept: NEUROLOGY | Age: 78
End: 2023-11-10

## 2023-11-28 ENCOUNTER — HOSPITAL ENCOUNTER (OUTPATIENT)
Dept: MAMMOGRAPHY | Age: 78
Discharge: HOME | End: 2023-12-01
Attending: OBSTETRICS & GYNECOLOGY
Payer: MEDICARE

## 2023-11-28 DIAGNOSIS — R92.2 DENSE BREAST TISSUE ON MAMMOGRAM: ICD-10-CM

## 2023-11-28 DIAGNOSIS — R92.8 ABNORMAL SCREENING MAMMOGRAM: ICD-10-CM

## 2023-11-28 DIAGNOSIS — R92.8 ABNORMAL MAMMOGRAM: ICD-10-CM

## 2023-11-28 PROCEDURE — 77065 DX MAMMO INCL CAD UNI: CPT

## 2024-02-23 DIAGNOSIS — E03.9 PRIMARY HYPOTHYROIDISM: ICD-10-CM

## 2024-02-23 RX ORDER — LEVOTHYROXINE SODIUM 0.05 MG/1
50 TABLET ORAL
Qty: 90 TABLET | Refills: 3 | OUTPATIENT
Start: 2024-02-23

## 2024-03-09 NOTE — PROGRESS NOTES
has declined treatment.   Has dexa in 2d. Then a plan will be made for tx.  Mammogram- normal 8-2023, after add views on R. BD4      Allergies, medications, past medical and surgical history, social history, family history all reviewed.     Review of Systems      Constitutional:  Denies unexplained weight loss/gain or heat/ cold intolerance/loss of balance  ENT: Denies blurred vision, loss of hearing, hoarseness  Cardiovascular:  Denies chest pain, swelling in legs or feet, shortness of breath when lying flat  Respiratory:  Denies shortness of breath, cough greater than 2 weeks or coughing up blood  Gastro: Denies diarrhea greater than 2 weeks, rectal bleeding, bloody stools, heartburn, or constipation  :  Denies blood in urine, nocturia, dysuria or incontinence  Breast:  Denies nipple discharge, masses or pain  Skin:  Denies rash greater than 2 weeks, change in moles  Musculoskeletal/Neuro:  Denies joint pain, muscle weakness, seizures, headaches  Psych:  Denies new onset depression, anxiety, panic attacks  Heme:  Denies easy bruising, bleeding gums, frequent nosebleeds or swollen lymph nodes  GYN:  Denies bleeding or spotting between menses, heavy menses, menses longer than 7 days, pain with sex, severe menstrual cramps, bleeding after sex    Patient referred to a PCP for any significant nongyn findings on ROS.     BP (!) 140/74   Ht 1.651 m (5' 5\")   Wt 64.2 kg (141 lb 9.6 oz)   BMI 23.56 kg/m²     Body mass index is 23.56 kg/m².    Wt Readings from Last 3 Encounters:   03/11/24 64.2 kg (141 lb 9.6 oz)   10/17/23 59.3 kg (130 lb 12.8 oz)   08/18/23 55.3 kg (122 lb)         Pt in no distress. Alert and oriented x3. Affect bright.  Well developed, well nourished.  HEENT: normocephalic, atraumatic. Sclerae nonicteric.  Neck supple w/o thyromegaly. Trachea midline.  Lungs:  Respiratory effort normal.   Breasts: no distinct masses or axillary lymphadenopathy. No skin or nipple changes.  Abdomen: soft and

## 2024-03-11 ENCOUNTER — OFFICE VISIT (OUTPATIENT)
Dept: OBGYN CLINIC | Age: 79
End: 2024-03-11
Payer: MEDICARE

## 2024-03-11 VITALS
BODY MASS INDEX: 23.59 KG/M2 | WEIGHT: 141.6 LBS | DIASTOLIC BLOOD PRESSURE: 74 MMHG | SYSTOLIC BLOOD PRESSURE: 140 MMHG | HEIGHT: 65 IN

## 2024-03-11 DIAGNOSIS — N81.89 OTHER FEMALE GENITAL PROLAPSE: ICD-10-CM

## 2024-03-11 DIAGNOSIS — Z01.419 WELL WOMAN EXAM: Primary | ICD-10-CM

## 2024-03-11 DIAGNOSIS — Z12.31 ENCOUNTER FOR SCREENING MAMMOGRAM FOR MALIGNANT NEOPLASM OF BREAST: ICD-10-CM

## 2024-03-11 DIAGNOSIS — Z46.89 PESSARY MAINTENANCE: ICD-10-CM

## 2024-03-11 PROCEDURE — G8427 DOCREV CUR MEDS BY ELIG CLIN: HCPCS | Performed by: OBSTETRICS & GYNECOLOGY

## 2024-03-11 PROCEDURE — G8420 CALC BMI NORM PARAMETERS: HCPCS | Performed by: OBSTETRICS & GYNECOLOGY

## 2024-03-11 PROCEDURE — G0101 CA SCREEN;PELVIC/BREAST EXAM: HCPCS | Performed by: OBSTETRICS & GYNECOLOGY

## 2024-03-11 SDOH — ECONOMIC STABILITY: HOUSING INSECURITY
IN THE LAST 12 MONTHS, WAS THERE A TIME WHEN YOU DID NOT HAVE A STEADY PLACE TO SLEEP OR SLEPT IN A SHELTER (INCLUDING NOW)?: NO

## 2024-03-11 SDOH — ECONOMIC STABILITY: FOOD INSECURITY: WITHIN THE PAST 12 MONTHS, THE FOOD YOU BOUGHT JUST DIDN'T LAST AND YOU DIDN'T HAVE MONEY TO GET MORE.: NEVER TRUE

## 2024-03-11 SDOH — ECONOMIC STABILITY: INCOME INSECURITY: HOW HARD IS IT FOR YOU TO PAY FOR THE VERY BASICS LIKE FOOD, HOUSING, MEDICAL CARE, AND HEATING?: NOT HARD AT ALL

## 2024-03-11 SDOH — ECONOMIC STABILITY: FOOD INSECURITY: WITHIN THE PAST 12 MONTHS, YOU WORRIED THAT YOUR FOOD WOULD RUN OUT BEFORE YOU GOT MONEY TO BUY MORE.: NEVER TRUE

## 2024-03-26 NOTE — PROGRESS NOTES
3/28/2024      Madison Mitchell Alsop  : 1945  79 y.o.      HPI: here to jaime health of vaginal walls and put pessary back in. See my note from 3-11.     Exam:  BP (!) 140/88   Ht 1.651 m (5' 5\")   Wt 65.2 kg (143 lb 12.8 oz)   BMI 23.93 kg/m²     Body mass index is 23.93 kg/m².    Pt in no distress. Alert and oriented x3. Affect bright.  Well developed, well nourished.  HEENT: normocephalic, atraumatic. Sclerae nonicteric.  Pelvic: NL external genitalia. Vagina atrophic. Cx no longer inflamed. Vag tissues healthy. Pessary replaced  Perineum and anus normal. No hemorrhoids.   Neuro: grossly intact    Madison was seen today for follow-up.    Diagnoses and all orders for this visit:    Pessary maintenance    Rtn 3mos for pessary check  Pt wants to resume taking it out at night. If she is able to do so, she does not have to rtn at 3mos. But her daughter feels it is best to do so, b/c pt forgets things now  Pt will continue the use of this durable medical device. We reviewed how to remove, clean, replace     Chaperone present for pelvic part of exam.    Jayshree Adorno MD

## 2024-03-28 ENCOUNTER — OFFICE VISIT (OUTPATIENT)
Dept: OBGYN CLINIC | Age: 79
End: 2024-03-28
Payer: MEDICARE

## 2024-03-28 VITALS
BODY MASS INDEX: 23.96 KG/M2 | SYSTOLIC BLOOD PRESSURE: 140 MMHG | DIASTOLIC BLOOD PRESSURE: 88 MMHG | WEIGHT: 143.8 LBS | HEIGHT: 65 IN

## 2024-03-28 DIAGNOSIS — Z46.89 PESSARY MAINTENANCE: Primary | ICD-10-CM

## 2024-03-28 PROCEDURE — G8484 FLU IMMUNIZE NO ADMIN: HCPCS | Performed by: OBSTETRICS & GYNECOLOGY

## 2024-03-28 PROCEDURE — G8427 DOCREV CUR MEDS BY ELIG CLIN: HCPCS | Performed by: OBSTETRICS & GYNECOLOGY

## 2024-03-28 PROCEDURE — G8399 PT W/DXA RESULTS DOCUMENT: HCPCS | Performed by: OBSTETRICS & GYNECOLOGY

## 2024-03-28 PROCEDURE — 1036F TOBACCO NON-USER: CPT | Performed by: OBSTETRICS & GYNECOLOGY

## 2024-03-28 PROCEDURE — 1123F ACP DISCUSS/DSCN MKR DOCD: CPT | Performed by: OBSTETRICS & GYNECOLOGY

## 2024-03-28 PROCEDURE — 3077F SYST BP >= 140 MM HG: CPT | Performed by: OBSTETRICS & GYNECOLOGY

## 2024-03-28 PROCEDURE — 99213 OFFICE O/P EST LOW 20 MIN: CPT | Performed by: OBSTETRICS & GYNECOLOGY

## 2024-03-28 PROCEDURE — 99459 PELVIC EXAMINATION: CPT | Performed by: OBSTETRICS & GYNECOLOGY

## 2024-03-28 PROCEDURE — G8420 CALC BMI NORM PARAMETERS: HCPCS | Performed by: OBSTETRICS & GYNECOLOGY

## 2024-03-28 PROCEDURE — 1090F PRES/ABSN URINE INCON ASSESS: CPT | Performed by: OBSTETRICS & GYNECOLOGY

## 2024-03-28 PROCEDURE — 3079F DIAST BP 80-89 MM HG: CPT | Performed by: OBSTETRICS & GYNECOLOGY

## 2024-04-23 ENCOUNTER — OFFICE VISIT (OUTPATIENT)
Dept: NEUROLOGY | Age: 79
End: 2024-04-23
Payer: MEDICARE

## 2024-04-23 DIAGNOSIS — G30.8 MODERATE ALZHEIMER'S DEMENTIA OF OTHER ONSET WITHOUT BEHAVIORAL DISTURBANCE, PSYCHOTIC DISTURBANCE, MOOD DISTURBANCE, OR ANXIETY (HCC): ICD-10-CM

## 2024-04-23 DIAGNOSIS — F02.B0 MODERATE ALZHEIMER'S DEMENTIA OF OTHER ONSET WITHOUT BEHAVIORAL DISTURBANCE, PSYCHOTIC DISTURBANCE, MOOD DISTURBANCE, OR ANXIETY (HCC): ICD-10-CM

## 2024-04-23 DIAGNOSIS — R42 VERTIGO: ICD-10-CM

## 2024-04-23 DIAGNOSIS — G45.9 TIA (TRANSIENT ISCHEMIC ATTACK): Primary | ICD-10-CM

## 2024-04-23 PROCEDURE — G8427 DOCREV CUR MEDS BY ELIG CLIN: HCPCS | Performed by: PSYCHIATRY & NEUROLOGY

## 2024-04-23 PROCEDURE — 1123F ACP DISCUSS/DSCN MKR DOCD: CPT | Performed by: PSYCHIATRY & NEUROLOGY

## 2024-04-23 PROCEDURE — 1036F TOBACCO NON-USER: CPT | Performed by: PSYCHIATRY & NEUROLOGY

## 2024-04-23 PROCEDURE — 1090F PRES/ABSN URINE INCON ASSESS: CPT | Performed by: PSYCHIATRY & NEUROLOGY

## 2024-04-23 PROCEDURE — G8420 CALC BMI NORM PARAMETERS: HCPCS | Performed by: PSYCHIATRY & NEUROLOGY

## 2024-04-23 PROCEDURE — G8399 PT W/DXA RESULTS DOCUMENT: HCPCS | Performed by: PSYCHIATRY & NEUROLOGY

## 2024-04-23 PROCEDURE — 99214 OFFICE O/P EST MOD 30 MIN: CPT | Performed by: PSYCHIATRY & NEUROLOGY

## 2024-04-23 RX ORDER — RIVASTIGMINE TARTRATE 6 MG/1
6 CAPSULE ORAL 2 TIMES DAILY
Qty: 180 CAPSULE | Refills: 3 | Status: SHIPPED | OUTPATIENT
Start: 2024-04-23

## 2024-04-23 RX ORDER — MECLIZINE HCL 12.5 MG/1
12.5 TABLET ORAL 3 TIMES DAILY PRN
Qty: 20 TABLET | Refills: 3 | Status: SHIPPED | OUTPATIENT
Start: 2024-04-23 | End: 2024-05-23

## 2024-04-23 ASSESSMENT — ENCOUNTER SYMPTOMS
RESPIRATORY NEGATIVE: 1
GASTROINTESTINAL NEGATIVE: 1

## 2024-04-23 NOTE — PROGRESS NOTES
[facility] NEUROLOGY  [address] [place] [address], [address]  [place] [address]  Phone: [phone] Fax [phone]  Dr. Ryley Barbosa      4/23/2024  Madison Jordan     Patient is referred by the following provider for consultation regarding as below:       I reviewed the available records and notes and have examined patient with the following findings:     Chief Complaint:  No chief complaint on file.         HPI: This is a right handed 79 y.o.  female who is very pleasant very appropriate patient unfortunately does have short-term memory loss has been going on about 6-1/2 years and progressively getting worse.  She actually did have neuropsychiatric testing done by Dr. Daniela Love that came up with vascular dementia but it is the progression of short-term memory loss.  She lives with her daughter and has for 10+ years since her divorce.  Over the last 1 year she is progressively gotten worse she repeats questions within minutes conversations within a day when she tells stories that are always old stories that are very strong.  She her daughter does her bills and has for 10 years not because of memory.  But would be [place] because of memory.  She does her own laundry dishes and helps around the house.  She uses postop notes and calendars but often forgets to use it utilizes reminders.  She is on Exelon 3 mg twice a day Namenda 10 twice a day.  Last evaluation we did an MRI of her brain that we ordered but it was she was never called.  She has tried Aricept with bad side effects.  Has an Exelon patch had bad side effects.  But Exelon tablets and Namenda she is doing well with.  She has had an FDG PET scan that was negative she has a mother with Alzheimer's disease and her last MRI was in 2022 but now she is getting these recurrent episodes of lightheaded or dizziness in her head vertigo in her head.  \"Gunky\" feeling.  Comes and goes almost daily.  She also has this on visual changes that she is on

## 2024-05-24 ENCOUNTER — CLINICAL DOCUMENTATION (OUTPATIENT)
Dept: NEUROLOGY | Age: 79
End: 2024-05-24

## 2024-05-24 RX ORDER — ESCITALOPRAM OXALATE 20 MG/1
20 TABLET ORAL DAILY
Qty: 90 TABLET | Refills: 3 | Status: SHIPPED | OUTPATIENT
Start: 2024-05-24

## 2024-06-04 NOTE — PROGRESS NOTES
2024      Madison Mitchell Also  : 1945  79 y.o.      HPI: c/o \"discomfort\"  Saw her late March for pessary care.   Here with her daughter. They report that she had discomfort with the pessary after I placed it in march. So she took it out and has not put it back in. Is more sedentary than she used to be. Has been comfortable w/o it. Wonders if she needs a smaller one since this one was uncomfortable.     Exam:  /72   Ht 1.651 m (5' 5\")   Wt 61.7 kg (136 lb)   BMI 22.63 kg/m²     Body mass index is 22.63 kg/m².    Wt Readings from Last 3 Encounters:   24 61.7 kg (136 lb)   24 65.2 kg (143 lb 12.8 oz)   24 64.2 kg (141 lb 9.6 oz)         Pt in no distress. Alert and oriented x3. Affect bright.  Well developed, well nourished.  HEENT: normocephalic, atraumatic. Sclerae nonicteric.  Pelvic: NL ext genitalia. Vag opening atrophic. No bulge. Bimanual exam NT, small uterus, no adnexal masses.   Perineum and anus normal. No hemorrhoids.   Neuro: grossly intact    Madison was seen today for problem visit.    Diagnoses and all orders for this visit:    Other female genital prolapse    Pessary maintenance     D/w them that if she is more comfortable w/o the pessary, leave it out. Watch for recurrent UTIs.   They hope she will become more mobile with time and tx. If that is the case and she needs the pessary for support, we can always resume its use.   She was worried that her small amt of wt loss might cause her to need a smaller pessary. I do not think that is the case.     Jayshree Adorno MD

## 2024-06-05 ENCOUNTER — OFFICE VISIT (OUTPATIENT)
Dept: OBGYN CLINIC | Age: 79
End: 2024-06-05
Payer: MEDICARE

## 2024-06-05 VITALS
SYSTOLIC BLOOD PRESSURE: 138 MMHG | WEIGHT: 136 LBS | HEIGHT: 65 IN | DIASTOLIC BLOOD PRESSURE: 72 MMHG | BODY MASS INDEX: 22.66 KG/M2

## 2024-06-05 DIAGNOSIS — N81.89 OTHER FEMALE GENITAL PROLAPSE: Primary | ICD-10-CM

## 2024-06-05 DIAGNOSIS — Z46.89 PESSARY MAINTENANCE: ICD-10-CM

## 2024-06-05 PROBLEM — G30.9 ALZHEIMER'S DISEASE, UNSPECIFIED (CODE) (HCC): Status: ACTIVE | Noted: 2024-06-05

## 2024-06-05 PROCEDURE — 1123F ACP DISCUSS/DSCN MKR DOCD: CPT | Performed by: OBSTETRICS & GYNECOLOGY

## 2024-06-05 PROCEDURE — 3078F DIAST BP <80 MM HG: CPT | Performed by: OBSTETRICS & GYNECOLOGY

## 2024-06-05 PROCEDURE — 1036F TOBACCO NON-USER: CPT | Performed by: OBSTETRICS & GYNECOLOGY

## 2024-06-05 PROCEDURE — 3075F SYST BP GE 130 - 139MM HG: CPT | Performed by: OBSTETRICS & GYNECOLOGY

## 2024-06-05 PROCEDURE — 99213 OFFICE O/P EST LOW 20 MIN: CPT | Performed by: OBSTETRICS & GYNECOLOGY

## 2024-06-05 PROCEDURE — G8427 DOCREV CUR MEDS BY ELIG CLIN: HCPCS | Performed by: OBSTETRICS & GYNECOLOGY

## 2024-06-05 PROCEDURE — G8420 CALC BMI NORM PARAMETERS: HCPCS | Performed by: OBSTETRICS & GYNECOLOGY

## 2024-06-05 PROCEDURE — 1090F PRES/ABSN URINE INCON ASSESS: CPT | Performed by: OBSTETRICS & GYNECOLOGY

## 2024-06-05 PROCEDURE — G8399 PT W/DXA RESULTS DOCUMENT: HCPCS | Performed by: OBSTETRICS & GYNECOLOGY

## 2024-06-05 RX ORDER — LOSARTAN POTASSIUM 25 MG/1
12.5 TABLET ORAL DAILY
COMMUNITY
Start: 2024-05-15

## 2024-06-05 RX ORDER — LORAZEPAM 0.5 MG/1
0.5 TABLET ORAL 3 TIMES DAILY PRN
COMMUNITY
Start: 2024-06-05

## 2024-06-05 RX ORDER — MECLIZINE HYDROCHLORIDE 25 MG/1
25 TABLET ORAL 3 TIMES DAILY PRN
COMMUNITY
Start: 2024-06-05

## 2024-09-24 ENCOUNTER — OFFICE VISIT (OUTPATIENT)
Dept: NEUROLOGY | Age: 79
End: 2024-09-24
Payer: MEDICARE

## 2024-09-24 DIAGNOSIS — F03.B0 MODERATE DEMENTIA WITHOUT BEHAVIORAL DISTURBANCE, PSYCHOTIC DISTURBANCE, MOOD DISTURBANCE, OR ANXIETY, UNSPECIFIED DEMENTIA TYPE (HCC): ICD-10-CM

## 2024-09-24 DIAGNOSIS — F03.B0 MODERATE DEMENTIA WITHOUT BEHAVIORAL DISTURBANCE, PSYCHOTIC DISTURBANCE, MOOD DISTURBANCE, OR ANXIETY, UNSPECIFIED DEMENTIA TYPE (HCC): Primary | ICD-10-CM

## 2024-09-24 DIAGNOSIS — I67.1 BRAIN ANEURYSM: ICD-10-CM

## 2024-09-24 PROCEDURE — 99214 OFFICE O/P EST MOD 30 MIN: CPT | Performed by: PSYCHIATRY & NEUROLOGY

## 2024-09-24 PROCEDURE — 1123F ACP DISCUSS/DSCN MKR DOCD: CPT | Performed by: PSYCHIATRY & NEUROLOGY

## 2024-09-24 PROCEDURE — 1090F PRES/ABSN URINE INCON ASSESS: CPT | Performed by: PSYCHIATRY & NEUROLOGY

## 2024-09-24 PROCEDURE — G8399 PT W/DXA RESULTS DOCUMENT: HCPCS | Performed by: PSYCHIATRY & NEUROLOGY

## 2024-09-24 PROCEDURE — G8420 CALC BMI NORM PARAMETERS: HCPCS | Performed by: PSYCHIATRY & NEUROLOGY

## 2024-09-24 PROCEDURE — G8427 DOCREV CUR MEDS BY ELIG CLIN: HCPCS | Performed by: PSYCHIATRY & NEUROLOGY

## 2024-09-24 PROCEDURE — 1036F TOBACCO NON-USER: CPT | Performed by: PSYCHIATRY & NEUROLOGY

## 2024-09-24 RX ORDER — MEMANTINE HYDROCHLORIDE 10 MG/1
TABLET ORAL
Qty: 180 TABLET | Refills: 3 | Status: SHIPPED | OUTPATIENT
Start: 2024-09-24

## 2024-09-24 RX ORDER — RIVASTIGMINE TARTRATE 6 MG/1
6 CAPSULE ORAL 2 TIMES DAILY
Qty: 180 CAPSULE | Refills: 3 | Status: SHIPPED | OUTPATIENT
Start: 2024-09-24

## 2024-09-24 ASSESSMENT — ENCOUNTER SYMPTOMS
RESPIRATORY NEGATIVE: 1
EYES NEGATIVE: 1
GASTROINTESTINAL NEGATIVE: 1
ALLERGIC/IMMUNOLOGIC NEGATIVE: 1

## 2024-09-25 LAB
Lab: POSITIVE
REFERENCE LAB: NORMAL

## 2024-09-30 ENCOUNTER — CLINICAL DOCUMENTATION (OUTPATIENT)
Dept: NEUROLOGY | Age: 79
End: 2024-09-30

## 2024-09-30 LAB — IMMUNOLOGIST REVIEW: NORMAL

## 2024-10-08 ENCOUNTER — CLINICAL DOCUMENTATION (OUTPATIENT)
Dept: NEUROLOGY | Age: 79
End: 2024-10-08

## 2024-10-08 LAB
Lab: NORMAL
Lab: POSITIVE
REFERENCE LAB: NORMAL

## 2024-10-19 ENCOUNTER — HOSPITAL ENCOUNTER (OUTPATIENT)
Dept: MAMMOGRAPHY | Age: 79
Discharge: HOME | End: 2024-10-22
Attending: OBSTETRICS & GYNECOLOGY
Payer: MEDICARE

## 2024-10-19 DIAGNOSIS — Z12.31 ENCOUNTER FOR SCREENING MAMMOGRAM FOR MALIGNANT NEOPLASM OF BREAST: ICD-10-CM

## 2024-10-19 PROCEDURE — 77063 BREAST TOMOSYNTHESIS BI: CPT

## 2024-12-05 ENCOUNTER — HOSPITAL ENCOUNTER (OUTPATIENT)
Dept: CT IMAGING | Age: 79
Discharge: HOME | End: 2024-12-08
Attending: PSYCHIATRY & NEUROLOGY
Payer: MEDICARE

## 2024-12-05 DIAGNOSIS — I67.1 BRAIN ANEURYSM: ICD-10-CM

## 2024-12-05 LAB — CREAT BLD-MCNC: 1.05 MG/DL (ref 0.8–1.5)

## 2024-12-05 PROCEDURE — 82565 ASSAY OF CREATININE: CPT

## 2024-12-05 PROCEDURE — 70496 CT ANGIOGRAPHY HEAD: CPT | Performed by: RADIOLOGY

## 2024-12-05 PROCEDURE — 6360000004 HC RX CONTRAST MEDICATION: Performed by: PSYCHIATRY & NEUROLOGY

## 2024-12-05 PROCEDURE — 70496 CT ANGIOGRAPHY HEAD: CPT

## 2024-12-05 RX ORDER — IOPAMIDOL 755 MG/ML
50 INJECTION, SOLUTION INTRAVASCULAR
Status: COMPLETED | OUTPATIENT
Start: 2024-12-05 | End: 2024-12-05

## 2024-12-05 RX ADMIN — IOPAMIDOL 50 ML: 755 INJECTION, SOLUTION INTRAVENOUS at 08:31

## 2025-06-02 NOTE — PROGRESS NOTES
6/3/2025    Madison Mitchell Alsop  1945      PCP: Tonio Werner MD  Patient does see them for regular preventative visits.      HPI: 80 y.o. year old  Here for annual gyn wellness exam.   Pt has dementia. Here with her daughter. Lives with another daughter. Her short term memory waxes and wanes.  She denies any gyn problems.  Doing ok still w/o the pessary. LV she told me she was fine w/o it, so the plan was to leave it out. This was a yr ago.   Her daughter thinks she mentioned having some bldg a few mos ago. Pt does not remember this.       No LMP recorded. Patient is postmenopausal.    Social History     Socioeconomic History    Marital status:      Spouse name: None    Number of children: None    Years of education: None    Highest education level: None   Tobacco Use    Smoking status: Never    Smokeless tobacco: Never   Vaping Use    Vaping status: Never Used   Substance and Sexual Activity    Alcohol use: No    Drug use: No    Sexual activity: Not Currently     Social Drivers of Health     Financial Resource Strain: Low Risk  (3/12/2025)    Received from [email or web address]    Financial Resource Strain     Difficulty Paying Living Expenses: Not very hard     Difficulty Paying Medical Expenses: No   Food Insecurity: No Food Insecurity (6/3/2025)    Hunger Vital Sign     Worried About Running Out of Food in the Last Year: Never true     Ran Out of Food in the Last Year: Never true   Transportation Needs: No Transportation Needs (6/3/2025)    PRAPARE - Transportation     Lack of Transportation (Medical): No     Lack of Transportation (Non-Medical): No   Physical Activity: Inactive (2024)    Received from [email or web address]    Physical Activity     Days of Exercise per Week: 0 days     On average, how many minutes do you exercise per day?: 0     Total Minutes of Exercise Per Week: 0   Stress: No Stress Concern Present (3/12/2025)    Received from [email or web address]    Stress     Feeling of Stress : Only a

## 2025-06-03 ENCOUNTER — OFFICE VISIT (OUTPATIENT)
Dept: OBGYN CLINIC | Age: 80
End: 2025-06-03
Payer: MEDICARE

## 2025-06-03 VITALS
BODY MASS INDEX: 19.66 KG/M2 | SYSTOLIC BLOOD PRESSURE: 134 MMHG | DIASTOLIC BLOOD PRESSURE: 82 MMHG | HEIGHT: 65 IN | WEIGHT: 118 LBS

## 2025-06-03 DIAGNOSIS — Z12.31 ENCOUNTER FOR SCREENING MAMMOGRAM FOR MALIGNANT NEOPLASM OF BREAST: ICD-10-CM

## 2025-06-03 DIAGNOSIS — Z46.89 PESSARY MAINTENANCE: ICD-10-CM

## 2025-06-03 DIAGNOSIS — N95.0 PMB (POSTMENOPAUSAL BLEEDING): ICD-10-CM

## 2025-06-03 DIAGNOSIS — Z01.419 WELL WOMAN EXAM: Primary | ICD-10-CM

## 2025-06-03 PROCEDURE — G8427 DOCREV CUR MEDS BY ELIG CLIN: HCPCS | Performed by: OBSTETRICS & GYNECOLOGY

## 2025-06-03 PROCEDURE — G8420 CALC BMI NORM PARAMETERS: HCPCS | Performed by: OBSTETRICS & GYNECOLOGY

## 2025-06-03 PROCEDURE — G0101 CA SCREEN;PELVIC/BREAST EXAM: HCPCS | Performed by: OBSTETRICS & GYNECOLOGY

## 2025-06-03 SDOH — ECONOMIC STABILITY: FOOD INSECURITY: WITHIN THE PAST 12 MONTHS, YOU WORRIED THAT YOUR FOOD WOULD RUN OUT BEFORE YOU GOT MONEY TO BUY MORE.: NEVER TRUE

## 2025-06-03 SDOH — ECONOMIC STABILITY: FOOD INSECURITY: WITHIN THE PAST 12 MONTHS, THE FOOD YOU BOUGHT JUST DIDN'T LAST AND YOU DIDN'T HAVE MONEY TO GET MORE.: NEVER TRUE
